# Patient Record
Sex: FEMALE | Race: WHITE | NOT HISPANIC OR LATINO | Employment: OTHER | URBAN - METROPOLITAN AREA
[De-identification: names, ages, dates, MRNs, and addresses within clinical notes are randomized per-mention and may not be internally consistent; named-entity substitution may affect disease eponyms.]

---

## 2017-04-18 ENCOUNTER — OFFICE VISIT (OUTPATIENT)
Dept: ORTHOPEDIC SURGERY | Facility: CLINIC | Age: 67
End: 2017-04-18

## 2017-04-18 VITALS — WEIGHT: 208 LBS | BODY MASS INDEX: 36.86 KG/M2 | HEIGHT: 63 IN | TEMPERATURE: 97.8 F

## 2017-04-18 DIAGNOSIS — M25.561 CHRONIC PAIN OF RIGHT KNEE: Primary | ICD-10-CM

## 2017-04-18 DIAGNOSIS — G89.29 CHRONIC PAIN OF RIGHT KNEE: Primary | ICD-10-CM

## 2017-04-18 DIAGNOSIS — M25.552 LEFT HIP PAIN: ICD-10-CM

## 2017-04-18 PROCEDURE — 73562 X-RAY EXAM OF KNEE 3: CPT | Performed by: NURSE PRACTITIONER

## 2017-04-18 PROCEDURE — 99203 OFFICE O/P NEW LOW 30 MIN: CPT | Performed by: NURSE PRACTITIONER

## 2017-04-18 PROCEDURE — 20610 DRAIN/INJ JOINT/BURSA W/O US: CPT | Performed by: NURSE PRACTITIONER

## 2017-04-18 PROCEDURE — 73502 X-RAY EXAM HIP UNI 2-3 VIEWS: CPT | Performed by: NURSE PRACTITIONER

## 2017-04-18 RX ORDER — LOSARTAN POTASSIUM 50 MG/1
50 TABLET ORAL DAILY
COMMUNITY
End: 2019-05-07 | Stop reason: SDUPTHER

## 2017-04-18 RX ORDER — LIDOCAINE HYDROCHLORIDE 10 MG/ML
4 INJECTION, SOLUTION INFILTRATION; PERINEURAL
Status: COMPLETED | OUTPATIENT
Start: 2017-04-18 | End: 2017-04-18

## 2017-04-18 RX ORDER — FLUTICASONE PROPIONATE 50 MCG
2 SPRAY, SUSPENSION (ML) NASAL AS NEEDED
COMMUNITY
End: 2017-09-07

## 2017-04-18 RX ORDER — BUPIVACAINE HYDROCHLORIDE 5 MG/ML
2 INJECTION, SOLUTION PERINEURAL
Status: COMPLETED | OUTPATIENT
Start: 2017-04-18 | End: 2017-04-18

## 2017-04-18 RX ORDER — METHYLPREDNISOLONE ACETATE 80 MG/ML
80 INJECTION, SUSPENSION INTRA-ARTICULAR; INTRALESIONAL; INTRAMUSCULAR; SOFT TISSUE
Status: COMPLETED | OUTPATIENT
Start: 2017-04-18 | End: 2017-04-18

## 2017-04-18 RX ORDER — HYDROCHLOROTHIAZIDE 25 MG/1
25 TABLET ORAL DAILY
Status: ON HOLD | COMMUNITY
End: 2018-10-06

## 2017-04-18 RX ORDER — LIDOCAINE HYDROCHLORIDE 10 MG/ML
2 INJECTION, SOLUTION INFILTRATION; PERINEURAL
Status: COMPLETED | OUTPATIENT
Start: 2017-04-18 | End: 2017-04-18

## 2017-04-18 RX ORDER — OMEPRAZOLE 20 MG/1
20 CAPSULE, DELAYED RELEASE ORAL EVERY MORNING
COMMUNITY
End: 2019-12-19 | Stop reason: DRUGHIGH

## 2017-04-18 RX ORDER — ACETAMINOPHEN 500 MG
500 TABLET ORAL AS NEEDED
COMMUNITY
End: 2018-10-06 | Stop reason: HOSPADM

## 2017-04-18 RX ADMIN — METHYLPREDNISOLONE ACETATE 80 MG: 80 INJECTION, SUSPENSION INTRA-ARTICULAR; INTRALESIONAL; INTRAMUSCULAR; SOFT TISSUE at 14:38

## 2017-04-18 RX ADMIN — LIDOCAINE HYDROCHLORIDE 2 ML: 10 INJECTION, SOLUTION INFILTRATION; PERINEURAL at 14:38

## 2017-04-18 RX ADMIN — BUPIVACAINE HYDROCHLORIDE 2 ML: 5 INJECTION, SOLUTION PERINEURAL at 14:38

## 2017-04-18 RX ADMIN — LIDOCAINE HYDROCHLORIDE 4 ML: 10 INJECTION, SOLUTION INFILTRATION; PERINEURAL at 14:38

## 2017-04-18 NOTE — PROGRESS NOTES
Patient: Chanell Tafoya  YOB: 1950 66 y.o. female  Medical Record Number: [id number]    Chief Complaints:   Chief Complaint   Patient presents with   • Left Hip - Pain   • Right Knee - Pain       History of Present Illness:Chanell Tafoya is a 66 y.o. female who presents With complaints of right knee and left hip pain.  Patient reports she has had ongoing issues with her right knee for years and had previous right knee arthroscopy, however he has definitely gotten worse over the last 6 months or so.  In addition she started with some left lateral hip pain a few months ago as well.  Denies any specific injury.  Saw her primary care physician and they referred her to outpatient physical therapy which has seemed to help.    Allergies:   Allergies   Allergen Reactions   • Amitriptyline Shortness Of Breath     Anaphylactic episode   • Asa [Aspirin] Other (See Comments)     Severe abdominal pain   • Clindamycin/Lincomycin Other (See Comments)     Blisters and erosions in esophagus & stomach   • Codeine Other (See Comments)     In large amounts causes headaches   • Dilaudid [Hydromorphone]      Causes chest pains/breathing problems when given by IV   • Gabapentin Diarrhea   • Ibuprofen Swelling     Swelling of hands and legs   • Naproxen      Blisters in mouth/stomach   • Other      Surgical tape causes blisters     • Talwin [Pentazocine] Itching       Medications:   Current Outpatient Prescriptions   Medication Sig Dispense Refill   • acetaminophen (TYLENOL) 500 MG tablet Take 500 mg by mouth As Needed for Mild Pain (1-3).     • fluticasone (FLONASE) 50 MCG/ACT nasal spray 2 sprays into each nostril As Needed for Rhinitis.     • hydrochlorothiazide (HYDRODIURIL) 25 MG tablet Take 25 mg by mouth Daily.     • losartan (COZAAR) 50 MG tablet Take 50 mg by mouth Daily.     • omeprazole (priLOSEC) 20 MG capsule Take 20 mg by mouth Every Morning.       No current facility-administered medications for this visit.   "        The following portions of the patient's history were reviewed and updated as appropriate: allergies, current medications, past family history, past medical history, past social history, past surgical history and problem list.    Review of Systems:   A 14 point review of systems was performed. All systems negative except pertinent positives/negative listed in HPI above    Physical Exam:   Vitals:    04/18/17 1404   Temp: 97.8 °F (36.6 °C)   Weight: 208 lb (94.3 kg)   Height: 63\" (160 cm)       General: A and O x 3, ASA, NAD    SCLERA:    Normal    DENTITION:   Normal  Skin clear no unusual lesions noted  Right knee trace amount of effusion 110° flexion +10° in extension with a positive Viki negative Lockman calf is soft nontender left hip patient is very tender over greater trochanteric bursa otherwise good range of motion with a negative central negative logroll neurologic intact    Radiology:  Xrays 3views (ap,lateral, sunrise) right knee were ordered and reviewed today secondary to pain and show bone-on-bone end-stage osteoarthritis with cyst and spur formation in addition to views left hip were ordered and reviewed today secondary to pain and were essentially normal.  No comparative views available    Assessment/Plan:  End-stage osteoarthritis right knee  Left hip greater trochanteric bursitis    We will proceed with right knee and left hip bursa injections.  Also I will write an additional order for outpatient physical therapy.  She is unable to take anti-inflammatories so she will continue with Tylenol as needed and will follow-up with Dr. Sloan in 6 weeks    Large Joint Arthrocentesis  Date/Time: 4/18/2017 2:38 PM  Consent given by: patient  Site marked: site marked  Timeout: Immediately prior to procedure a time out was called to verify the correct patient, procedure, equipment, support staff and site/side marked as required   Supporting Documentation  Indications: pain and joint swelling "   Procedure Details  Location: knee - R knee  Preparation: Patient was prepped and draped in the usual sterile fashion  Needle size: 18 G  Approach: anterolateral  Medications administered: 4 mL lidocaine 1 %; 80 mg methylPREDNISolone acetate 80 MG/ML; 2 mL bupivacaine      Large Joint Arthrocentesis  Date/Time: 4/18/2017 2:38 PM  Consent given by: patient  Site marked: site marked  Timeout: Immediately prior to procedure a time out was called to verify the correct patient, procedure, equipment, support staff and site/side marked as required   Supporting Documentation  Indications: pain   Procedure Details  Location: hip - L greater trochanteric bursa  Needle size: 18 G  Approach: lateral  Medications administered: 2 mL lidocaine 1 %; 80 mg methylPREDNISolone acetate 80 MG/ML; 2 mL bupivacaine

## 2017-06-01 ENCOUNTER — OFFICE VISIT (OUTPATIENT)
Dept: ORTHOPEDIC SURGERY | Facility: CLINIC | Age: 67
End: 2017-06-01

## 2017-06-01 VITALS — BODY MASS INDEX: 36.46 KG/M2 | WEIGHT: 205.8 LBS | TEMPERATURE: 98.3 F | HEIGHT: 63 IN

## 2017-06-01 DIAGNOSIS — M17.11 PRIMARY OSTEOARTHRITIS OF RIGHT KNEE: Primary | ICD-10-CM

## 2017-06-01 PROCEDURE — 99214 OFFICE O/P EST MOD 30 MIN: CPT | Performed by: ORTHOPAEDIC SURGERY

## 2017-06-01 RX ORDER — MELATONIN
1000 DAILY
COMMUNITY

## 2017-06-01 NOTE — PROGRESS NOTES
Patient: Chanell Tafoya  YOB: 1950 66 y.o. female  Medical Record Number: [id number]    Chief Complaints:   Chief Complaint   Patient presents with   • Right Knee - Follow-up, Pain   • Left Hip - Follow-up, Pain       History of Present Illness:Chanell Tafoya is a 66 y.o. female who presents for follow-up of  Of her right knee.  She has severe valgus end-stage osteoarthritis.  Injections have been relatively short-lived.  She's having considerable mechanical symptoms with a knee shifting and popping and giving out on her.  She has trouble arising from a seated position.  She has trouble walking even short distances especially such as when getting up to go to the restroom.  She can only walk very short distances the pain as a stabbing aching pain about the lateral joint.  She still does physical therapy and feels that her legs are stronger but the pain and instability are still persistent    Allergies:   Allergies   Allergen Reactions   • Amitriptyline Shortness Of Breath     Anaphylactic episode   • Asa [Aspirin] Other (See Comments)     Severe abdominal pain   • Clindamycin/Lincomycin Other (See Comments)     Blisters and erosions in esophagus & stomach   • Codeine Other (See Comments)     In large amounts causes headaches   • Dilaudid [Hydromorphone]      Causes chest pains/breathing problems when given by IV   • Gabapentin Diarrhea   • Ibuprofen Swelling     Swelling of hands and legs   • Naproxen      Blisters in mouth/stomach   • Other      Surgical tape causes blisters     • Talwin [Pentazocine] Itching       Medications:   Current Outpatient Prescriptions   Medication Sig Dispense Refill   • acetaminophen (TYLENOL) 500 MG tablet Take 500 mg by mouth As Needed for Mild Pain (1-3).     • cholecalciferol (VITAMIN D3) 1000 UNITS tablet Take 1,000 Units by mouth Daily.     • fluticasone (FLONASE) 50 MCG/ACT nasal spray 2 sprays into each nostril As Needed for Rhinitis.     • hydrochlorothiazide  "(HYDRODIURIL) 25 MG tablet Take 25 mg by mouth Daily.     • losartan (COZAAR) 50 MG tablet Take 50 mg by mouth Daily.     • omeprazole (priLOSEC) 20 MG capsule Take 20 mg by mouth Every Morning.       No current facility-administered medications for this visit.          The following portions of the patient's history were reviewed and updated as appropriate: allergies, current medications, past family history, past medical history, past social history, past surgical history and problem list.    Review of Systems:   A 14 point review of systems was performed. All systems negative except pertinent positives/negative listed in HPI above    Physical Exam:   Vitals:    06/01/17 0916   Temp: 98.3 °F (36.8 °C)   Weight: 205 lb 12.8 oz (93.4 kg)   Height: 63\" (160 cm)       General: A and O x 3, ASA, NAD    SCLERA:    Normal    DENTITION:   Normal  Knee:  right    ALIGNMENT:     Valgus      GAIT:    Antalgic    SKIN:    No abnormality    RANGE OF MOTION:   7  -  113   DEG    STRENGTH:   4  / 5    LIGAMENTS:    No varus / valgus instability.   Negative  Lachman.    MENISCUS:     Negative   Viki       DISTAL PULSES:    Paplable    DISTAL SENSATION :   Intact    LYMPHATICS:     No   lymphadenopathy    OTHER:          - Positive   effusion      - Crepitance with ROM     Radiology:  Xrays 3views right knee (ap,lateral, sunrise) taken previously demonstratingadvanced valgus osteoarthritis with bone on bone articulation, subchondral cysts, and periarticular osteophytes      Assessment/Plan:  Right knee advanced valgus osteoarthritis.  She has undergone the full complement of conservative measures and has limitations of basic activities of daily living.  She is going to continue her therapy exercises and work on weight maintenance.  She is going to see a cardiologist for evaluation.  When she is ready to proceed she will call back and we will schedule her right total knee replacement.  I plan on her staying overnight she will " go home the next day and we would utilize outpatient therapy.  She will call when she is ready to proceed

## 2017-07-21 ENCOUNTER — TELEPHONE (OUTPATIENT)
Dept: ORTHOPEDIC SURGERY | Facility: CLINIC | Age: 67
End: 2017-07-21

## 2017-08-08 ENCOUNTER — TELEPHONE (OUTPATIENT)
Dept: ORTHOPEDIC SURGERY | Facility: CLINIC | Age: 67
End: 2017-08-08

## 2017-09-07 ENCOUNTER — OFFICE VISIT (OUTPATIENT)
Dept: ORTHOPEDIC SURGERY | Facility: CLINIC | Age: 67
End: 2017-09-07

## 2017-09-07 VITALS — BODY MASS INDEX: 36.14 KG/M2 | HEIGHT: 63 IN | WEIGHT: 204 LBS | TEMPERATURE: 97.4 F

## 2017-09-07 DIAGNOSIS — M17.11 PRIMARY OSTEOARTHRITIS OF RIGHT KNEE: Primary | ICD-10-CM

## 2017-09-07 PROCEDURE — 99213 OFFICE O/P EST LOW 20 MIN: CPT | Performed by: ORTHOPAEDIC SURGERY

## 2017-09-07 NOTE — PROGRESS NOTES
Patient: Chanell Tafoya  YOB: 1950 67 y.o. female  Medical Record Number: [id number]    Chief Complaints:   Chief Complaint   Patient presents with   • Right Knee - Follow-up, Pain       History of Present Illness:Chanell Tafoya is a 67 y.o. female who presents for follow-up of  Right knee pain - severe and intermittent stabbing pain laterl joint worse with weightbearing    Allergies:   Allergies   Allergen Reactions   • Amitriptyline Shortness Of Breath     Anaphylactic episode   • Asa [Aspirin] Other (See Comments)     Severe abdominal pain   • Clindamycin/Lincomycin Other (See Comments)     Blisters and erosions in esophagus & stomach   • Codeine Other (See Comments)     In large amounts causes headaches   • Dilaudid [Hydromorphone]      Causes chest pains/breathing problems when given by IV   • Gabapentin Diarrhea   • Ibuprofen Swelling     Swelling of hands and legs   • Naproxen      Blisters in mouth/stomach   • Other      Surgical tape causes blisters     • Talwin [Pentazocine] Itching       Medications:   Current Outpatient Prescriptions   Medication Sig Dispense Refill   • acetaminophen (TYLENOL) 500 MG tablet Take 500 mg by mouth As Needed for Mild Pain (1-3).     • cholecalciferol (VITAMIN D3) 1000 UNITS tablet Take 1,000 Units by mouth Daily.     • hydrochlorothiazide (HYDRODIURIL) 25 MG tablet Take 25 mg by mouth Daily.     • losartan (COZAAR) 50 MG tablet Take 50 mg by mouth Daily.     • omeprazole (priLOSEC) 20 MG capsule Take 20 mg by mouth Every Morning.       No current facility-administered medications for this visit.          The following portions of the patient's history were reviewed and updated as appropriate: allergies, current medications, past family history, past medical history, past social history, past surgical history and problem list.    Review of Systems:   A 14 point review of systems was performed. All systems negative except pertinent positives/negative listed in  "HPI above    Physical Exam:   Vitals:    09/07/17 0809   Temp: 97.4 °F (36.3 °C)   Weight: 204 lb (92.5 kg)   Height: 63\" (160 cm)       General: A and O x 3, ASA, NAD    SCLERA:    Normal    DENTITION:   Normal  Knee:  right    ALIGNMENT:     Valgus      GAIT:    Antalgic    SKIN:    No abnormality    RANGE OF MOTION:   5  -  120   DEG    STRENGTH:   4  / 5    LIGAMENTS:    No varus / valgus instability.   Negative  Lachman.    MENISCUS:     Negative   Viki       DISTAL PULSES:    Paplable    DISTAL SENSATION :   Intact    LYMPHATICS:     No   lymphadenopathy    OTHER:          - Positive   effusion      - Crepitance with ROM     Radiology:  Xrays 3views right knee (ap,lateral, sunrise) taken previously demonstratingadvanced valgus osteoarthritis with bone on bone articulation, subchondral cysts, and periarticular osteophytes      Assessment/Plan:  Right knee advanced valgus end-stage osteoarthritis.  She presented today we had a discussion about the surgery and I answered all the questions that she had related to the surgery.  She is going to see her pulmonologist to obtain clearance.  After she has obtained clearance we'll call and get her scheduled.  I explained that we would like to proceed after she has her upper GI endoscopy and I would want at least 2 weeks from the time that she has the endoscopy until we schedule surgery.  Given her history of allergy to aspirin we would need to use Coumadin for DVT prophylaxis postop.  Should plan on being discharged home and then transitioned outpatient when she is able.  "

## 2018-01-25 ENCOUNTER — OFFICE VISIT (OUTPATIENT)
Dept: ORTHOPEDIC SURGERY | Facility: CLINIC | Age: 68
End: 2018-01-25

## 2018-01-25 VITALS — WEIGHT: 200 LBS | BODY MASS INDEX: 35.44 KG/M2 | TEMPERATURE: 98.2 F | HEIGHT: 63 IN

## 2018-01-25 DIAGNOSIS — M17.11 PRIMARY OSTEOARTHRITIS OF RIGHT KNEE: Primary | ICD-10-CM

## 2018-01-25 PROCEDURE — 99213 OFFICE O/P EST LOW 20 MIN: CPT | Performed by: ORTHOPAEDIC SURGERY

## 2018-01-25 NOTE — PROGRESS NOTES
Patient: Chanell Tafoya  YOB: 1950 67 y.o. female  Medical Record Number: [id number]    Chief Complaints:   Chief Complaint   Patient presents with   • Right Knee - Follow-up, Pain       History of Present Illness:Chanell Tafoya is a 67 y.o. female who presents for follow-up of   Right knee pain which is severe and constant.  She has advanced end-stage osteoarthritis.  She is recently recovered from the flu.  She had some cardiac issues due to the flu.  There has been workup for this and apparently she has some bradycardia but no EKG abnormalities.  She is complaining of some genitourinary issues and is set to see Dr. Gila Sloan.  She would like to address those before considering knee surgery which I think is reasonable.    Allergies:  Allergies   Allergen Reactions   • Amitriptyline Shortness Of Breath     Anaphylactic episode   • Asa [Aspirin] Other (See Comments)     Severe abdominal pain   • Clindamycin/Lincomycin Other (See Comments)     Blisters and erosions in esophagus & stomach   • Codeine Other (See Comments)     In large amounts causes headaches   • Dilaudid [Hydromorphone]      Causes chest pains/breathing problems when given by IV   • Gabapentin Diarrhea   • Ibuprofen Swelling     Swelling of hands and legs   • Naproxen      Blisters in mouth/stomach   • Other      Surgical tape causes blisters     • Talwin [Pentazocine] Itching       Medications:   Current Outpatient Prescriptions   Medication Sig Dispense Refill   • acetaminophen (TYLENOL) 500 MG tablet Take 500 mg by mouth As Needed for Mild Pain (1-3).     • cholecalciferol (VITAMIN D3) 1000 UNITS tablet Take 1,000 Units by mouth Daily.     • hydrochlorothiazide (HYDRODIURIL) 25 MG tablet Take 25 mg by mouth Daily.     • losartan (COZAAR) 50 MG tablet Take 50 mg by mouth Daily.     • omeprazole (priLOSEC) 20 MG capsule Take 20 mg by mouth Every Morning.       No current facility-administered medications for this visit.   "        The following portions of the patient's history were reviewed and updated as appropriate: allergies, current medications, past family history, past medical history, past social history, past surgical history and problem list.    Review of Systems:   A 14 point review of systems was performed. All systems negative except pertinent positives/negative listed in HPI above    Physical Exam:   Vitals:    01/25/18 0810   Temp: 98.2 °F (36.8 °C)   Weight: 90.7 kg (200 lb)   Height: 160 cm (63\")       General: A and O x 3, ASA, NAD    SCLERA:    Normal    DENTITION:   Normal  Knee:  right    ALIGNMENT:     Valgus      GAIT:    Antalgic    SKIN:    No abnormality    RANGE OF MOTION:   5  -  110   DEG    STRENGTH:   4  / 5    LIGAMENTS:    No varus / valgus instability.   Negative  Lachman.    MENISCUS:     Negative   Viki       DISTAL PULSES:    Paplable    DISTAL SENSATION :   Intactknee OA    LYMPHATICS:     No   lymphadenopathy    OTHER:          - Positive   effusion      - Crepitance with ROM     Radiology:  Xrays 3views right knee  (ap,lateral, sunrise) taken previously demonstratingadvanced valgus osteoarthritis with bone on bone articulation, subchondral cysts, and periarticular osteophytes      Assessment/Plan:  Advanced right osteoarthritis.  She's having some other issues and has some bladder urinary and low back issues as well.  I'm going to send her for 1 visit to work on her abdominal core to hopefully help out with back.  We will need to proceed with knee replacement but likely in the future and in my opinion it may be best for her to address her bladder issues before going forward with knee replacement and subsequent rehabilitation.  "

## 2018-07-06 ENCOUNTER — OFFICE VISIT (OUTPATIENT)
Dept: ORTHOPEDIC SURGERY | Facility: CLINIC | Age: 68
End: 2018-07-06

## 2018-07-06 VITALS — WEIGHT: 204 LBS | BODY MASS INDEX: 36.14 KG/M2 | HEIGHT: 63 IN | TEMPERATURE: 97.1 F

## 2018-07-06 DIAGNOSIS — M54.42 ACUTE LEFT-SIDED LOW BACK PAIN WITH LEFT-SIDED SCIATICA: Primary | ICD-10-CM

## 2018-07-06 PROCEDURE — 99214 OFFICE O/P EST MOD 30 MIN: CPT | Performed by: NURSE PRACTITIONER

## 2018-07-06 RX ORDER — METOPROLOL SUCCINATE 25 MG/1
TABLET, EXTENDED RELEASE ORAL
COMMUNITY
End: 2018-09-20

## 2018-07-06 NOTE — PROGRESS NOTES
Patient: Chanell Tafoya  YOB: 1950 67 y.o. female  Medical Record Number: [id number]    Chief Complaints:   Chief Complaint   Patient presents with   • Left Knee - Pain, Establish Care       History of Present Illness:Chanell Tafoya is a 67 y.o. female who presents With a new complaint of pain in her lower back going down her entire left leg.  Patient reports she started with some left knee pain about 3 weeks ago and saw the nurse practitioner at Dr. Benjamin office.  At that time she was given a left knee cortisone injection which did not help.  She was also having pain in her lower back going down her entire left leg.  She also has numbness and tingling in her left foot.  She has a history of arthritis in the lower back but his never had an MRI.  She denies any injury.  She describes the pain in her lower back as a severe grinding type pain worse with standing sitting walking only slightly better with Tylenol.    Allergies:   Allergies   Allergen Reactions   • Amitriptyline Shortness Of Breath     Anaphylactic episode   • Asa [Aspirin] Other (See Comments)     Severe abdominal pain   • Azithromycin Other (See Comments)   • Clindamycin/Lincomycin Other (See Comments)     Blisters and erosions in esophagus & stomach   • Codeine Other (See Comments)     In large amounts causes headaches   • Dilaudid [Hydromorphone]      Causes chest pains/breathing problems when given by IV   • Fluconazole Other (See Comments)   • Gabapentin Diarrhea   • Ibuprofen Swelling     Swelling of hands and legs   • Lubiprostone Other (See Comments)   • Naproxen      Blisters in mouth/stomach   • Other      Surgical tape causes blisters     • Talwin [Pentazocine] Itching       Medications:   Current Outpatient Prescriptions   Medication Sig Dispense Refill   • Acetaminophen (TYLENOL EXTRA STRENGTH PO) Tylenol Extra Strength   PRN     • acetaminophen (TYLENOL) 500 MG tablet Take 500 mg by mouth As Needed for Mild Pain (1-3).    "  • cholecalciferol (VITAMIN D3) 1000 UNITS tablet Take 1,000 Units by mouth Daily.     • hydrochlorothiazide (HYDRODIURIL) 25 MG tablet Take 25 mg by mouth Daily.     • losartan (COZAAR) 50 MG tablet Take 50 mg by mouth Daily.     • metoprolol succinate XL (TOPROL-XL) 25 MG 24 hr tablet metoprolol succinate ER 25 mg tablet,extended release 24 hr   Take 1 tablet every day by oral route for 30 days.     • omeprazole (priLOSEC) 20 MG capsule Take 20 mg by mouth Every Morning.       No current facility-administered medications for this visit.          The following portions of the patient's history were reviewed and updated as appropriate: allergies, current medications, past family history, past medical history, past social history, past surgical history and problem list.    Review of Systems:   A 14 point review of systems was performed. All systems negative except pertinent positives/negative listed in HPI above    Physical Exam:   Vitals:    07/06/18 1534   Temp: 97.1 °F (36.2 °C)   Weight: 92.5 kg (204 lb)   Height: 160 cm (63\")       General: A and O x 3, ASA, NAD    SCLERA:    Normal    DENTITION:   Normal  Skin clear no unusual lesions noted  Low back patient has decreased range of motion secondary to pain with a positive left straight leg raise neurologic is intact left knee no appreciable fusion 115° flexion neutron extension with a positive Viki negative Lockman calf is soft and nontender    Radiology:  Xrays previous x-rays of the left knee were reviewed and show moderate arthritic changes.  She does have bone on bone in end-stage osteoarthritis right knee.      Assessment/Plan:  Low back pain with radiculopathy    We will proceed with an MRI of the lumbar spine.  Patient will call couple days after regarding results and treatment options.  "

## 2018-07-23 ENCOUNTER — HOSPITAL ENCOUNTER (OUTPATIENT)
Dept: MRI IMAGING | Facility: HOSPITAL | Age: 68
Discharge: HOME | End: 2018-07-23
Admitting: NURSE PRACTITIONER

## 2018-07-23 VITALS
OXYGEN SATURATION: 93 % | HEART RATE: 70 BPM | DIASTOLIC BLOOD PRESSURE: 73 MMHG | SYSTOLIC BLOOD PRESSURE: 117 MMHG | RESPIRATION RATE: 16 BRPM

## 2018-07-23 DIAGNOSIS — M54.42 ACUTE LEFT-SIDED LOW BACK PAIN WITH LEFT-SIDED SCIATICA: ICD-10-CM

## 2018-07-23 PROCEDURE — 72148 MRI LUMBAR SPINE W/O DYE: CPT

## 2018-07-23 NOTE — NURSING NOTE
Patient here for a MRI of lumbar spine and has a [employer] pacemaker. Awaiting for the [employer] representative to place pacemaker in safe mode.

## 2018-07-23 NOTE — NURSING NOTE
Returned to triage post MR. No complaints voiced, no distress. Kiel from [employer] here to put her pacer back into pre mri mode.Completed. Home with . No distress.

## 2018-07-23 NOTE — NURSING NOTE
Kiel from [employer] here and placed patient's pacemake in safe mode for MRI. MRI aware patient ready. Stated room will be open in 10 minutes.

## 2018-07-25 ENCOUNTER — TELEPHONE (OUTPATIENT)
Dept: ORTHOPEDIC SURGERY | Facility: CLINIC | Age: 68
End: 2018-07-25

## 2018-07-25 NOTE — TELEPHONE ENCOUNTER
----- Message from LIA Tineo sent at 7/24/2018  1:22 PM EDT -----  MRI does show some arthritic changes.  Would recommend physical therapy and scheduled anti-inflammatories if still having pain

## 2018-07-25 NOTE — TELEPHONE ENCOUNTER
Patient is not able to take any anti-inflammatories. She is currently working at getting in to get the knee replaced.  She is going to get an appt with [facility] for further evaluation. I transferred the call to Irene to get her scheduled.

## 2018-08-16 ENCOUNTER — OFFICE VISIT (OUTPATIENT)
Dept: ORTHOPEDIC SURGERY | Facility: CLINIC | Age: 68
End: 2018-08-16

## 2018-08-16 VITALS — BODY MASS INDEX: 36.43 KG/M2 | WEIGHT: 205.6 LBS | TEMPERATURE: 97.7 F | HEIGHT: 63 IN

## 2018-08-16 DIAGNOSIS — M25.562 ACUTE PAIN OF LEFT KNEE: ICD-10-CM

## 2018-08-16 DIAGNOSIS — M17.11 PRIMARY OSTEOARTHRITIS OF RIGHT KNEE: Primary | ICD-10-CM

## 2018-08-16 PROCEDURE — 99214 OFFICE O/P EST MOD 30 MIN: CPT | Performed by: ORTHOPAEDIC SURGERY

## 2018-08-16 PROCEDURE — 73562 X-RAY EXAM OF KNEE 3: CPT | Performed by: ORTHOPAEDIC SURGERY

## 2018-08-16 PROCEDURE — 73560 X-RAY EXAM OF KNEE 1 OR 2: CPT | Performed by: ORTHOPAEDIC SURGERY

## 2018-08-16 RX ORDER — CEFAZOLIN SODIUM 2 G/100ML
2 INJECTION, SOLUTION INTRAVENOUS ONCE
Status: CANCELLED | OUTPATIENT
Start: 2018-10-01 | End: 2018-10-01

## 2018-08-16 RX ORDER — MELOXICAM 15 MG/1
15 TABLET ORAL ONCE
Status: CANCELLED | OUTPATIENT
Start: 2018-10-01 | End: 2018-10-01

## 2018-08-16 NOTE — PROGRESS NOTES
"Patient: Chanell Tafoya  YOB: 1950 68 y.o. female  Medical Record Number: [id number]    Chief Complaints:   Chief Complaint   Patient presents with   • Right Knee - Follow-up, Pain   • Discuss Surgery       History of Present Illness:Chanell Tafoya is a 68 y.o. female who presents for follow-up of  Right lateral knee pain - stabbing severe worse with WB. Limits ADL\"S. Innjections minimal relief.  Has progressed over the last 6 months    Allergies:   Allergies   Allergen Reactions   • Amitriptyline Shortness Of Breath     Anaphylactic episode   • Asa [Aspirin] Other (See Comments)     Severe abdominal pain   • Azithromycin Other (See Comments)   • Clindamycin/Lincomycin Other (See Comments)     Blisters and erosions in esophagus & stomach   • Codeine Other (See Comments)     In large amounts causes headaches   • Dilaudid [Hydromorphone]      Causes chest pains/breathing problems when given by IV   • Fluconazole Other (See Comments)   • Gabapentin Diarrhea   • Ibuprofen Swelling     Swelling of hands and legs   • Lubiprostone Other (See Comments)   • Naproxen      Blisters in mouth/stomach   • Other      Surgical tape causes blisters     • Talwin [Pentazocine] Itching       Medications:   Current Outpatient Prescriptions   Medication Sig Dispense Refill   • acetaminophen (TYLENOL) 500 MG tablet Take 500 mg by mouth As Needed for Mild Pain (1-3).     • cholecalciferol (VITAMIN D3) 1000 UNITS tablet Take 1,000 Units by mouth Daily.     • hydrochlorothiazide (HYDRODIURIL) 25 MG tablet Take 25 mg by mouth Daily.     • losartan (COZAAR) 50 MG tablet Take 50 mg by mouth Daily.     • metoprolol succinate XL (TOPROL-XL) 25 MG 24 hr tablet metoprolol succinate ER 25 mg tablet,extended release 24 hr   Take 1 tablet every day by oral route for 30 days.-     • omeprazole (priLOSEC) 20 MG capsule Take 20 mg by mouth Every Morning.     • Acetaminophen (TYLENOL EXTRA STRENGTH PO) Tylenol Extra Strength   PRN   " "    No current facility-administered medications for this visit.          The following portions of the patient's history were reviewed and updated as appropriate: allergies, current medications, past family history, past medical history, past social history, past surgical history and problem list.    Review of Systems:   A 14 point review of systems was performed. All systems negative except pertinent positives/negative listed in HPI above    Physical Exam:   Vitals:    08/16/18 1101   Temp: 97.7 °F (36.5 °C)   TempSrc: Temporal Artery    Weight: 93.3 kg (205 lb 9.6 oz)   Height: 160 cm (63\")       General: A and O x 3, ASA, NAD    SCLERA:    Normal    DENTITION:   Normal  Knee:  right    ALIGNMENT:     Valgus      GAIT:    Antalgic    SKIN:    No abnormality    RANGE OF MOTION:   5  -  115   DEG    STRENGTH:   4  / 5    LIGAMENTS:    No varus / valgus instability.   Negative  Lachman.    MENISCUS:     Negative   Viki       DISTAL PULSES:    Paplable    DISTAL SENSATION :   Intact    LYMPHATICS:     No   lymphadenopathy    OTHER:          - Positive   effusion      - Crepitance with ROM     Radiology:  Xrays 3views right knee  (ap,lateral, sunrise) were ordered and reviewed for evaluation of knee pain demonstratingadvanced valgus osteoarthritis with bone on bone articulation, subchondral cysts, and periarticular osteophytes  todays xrays were compared to previous xrays and demonstrate no change    Assessment/Plan:  Right knee advanced end stage OA. Failed conservative measures.   Continuation of conservative management vs. TKA discussed.  The patient wishes to proceed with total knee replacement.  At this point the patient has failed the full compliment of conservative treatment and stating complete understanding of the risks/benefits/ anternatives wishes to proceed with surgical treatment.    Risk and benefits of surgery were reviewed.  Including, but not limited to, blood clots or pulmonary embolism, " anesthesia risk, infection, fracture, skin/leg numbness, persistent pain/crepitance/popping/catching, failure of the implant, need for future surgeries, hematoma, possible nerve or blood vessel injury, need for transfusion, and potential risk of stroke,heart attack or death, among others.  The patient understands and wishes to proceed.     It was explained that if tissue has been repaired or reconstructed, there is also an increased chance of failure which may require further management.  Following the completion of the discussion, the patient expressed understanding of this planned course of care, all their questions were answered and consent will be obtained preoperatively.    Operative Plan: right knee [facility] Total Knee Replacement an overnight staywith home health rehab.  Has a history of abdominal discomfort with aspirin which is severe.  I recommended that she try for a few days to take baby aspirin twice a day with food and if she tolerates that that would be our medication of choice post operatively.

## 2018-08-21 PROBLEM — M17.11 PRIMARY OSTEOARTHRITIS OF RIGHT KNEE: Status: ACTIVE | Noted: 2018-08-21

## 2018-08-28 ENCOUNTER — TREATMENT (OUTPATIENT)
Dept: PHYSICAL THERAPY | Facility: CLINIC | Age: 68
End: 2018-08-28

## 2018-08-28 DIAGNOSIS — G89.29 CHRONIC PAIN OF RIGHT KNEE: Primary | ICD-10-CM

## 2018-08-28 DIAGNOSIS — M25.561 CHRONIC PAIN OF RIGHT KNEE: Primary | ICD-10-CM

## 2018-08-28 PROCEDURE — 97530 THERAPEUTIC ACTIVITIES: CPT | Performed by: PHYSICAL THERAPIST

## 2018-08-28 PROCEDURE — G8978 MOBILITY CURRENT STATUS: HCPCS | Performed by: PHYSICAL THERAPIST

## 2018-08-28 PROCEDURE — 97161 PT EVAL LOW COMPLEX 20 MIN: CPT | Performed by: PHYSICAL THERAPIST

## 2018-08-28 PROCEDURE — G8979 MOBILITY GOAL STATUS: HCPCS | Performed by: PHYSICAL THERAPIST

## 2018-08-28 PROCEDURE — 97110 THERAPEUTIC EXERCISES: CPT | Performed by: PHYSICAL THERAPIST

## 2018-08-28 NOTE — PROGRESS NOTES
"Physical Therapy Initial Evaluation and Plan of Care        Subjective Evaluation    History of Present Illness  Onset date: .  Mechanism of injury: Hx of R knee scope for meniscus tear ~ 20 yrs ago; OA and pain worsening over time; told \"bone on bone\"; 18 received pacemaker; plan to have TKA 10/1/18; know left leg hurting      Patient Occupation: retired from professional housecleaning   Precautions and Work Restrictions: nonePain  Current pain ratin  At best pain ratin  At worst pain ratin  Location: across front of knee and ocassionally behind knee  Quality: dull ache, burning and throbbing  Relieving factors: ice, rest and medications  Aggravating factors: stairs, squatting, repetitive movement, ambulation and sleeping (heat)  Progression: worsening    Social Support  Lives in: one-story house  Lives with: spouse    Diagnostic Tests  X-ray: abnormal (advanced end-stage OA)    Treatments  Previous treatment: medication, injection treatment and physical therapy  Current treatment: medication  Patient Goals  Patient goals for therapy: decreased pain  Patient goal: prepare for TKA           Objective       Observations     Additional Observation Details  Genu valgus    Tenderness     Right Knee   Tenderness in the pes anserinus.     Active Range of Motion   Left Knee   Flexion: 102 degrees   Extensor la degrees     Right Knee   Flexion: 106 degrees   Extensor la degrees     Patellar Mobility   Left Knee Patellar tendons within functional limits include the medial. Hypomobile in the left lateral, left superior and left inferior patellar tendon(s).     Right Knee Patellar tendons within functional limits include the medial. Hypomobile in the lateral, superior and inferior patellar tendon(s).     Patellar Static Positioning   Left Knee: medial tilt  Right Knee: medial tilt    Strength/Myotome Testing     Left Hip   Planes of Motion   Flexion: 4-  Abduction: 4  Adduction: 4  External " rotation: 3-  Internal rotation: 4-    Right Hip   Planes of Motion   Flexion: 4-  Abduction: 4  Adduction: 4-  External rotation: 3+  Internal rotation: 4-    Left Knee   Flexion: 4-  Extension: 4    Right Knee   Flexion: 4  Extension: 4    Tests     Additional Tests Details  + crepitus R; tight HS R>L    Ambulation     Observational Gait   Gait: antalgic   Decreased walking speed and stride length.   Left foot contact pattern: foot flat  Right foot contact pattern: forefoot         Assessment & Plan     Assessment  Impairments: abnormal gait, abnormal or restricted ROM, activity intolerance, impaired physical strength and pain with function  Assessment details: 67 yo F with chronic pain and Dx of end-stage OA presents with moderate weakness julián LEs, limited and painful ROM, antalgic gait and limited gertrude for WB ADLs.  Needs PT to educate on proper HEP to improve mobility and strength and prepare for successful TKA  Prognosis: fair  Prognosis details: Co-morbidities and advance OA  Functional Limitations: sleeping, walking, uncomfortable because of pain, standing, stooping and unable to perform repetitive tasks  Goals  Plan Goals: STGs x 1 wk  1. Increasing ROM and strength for improved ADLs  2. Pain with ex and basic ADLs < 7/10  3. Review body mechanics and joint protection principles with ADLs  4. Ambulates level surface and 2 inch steps with min to no antalgia    LTGs x 4 wks  1. ROM and strength improved for preparation for TKA  2. Pain with ex and ADLs < 5/10  3. Ambulates 500 ft and 4 inch steps with min antalgia for improved community ambulation  4. Independent with HEP and joint protection principles    Plan  Therapy options: will be seen for skilled physical therapy services  Planned modality interventions: cryotherapy  Planned therapy interventions: home exercise program, gait training, therapeutic activities, stretching, strengthening and body mechanics training  Frequency: 1x week  Duration in weeks:  4  Treatment plan discussed with: patient        Manual Therapy:    0     mins  98828;  Therapeutic Exercise:    18     mins  77705;     Neuromuscular Reinier:    0    mins  74567;    Therapeutic Activity:     8     mins  77517;       Timed Treatment:   26   mins   Total Treatment:     58   mins    PT SIGNATURE: Lolita Marshall, SHANNON   DATE TREATMENT INITIATED: 8/28/2018    Medicare Initial Certification  Certification Period: 11/26/2018  I certify that the therapy services are furnished while this patient is under my care.  The services outlined above are required by this patient, and will be reviewed every 90 days.     PHYSICIAN: Mathew Sloan MD      DATE:     Please sign and return via fax to [phone].. Thank you, [facility].

## 2018-08-28 NOTE — PATIENT INSTRUCTIONS
Educated about Dx and exam findings, rationale and POC. Gave handout on HEP with instructions.  Basic joint protection

## 2018-09-18 ENCOUNTER — OFFICE VISIT (OUTPATIENT)
Dept: ORTHOPEDIC SURGERY | Facility: CLINIC | Age: 68
End: 2018-09-18

## 2018-09-18 VITALS — HEIGHT: 63 IN | TEMPERATURE: 97.7 F | BODY MASS INDEX: 35.61 KG/M2 | WEIGHT: 201 LBS

## 2018-09-18 DIAGNOSIS — M25.561 CHRONIC PAIN OF BOTH KNEES: Primary | ICD-10-CM

## 2018-09-18 DIAGNOSIS — G89.29 CHRONIC PAIN OF BOTH KNEES: Primary | ICD-10-CM

## 2018-09-18 DIAGNOSIS — M25.562 CHRONIC PAIN OF BOTH KNEES: Primary | ICD-10-CM

## 2018-09-18 PROCEDURE — 73562 X-RAY EXAM OF KNEE 3: CPT | Performed by: ORTHOPAEDIC SURGERY

## 2018-09-18 PROCEDURE — 99213 OFFICE O/P EST LOW 20 MIN: CPT | Performed by: ORTHOPAEDIC SURGERY

## 2018-09-18 NOTE — PROGRESS NOTES
Patient: Chanell Tafoya  YOB: 1950 68 y.o. female  Medical Record Number: [id number]    Chief Complaints:   Chief Complaint   Patient presents with   • Left Knee - Pain, Follow-up       History of Present Illness:Chanell Tafoya is a 68 y.o. female who presents for follow-up of  Left knee pain.  She recently had a fall onto both knees and is having quite a bit of pain in the left knee and feelings of instability.  She is using a cane.  She is scheduled to have right knee replaced the beginning of October and wanted to check her left knee out today.  She describes an anterior aching pain worse with weightbearing or activity.    Allergies:   Allergies   Allergen Reactions   • Amitriptyline Shortness Of Breath     Anaphylactic episode   • Asa [Aspirin] Other (See Comments)     Severe abdominal pain   • Azithromycin Other (See Comments)   • Clindamycin/Lincomycin Other (See Comments)     Blisters and erosions in esophagus & stomach   • Codeine Other (See Comments)     In large amounts causes headaches   • Dilaudid [Hydromorphone]      Causes chest pains/breathing problems when given by IV   • Fluconazole Other (See Comments)   • Gabapentin Diarrhea   • Ibuprofen Swelling     Swelling of hands and legs   • Lubiprostone Other (See Comments)   • Naproxen      Blisters in mouth/stomach   • Other      Surgical tape causes blisters     • Talwin [Pentazocine] Itching       Medications:   Current Outpatient Prescriptions   Medication Sig Dispense Refill   • Acetaminophen (TYLENOL EXTRA STRENGTH PO) Tylenol Extra Strength   PRN     • acetaminophen (TYLENOL) 500 MG tablet Take 500 mg by mouth As Needed for Mild Pain (1-3).     • cholecalciferol (VITAMIN D3) 1000 UNITS tablet Take 1,000 Units by mouth Daily.     • hydrochlorothiazide (HYDRODIURIL) 25 MG tablet Take 25 mg by mouth Daily.     • losartan (COZAAR) 50 MG tablet Take 50 mg by mouth Daily.     • metoprolol succinate XL (TOPROL-XL) 25 MG 24 hr tablet  "metoprolol succinate ER 25 mg tablet,extended release 24 hr   Take 1 tablet every day by oral route for 30 days.-     • omeprazole (priLOSEC) 20 MG capsule Take 20 mg by mouth Every Morning.       No current facility-administered medications for this visit.          The following portions of the patient's history were reviewed and updated as appropriate: allergies, current medications, past family history, past medical history, past social history, past surgical history and problem list.    Review of Systems:   A 14 point review of systems was performed. All systems negative except pertinent positives/negative listed in HPI above    Physical Exam:   Vitals:    09/18/18 0845   Temp: 97.7 °F (36.5 °C)   TempSrc: Temporal Artery    Weight: 91.2 kg (201 lb)   Height: 160 cm (63\")   PainSc: 4  Comment: Fell 09/14/18   PainLoc: Knee  Comment: Left       General: A and O x 3, ASA, NAD    SCLERA:    Normal    DENTITION:   Normal  Knee:  left    ALIGNMENT:     Varus  ,   Patella  tracks  midline    GAIT:    Antalgic with cane    SKIN:    No abnormality healing ecchymosis    RANGE OF MOTION:   5  -  120   DEG    STRENGTH:   4  / 5    LIGAMENTS:    No varus / valgus instability.   Negative  Lachman.    MENISCUS:     Negative   Viki       DISTAL PULSES:    Paplable    DISTAL SENSATION :   Intact    LYMPHATICS:     No   lymphadenopathy    OTHER:          - Positive   effusion      - Crepitance with ROM  Knee:  right    ALIGNMENT:     Valgus      GAIT:    Antalgic    SKIN:    No abnormality    RANGE OF MOTION:   5  -  100   DEG    STRENGTH:   4  / 5    LIGAMENTS:    No varus / valgus instability.   Negative  Lachman.    MENISCUS:     Negative   Viki       DISTAL PULSES:    Paplable    DISTAL SENSATION :   Intact    LYMPHATICS:     No   lymphadenopathy    OTHER:          - Positive   effusion      - Crepitance with ROM        Radiology:  Xrays 3views both knees (ap,lateral, sunrise) were ordered and reviewed for evaluation " of knee pain demonstratingmild left knee medial joint space narrowing, no fracture.  There is advanced valgus OA the right knee  todays xrays were compared to previous xrays and demonstrate no change    Assessment/Plan:  Left knee contusion.  She does have some arthritic change and I think her right knee which is very bad and scheduled to be replaced soon is putting more pressure on the left knee as well.  For now she'll keep working on range of motion exercises of both knees ice to the knees and we'll proceed as scheduled for surgery of the right knee on 10/1/18

## 2018-09-20 ENCOUNTER — TELEPHONE (OUTPATIENT)
Dept: ORTHOPEDIC SURGERY | Facility: CLINIC | Age: 68
End: 2018-09-20

## 2018-09-20 ENCOUNTER — APPOINTMENT (OUTPATIENT)
Dept: PREADMISSION TESTING | Facility: HOSPITAL | Age: 68
End: 2018-09-20

## 2018-09-20 VITALS
WEIGHT: 206 LBS | OXYGEN SATURATION: 97 % | DIASTOLIC BLOOD PRESSURE: 69 MMHG | RESPIRATION RATE: 20 BRPM | HEIGHT: 63 IN | BODY MASS INDEX: 36.5 KG/M2 | HEART RATE: 81 BPM | TEMPERATURE: 97.9 F | SYSTOLIC BLOOD PRESSURE: 120 MMHG

## 2018-09-20 DIAGNOSIS — M17.11 PRIMARY OSTEOARTHRITIS OF RIGHT KNEE: ICD-10-CM

## 2018-09-20 LAB
ANION GAP SERPL CALCULATED.3IONS-SCNC: 13.2 MMOL/L
BACTERIA UR QL AUTO: NORMAL /HPF
BILIRUB UR QL STRIP: NEGATIVE
BUN BLD-MCNC: 20 MG/DL (ref 8–23)
BUN/CREAT SERPL: 28.6 (ref 7–25)
CALCIUM SPEC-SCNC: 9.8 MG/DL (ref 8.6–10.5)
CHLORIDE SERPL-SCNC: 100 MMOL/L (ref 98–107)
CLARITY UR: CLEAR
CO2 SERPL-SCNC: 26.8 MMOL/L (ref 22–29)
COLOR UR: YELLOW
CREAT BLD-MCNC: 0.7 MG/DL (ref 0.57–1)
DEPRECATED RDW RBC AUTO: 43.5 FL (ref 37–54)
ERYTHROCYTE [DISTWIDTH] IN BLOOD BY AUTOMATED COUNT: 12.6 % (ref 11.7–13)
GFR SERPL CREATININE-BSD FRML MDRD: 83 ML/MIN/1.73
GLUCOSE BLD-MCNC: 89 MG/DL (ref 65–99)
GLUCOSE UR STRIP-MCNC: NEGATIVE MG/DL
HCT VFR BLD AUTO: 41.6 % (ref 35.6–45.5)
HGB BLD-MCNC: 13.2 G/DL (ref 11.9–15.5)
HGB UR QL STRIP.AUTO: NEGATIVE
HYALINE CASTS UR QL AUTO: NORMAL /LPF
KETONES UR QL STRIP: NEGATIVE
LEUKOCYTE ESTERASE UR QL STRIP.AUTO: ABNORMAL
MCH RBC QN AUTO: 30 PG (ref 26.9–32)
MCHC RBC AUTO-ENTMCNC: 31.7 G/DL (ref 32.4–36.3)
MCV RBC AUTO: 94.5 FL (ref 80.5–98.2)
NITRITE UR QL STRIP: NEGATIVE
PH UR STRIP.AUTO: <=5 [PH] (ref 5–8)
PLATELET # BLD AUTO: 262 10*3/MM3 (ref 140–500)
PMV BLD AUTO: 10 FL (ref 6–12)
POTASSIUM BLD-SCNC: 3.5 MMOL/L (ref 3.5–5.2)
PROT UR QL STRIP: NEGATIVE
RBC # BLD AUTO: 4.4 10*6/MM3 (ref 3.9–5.2)
RBC # UR: NORMAL /HPF
REF LAB TEST METHOD: NORMAL
SODIUM BLD-SCNC: 140 MMOL/L (ref 136–145)
SP GR UR STRIP: 1.02 (ref 1–1.03)
SQUAMOUS #/AREA URNS HPF: NORMAL /HPF
UROBILINOGEN UR QL STRIP: ABNORMAL
WBC NRBC COR # BLD: 5.42 10*3/MM3 (ref 4.5–10.7)
WBC UR QL AUTO: NORMAL /HPF

## 2018-09-20 PROCEDURE — 80048 BASIC METABOLIC PNL TOTAL CA: CPT | Performed by: ORTHOPAEDIC SURGERY

## 2018-09-20 PROCEDURE — 85027 COMPLETE CBC AUTOMATED: CPT | Performed by: ORTHOPAEDIC SURGERY

## 2018-09-20 PROCEDURE — 93005 ELECTROCARDIOGRAM TRACING: CPT

## 2018-09-20 PROCEDURE — 93010 ELECTROCARDIOGRAM REPORT: CPT | Performed by: INTERNAL MEDICINE

## 2018-09-20 PROCEDURE — 36415 COLL VENOUS BLD VENIPUNCTURE: CPT

## 2018-09-20 PROCEDURE — 81001 URINALYSIS AUTO W/SCOPE: CPT | Performed by: ORTHOPAEDIC SURGERY

## 2018-09-20 RX ORDER — METOPROLOL SUCCINATE 25 MG/1
12.5 TABLET, EXTENDED RELEASE ORAL DAILY
COMMUNITY
End: 2019-05-28 | Stop reason: SDUPTHER

## 2018-09-20 ASSESSMENT — KOOS JR
KOOS JR SCORE: 17
KOOS JR SCORE: 44.905

## 2018-09-20 NOTE — DISCHARGE INSTRUCTIONS
Take the following medications the morning of surgery with a small sip of water:  OMEPRAZOLE,LOSARTAN AND METOPROLOL      General Instructions:  • Do not eat solid food after midnight the night before surgery.  • You may drink clear liquids day of surgery but must stop at least one hour before your hospital arrival time.  • It is beneficial for you to have a clear drink that contains carbohydrates the day of surgery.  We suggest a 12 to 20 ounce bottle of Gatorade or Powerade for non-diabetic patients or a 12 to 20 ounce bottle of G2 or Powerade Zero for diabetic patients. (Pediatric patients, are not advised to drink a 12 to 20 ounce carbohydrate drink)    Clear liquids are liquids you can see through.  Nothing red in color.     Plain water                               Sports drinks  Sodas                                   Gelatin (Jell-O)  Fruit juices without pulp such as white grape juice and apple juice  Popsicles that contain no fruit or yogurt  Tea or coffee (no cream or milk added)  Gatorade / Powerade  G2 / Powerade Zero    • Infants may have breast milk up to four hours before surgery.  • Infants drinking formula may drink formula up to six hours before surgery.   • Patients who avoid smoking, chewing tobacco and alcohol for 4 weeks prior to surgery have a reduced risk of post-operative complications.  Quit smoking as many days before surgery as you can.  • Do not smoke, use chewing tobacco or drink alcohol the day of surgery.   • If applicable bring your C-PAP/ BI-PAP machine.  • Bring any papers given to you in the doctor’s office.  • Wear clean comfortable clothes and socks.  • Do not wear contact lenses or make-up.  Bring a case for your glasses.   • Bring crutches or walker if applicable.  • Remove all piercings.  Leave jewelry and any other valuables at home.  • Hair extensions with metal clips must be removed prior to surgery.  • The Pre-Admission Testing nurse will instruct you to bring  medications if unable to obtain an accurate list in Pre-Admission Testing.        If you were given a blood bank ID arm band remember to bring it with you the day of surgery.    Preventing a Surgical Site Infection:  • For 2 to 3 days before surgery, avoid shaving with a razor because the razor can irritate skin and make it easier to develop an infection.    • Any areas of open skin can increase the risk of a post-operative wound infection by allowing bacteria to enter and travel throughout the body.  Notify your surgeon if you have any skin wounds / rashes even if it is not near the expected surgical site.  The area will need assessed to determine if surgery should be delayed until it is healed.  • The night prior to surgery sleep in a clean bed with clean clothing.  Do not allow pets to sleep with you.  • Shower on the morning of surgery using a fresh bar of anti-bacterial soap (such as Dial) and clean washcloth.  Dry with a clean towel and dress in clean clothing.  • Ask your surgeon if you will be receiving antibiotics prior to surgery.  • Make sure you, your family, and all healthcare providers clean their hands with soap and water or an alcohol based hand  before caring for you or your wound.    Day of surgery: 10/1/2018 ARRIVAL TIME 5:30 AM  Upon arrival, a Pre-op nurse and Anesthesiologist will review your health history, obtain vital signs, and answer questions you may have.  The only belongings needed at this time will be your home medications and if applicable your C-PAP/BI-PAP machine.  If you are staying overnight your family can leave the rest of your belongings in the car and bring them to your room later.  A Pre-op nurse will start an IV and you may receive medication in preparation for surgery, including something to help you relax.  Your family will be able to see you in the Pre-op area.  While you are in surgery your family should notify the waiting room  if they leave the  waiting room area and provide a contact phone number.    Please be aware that surgery does come with discomfort.  We want to make every effort to control your discomfort so please discuss any uncontrolled symptoms with your nurse.   Your doctor will most likely have prescribed pain medications.      If you are going home after surgery you will receive individualized written care instructions before being discharged.  A responsible adult must drive you to and from the hospital on the day of your surgery and stay with you for 24 hours.    If you are staying overnight following surgery, you will be transported to your hospital room following the recovery period.  [facility] has all private rooms.    You have received a list of surgical assistants for your reference.  If you have any questions please call Pre-Admission Testing at [phone].  Deductibles and co-payments are collected on the day of service. Please be prepared to pay the required co-pay, deductible or deposit on the day of service as defined by your plan.    2% CHLORAHEXIDINE GLUCONATE* CLOTH  Preparing or “prepping” skin before surgery can reduce the risk of infection at the surgical site. To make the process easier, [facility] has chosen disposable cloths moistened with a rinse-free, 2% Chlorhexidine Gluconate (CHG) antiseptic solution. The steps below outline the prepping process and should be carefully followed.        Use the prep cloth on the area that is circled in the diagram             Directions Night before Surgery  1) Shower using a fresh bar of anti-bacterial soap (such as Dial) and clean washcloth.  Use a clean towel to completely dry your skin.  2) Do not use any lotions, oils or creams on your skin.  3) Open the package and remove 1 cloth, wipe your skin for 30 seconds in a circular motion.  Allow to dry for 3 minutes.  4) Repeat #3 with second cloth.  5) Do not touch your eyes, ears, or mouth with the prep  cloth.  6) Allow the wet prep solution to air dry.  7) Discard the prep cloth and wash your hands with soap and water.   8) Dress in clean bed clothes and sleep on fresh clean bed sheets.   9) You may experience some temporary itching after the prep.    Directions Day of Surgery  1) Repeat steps 1,2,3,4,5,6,7, and 9.   2) Dress in clean clothes before coming to the hospital.    BACTROBAN NASAL OINTMENT  There are many germs normally in your nose. Bactroban is an ointment that will help reduce these germs. Please follow these instructions for Bactroban use:      ____The day before surgery in the morning  Date________    ____The day before surgery in the evening              Date________    ____The day of surgery in the morning    Date________    **Squirt ½ package of Bactroban Ointment onto a cotton applicator and apply to inside of 1st nostril.  Squirt the remaining Bactroban and apply to the inside of the other nostril.

## 2018-09-21 NOTE — TELEPHONE ENCOUNTER
Call return to the patient.  I was unable to reach her but it did leave a message on her answering machine.  Have advised her that Dr. Sloan's rinse for DVT prophylaxis after total joint surgery is aspirin however if the patient does have an allergy to aspirin which she does his next recommendation would be Coumadin.  Left it open for her to call if she has any other questions or concerns

## 2018-09-27 ENCOUNTER — OFFICE VISIT (OUTPATIENT)
Dept: ORTHOPEDIC SURGERY | Facility: CLINIC | Age: 68
End: 2018-09-27

## 2018-09-27 VITALS
SYSTOLIC BLOOD PRESSURE: 118 MMHG | HEIGHT: 62 IN | WEIGHT: 206 LBS | DIASTOLIC BLOOD PRESSURE: 82 MMHG | BODY MASS INDEX: 37.91 KG/M2 | TEMPERATURE: 98.4 F

## 2018-09-27 DIAGNOSIS — M17.11 PRIMARY OSTEOARTHRITIS OF ONE KNEE, RIGHT: Primary | ICD-10-CM

## 2018-09-27 PROCEDURE — 77077 JOINT SURVEY SINGLE VIEW: CPT | Performed by: ORTHOPAEDIC SURGERY

## 2018-09-27 PROCEDURE — S0260 H&P FOR SURGERY: HCPCS | Performed by: NURSE PRACTITIONER

## 2018-09-28 ENCOUNTER — TELEPHONE (OUTPATIENT)
Dept: ORTHOPEDIC SURGERY | Facility: CLINIC | Age: 68
End: 2018-09-28

## 2018-09-28 NOTE — H&P
Patient: Chanell Tafoya    Date of Admission: 10/1/18    YOB: 1950    Medical Record Number: [id number]    Admitting Physician: Dr. Mathew Sloan    Reason for Admission: End Stage Right Knee OA    History of Present Illness: 68 y.o. female presents with severe end stage knee osteoarthritis which has not been responsive to the full compliment of conservative measures. Despite conservative attempts, there is still severe, constant activity limiting pain. Given the severity of the pain, the patient has elected to proceed with knee replacement.    Allergies:   Allergies   Allergen Reactions   • Amitriptyline Shortness Of Breath     Anaphylactic episode   • Asa [Aspirin] Other (See Comments)     Severe abdominal pain   • Azithromycin Other (See Comments)   • Clindamycin/Lincomycin Other (See Comments)     Blisters and erosions in esophagus & stomach   • Codeine Other (See Comments)     In large amounts causes headaches   • Dilaudid [Hydromorphone]      Causes chest pains/breathing problems when given by IV   • Fluconazole Other (See Comments)   • Gabapentin Diarrhea   • Ibuprofen Swelling     Swelling of hands and legs   • Lubiprostone Other (See Comments)   • Naproxen      Blisters in mouth/stomach   • Other      Surgical tape causes blisters     • Talwin [Pentazocine] Itching         Current Medications:  Scheduled Meds:  PRN Meds:.    PMH:     Past Medical History:   Diagnosis Date   • Arthritis    • Bradycardia    • Cardiac disease    • GERD (gastroesophageal reflux disease)    • History of cellulitis    • History of kidney stones    • History of staph infection     IN FOOT AFTER SURGERY   • Hypertension    • Pacemaker     medtronic   • Right knee pain    • Sarcoidosis    • Sleep apnea     cpap       PF/Surg/Soc Hx:     Past Surgical History:   Procedure Laterality Date   • APPENDECTOMY  1981   • CARDIAC CATHETERIZATION     • CHOLECYSTECTOMY  1998   • COLONOSCOPY     • ENDOSCOPY     • ESOPHAGEAL  "DILATATION  2015   • FOOT SURGERY Right 2012    3 screws   • HAND SURGERY Left 1993    thumb reconstruction    • HAND SURGERY Right 2007    thumb reconstruction   • HYSTERECTOMY  1981   • KIDNEY STONE SURGERY  2006   • KNEE SURGERY Right 2006    torn meniscus    • LUNG SURGERY Right 2001    lymph node biopsied (sarcoidosis)   • TONSILLECTOMY AND ADENOIDECTOMY  1955        Social History     Occupational History   • Not on file.     Social History Main Topics   • Smoking status: Never Smoker   • Smokeless tobacco: Never Used   • Alcohol use No   • Drug use: No   • Sexual activity: Defer      Social History     Social History Narrative   • No narrative on file        Family History   Problem Relation Age of Onset   • Heart disease Mother    • Hypertension Mother    • Heart failure Mother    • Diabetes Mother    • Aortic aneurysm Father    • Cancer Father         skin, prostate, bladder   • Diabetes Sister    • Malig Hyperthermia Neg Hx          Review of Systems:   A 14 point review of systems was performed, pertinent positives discussed above, all other systems are negative    Physical Exam: 68 y.o. female  Vital Signs :   Vitals:    09/27/18 1329   BP: 118/82   Temp: 98.4 °F (36.9 °C)   Weight: 93.4 kg (206 lb)   Height: 157.5 cm (62\")     General: Alert and Oriented x 3, No acute distress.  Psych: mood and affect appropriate; recent and remote memory intact  Eyes: conjunctiva clear; pupils equally round and reactive, sclera nonicteric  CV: no peripheral edema  Resp: normal respiratory effort  Skin: no rashes or wounds; normal turgor  Musculosketetal; pain and crepitance with knee range of motion  Vascular: palpable distal pulses    Xrays:  -3 views (AP, lateral, and sunrise) were reviewed demonstrating end-stage OA with bone on bone articulation.  -A full length AP xray was ordered today for purposes of operative alignment demonstrating end stage arthritic findings. There are no previous full length films for " review    Assessment:  End-stage Right knee osteoarthritis. Conservative measures have failed.      Plan:  The plan is to proceed with Right Total Knee Replacement. The patient voiced understanding of the risks, benefits, and alternative forms of treatment that were discussed with Dr Sloan at the time of scheduling. Patient is planning on going home with home health after 1-2 day stay.  She is unable to tolerate aspirin so we she will need to be on Coumadin postoperatively for 6 weeks    Verona Mckeon, APRN  9/28/2018

## 2018-09-28 NOTE — TELEPHONE ENCOUNTER
We usually give the patient a loading dose around 5-6 mg the first night, then usually 3-4 mg daily after that point and adjust according to INR

## 2018-09-28 NOTE — TELEPHONE ENCOUNTER
Patient says her cardiologist wants to know what dosage Coumadin she will be on post op.  Surgery is Monday 10/1

## 2018-10-01 ENCOUNTER — APPOINTMENT (OUTPATIENT)
Dept: GENERAL RADIOLOGY | Facility: HOSPITAL | Age: 68
End: 2018-10-01

## 2018-10-01 ENCOUNTER — ANESTHESIA (OUTPATIENT)
Dept: PERIOP | Facility: HOSPITAL | Age: 68
End: 2018-10-01

## 2018-10-01 ENCOUNTER — HOSPITAL ENCOUNTER (INPATIENT)
Facility: HOSPITAL | Age: 68
LOS: 3 days | Discharge: HOME HEALTH | End: 2018-10-06
Attending: ORTHOPAEDIC SURGERY | Admitting: ORTHOPAEDIC SURGERY

## 2018-10-01 ENCOUNTER — ANESTHESIA EVENT (OUTPATIENT)
Dept: PERIOP | Facility: HOSPITAL | Age: 68
End: 2018-10-01

## 2018-10-01 DIAGNOSIS — M17.11 PRIMARY OSTEOARTHRITIS OF RIGHT KNEE: ICD-10-CM

## 2018-10-01 DIAGNOSIS — R26.2 DIFFICULTY WALKING: Primary | ICD-10-CM

## 2018-10-01 PROCEDURE — 25010000002 VANCOMYCIN 10 G RECONSTITUTED SOLUTION: Performed by: ORTHOPAEDIC SURGERY

## 2018-10-01 PROCEDURE — 25010000002 VANCOMYCIN: Performed by: NURSE PRACTITIONER

## 2018-10-01 PROCEDURE — G0378 HOSPITAL OBSERVATION PER HR: HCPCS

## 2018-10-01 PROCEDURE — 25010000003 CEFAZOLIN IN DEXTROSE 2-4 GM/100ML-% SOLUTION: Performed by: ORTHOPAEDIC SURGERY

## 2018-10-01 PROCEDURE — 73560 X-RAY EXAM OF KNEE 1 OR 2: CPT

## 2018-10-01 PROCEDURE — 25010000002 PROPOFOL 10 MG/ML EMULSION: Performed by: NURSE ANESTHETIST, CERTIFIED REGISTERED

## 2018-10-01 PROCEDURE — 25010000003 CEFAZOLIN IN DEXTROSE 2-4 GM/100ML-% SOLUTION: Performed by: NURSE PRACTITIONER

## 2018-10-01 PROCEDURE — 25010000002 SUCCINYLCHOLINE PER 20 MG: Performed by: NURSE ANESTHETIST, CERTIFIED REGISTERED

## 2018-10-01 PROCEDURE — 25010000002 FENTANYL CITRATE (PF) 100 MCG/2ML SOLUTION: Performed by: NURSE ANESTHETIST, CERTIFIED REGISTERED

## 2018-10-01 PROCEDURE — 25010000002 DEXAMETHASONE PER 1 MG: Performed by: NURSE ANESTHETIST, CERTIFIED REGISTERED

## 2018-10-01 PROCEDURE — 27447 TOTAL KNEE ARTHROPLASTY: CPT | Performed by: NURSE PRACTITIONER

## 2018-10-01 PROCEDURE — C1776 JOINT DEVICE (IMPLANTABLE): HCPCS | Performed by: ORTHOPAEDIC SURGERY

## 2018-10-01 PROCEDURE — 25010000002 ONDANSETRON PER 1 MG: Performed by: NURSE ANESTHETIST, CERTIFIED REGISTERED

## 2018-10-01 PROCEDURE — G8979 MOBILITY GOAL STATUS: HCPCS

## 2018-10-01 PROCEDURE — C1713 ANCHOR/SCREW BN/BN,TIS/BN: HCPCS | Performed by: ORTHOPAEDIC SURGERY

## 2018-10-01 PROCEDURE — 27447 TOTAL KNEE ARTHROPLASTY: CPT | Performed by: ORTHOPAEDIC SURGERY

## 2018-10-01 PROCEDURE — 97161 PT EVAL LOW COMPLEX 20 MIN: CPT

## 2018-10-01 PROCEDURE — 97110 THERAPEUTIC EXERCISES: CPT

## 2018-10-01 PROCEDURE — G8978 MOBILITY CURRENT STATUS: HCPCS

## 2018-10-01 DEVICE — GENESIS II BICONVEX PATELLA 26MM
Type: IMPLANTABLE DEVICE | Site: KNEE | Status: FUNCTIONAL
Brand: GENESIS II

## 2018-10-01 DEVICE — GENESIS II NON-POROUS TIBIAL                                    BASEPLATE SIZE 3 RIGHT
Type: IMPLANTABLE DEVICE | Site: KNEE | Status: FUNCTIONAL
Brand: GENESIS II

## 2018-10-01 DEVICE — IMPLANTABLE DEVICE: Type: IMPLANTABLE DEVICE | Site: KNEE | Status: FUNCTIONAL

## 2018-10-01 DEVICE — LEGION PS HIGH FLEX XLPE SZ 3-4 10MM
Type: IMPLANTABLE DEVICE | Site: KNEE | Status: FUNCTIONAL
Brand: LEGION

## 2018-10-01 DEVICE — LEGION NARROW POSTERIOR STABILIZED                                    OXINIUM SIZE 5N RIGHT
Type: IMPLANTABLE DEVICE | Site: KNEE | Status: FUNCTIONAL
Brand: LEGION

## 2018-10-01 DEVICE — CMT BONE PALACOS R HI/VISC 1X40: Type: IMPLANTABLE DEVICE | Site: KNEE | Status: FUNCTIONAL

## 2018-10-01 RX ORDER — PANTOPRAZOLE SODIUM 40 MG/1
40 TABLET, DELAYED RELEASE ORAL EVERY MORNING
Status: DISCONTINUED | OUTPATIENT
Start: 2018-10-01 | End: 2018-10-06 | Stop reason: HOSPADM

## 2018-10-01 RX ORDER — ROCURONIUM BROMIDE 10 MG/ML
INJECTION, SOLUTION INTRAVENOUS AS NEEDED
Status: DISCONTINUED | OUTPATIENT
Start: 2018-10-01 | End: 2018-10-01 | Stop reason: SURG

## 2018-10-01 RX ORDER — CEFAZOLIN SODIUM 2 G/100ML
2 INJECTION, SOLUTION INTRAVENOUS ONCE
Status: COMPLETED | OUTPATIENT
Start: 2018-10-01 | End: 2018-10-01

## 2018-10-01 RX ORDER — SODIUM CHLORIDE 0.9 % (FLUSH) 0.9 %
1-10 SYRINGE (ML) INJECTION AS NEEDED
Status: DISCONTINUED | OUTPATIENT
Start: 2018-10-01 | End: 2018-10-01 | Stop reason: HOSPADM

## 2018-10-01 RX ORDER — MIDAZOLAM HYDROCHLORIDE 1 MG/ML
1 INJECTION INTRAMUSCULAR; INTRAVENOUS
Status: DISCONTINUED | OUTPATIENT
Start: 2018-10-01 | End: 2018-10-01 | Stop reason: HOSPADM

## 2018-10-01 RX ORDER — DOCUSATE SODIUM 100 MG/1
100 CAPSULE, LIQUID FILLED ORAL 2 TIMES DAILY
Status: DISCONTINUED | OUTPATIENT
Start: 2018-10-01 | End: 2018-10-06 | Stop reason: HOSPADM

## 2018-10-01 RX ORDER — BISACODYL 10 MG
10 SUPPOSITORY, RECTAL RECTAL DAILY PRN
Status: DISCONTINUED | OUTPATIENT
Start: 2018-10-01 | End: 2018-10-06 | Stop reason: HOSPADM

## 2018-10-01 RX ORDER — LOSARTAN POTASSIUM 50 MG/1
50 TABLET ORAL DAILY
Status: DISCONTINUED | OUTPATIENT
Start: 2018-10-01 | End: 2018-10-06 | Stop reason: HOSPADM

## 2018-10-01 RX ORDER — WARFARIN SODIUM 4 MG/1
4 TABLET ORAL
Status: DISCONTINUED | OUTPATIENT
Start: 2018-10-02 | End: 2018-10-03

## 2018-10-01 RX ORDER — HYDROCODONE BITARTRATE AND ACETAMINOPHEN 7.5; 325 MG/1; MG/1
2 TABLET ORAL EVERY 4 HOURS PRN
Status: DISCONTINUED | OUTPATIENT
Start: 2018-10-01 | End: 2018-10-04

## 2018-10-01 RX ORDER — ACETAMINOPHEN 10 MG/ML
1000 INJECTION, SOLUTION INTRAVENOUS ONCE
Status: COMPLETED | OUTPATIENT
Start: 2018-10-01 | End: 2018-10-01

## 2018-10-01 RX ORDER — PROMETHAZINE HYDROCHLORIDE 25 MG/1
25 TABLET ORAL ONCE AS NEEDED
Status: DISCONTINUED | OUTPATIENT
Start: 2018-10-01 | End: 2018-10-01

## 2018-10-01 RX ORDER — FLUMAZENIL 0.1 MG/ML
0.2 INJECTION INTRAVENOUS AS NEEDED
Status: DISCONTINUED | OUTPATIENT
Start: 2018-10-01 | End: 2018-10-01

## 2018-10-01 RX ORDER — ONDANSETRON 2 MG/ML
4 INJECTION INTRAMUSCULAR; INTRAVENOUS ONCE AS NEEDED
Status: DISCONTINUED | OUTPATIENT
Start: 2018-10-01 | End: 2018-10-01

## 2018-10-01 RX ORDER — ONDANSETRON 2 MG/ML
4 INJECTION INTRAMUSCULAR; INTRAVENOUS EVERY 6 HOURS PRN
Status: DISCONTINUED | OUTPATIENT
Start: 2018-10-01 | End: 2018-10-06 | Stop reason: HOSPADM

## 2018-10-01 RX ORDER — PROMETHAZINE HYDROCHLORIDE 25 MG/ML
12.5 INJECTION, SOLUTION INTRAMUSCULAR; INTRAVENOUS ONCE AS NEEDED
Status: DISCONTINUED | OUTPATIENT
Start: 2018-10-01 | End: 2018-10-01

## 2018-10-01 RX ORDER — PROMETHAZINE HYDROCHLORIDE 25 MG/1
25 SUPPOSITORY RECTAL ONCE AS NEEDED
Status: DISCONTINUED | OUTPATIENT
Start: 2018-10-01 | End: 2018-10-01

## 2018-10-01 RX ORDER — HYDROCHLOROTHIAZIDE 25 MG/1
25 TABLET ORAL DAILY
Status: DISCONTINUED | OUTPATIENT
Start: 2018-10-01 | End: 2018-10-06

## 2018-10-01 RX ORDER — SODIUM CHLORIDE, SODIUM LACTATE, POTASSIUM CHLORIDE, CALCIUM CHLORIDE 600; 310; 30; 20 MG/100ML; MG/100ML; MG/100ML; MG/100ML
9 INJECTION, SOLUTION INTRAVENOUS CONTINUOUS PRN
Status: DISCONTINUED | OUTPATIENT
Start: 2018-10-01 | End: 2018-10-01 | Stop reason: HOSPADM

## 2018-10-01 RX ORDER — PROPOFOL 10 MG/ML
VIAL (ML) INTRAVENOUS AS NEEDED
Status: DISCONTINUED | OUTPATIENT
Start: 2018-10-01 | End: 2018-10-01 | Stop reason: SURG

## 2018-10-01 RX ORDER — CEFAZOLIN SODIUM 2 G/100ML
2 INJECTION, SOLUTION INTRAVENOUS EVERY 8 HOURS
Status: COMPLETED | OUTPATIENT
Start: 2018-10-01 | End: 2018-10-02

## 2018-10-01 RX ORDER — METOPROLOL SUCCINATE 25 MG/1
12.5 TABLET, EXTENDED RELEASE ORAL DAILY
Status: DISCONTINUED | OUTPATIENT
Start: 2018-10-01 | End: 2018-10-06 | Stop reason: HOSPADM

## 2018-10-01 RX ORDER — MAGNESIUM HYDROXIDE 1200 MG/15ML
LIQUID ORAL AS NEEDED
Status: DISCONTINUED | OUTPATIENT
Start: 2018-10-01 | End: 2018-10-01 | Stop reason: HOSPADM

## 2018-10-01 RX ORDER — CEFAZOLIN SODIUM IN 0.9 % NACL 3 G/100 ML
3 INTRAVENOUS SOLUTION, PIGGYBACK (ML) INTRAVENOUS EVERY 8 HOURS
Status: DISCONTINUED | OUTPATIENT
Start: 2018-10-01 | End: 2018-10-01 | Stop reason: SDUPTHER

## 2018-10-01 RX ORDER — PREGABALIN 75 MG/1
150 CAPSULE ORAL ONCE
Status: COMPLETED | OUTPATIENT
Start: 2018-10-01 | End: 2018-10-01

## 2018-10-01 RX ORDER — FAMOTIDINE 10 MG/ML
20 INJECTION, SOLUTION INTRAVENOUS
Status: DISCONTINUED | OUTPATIENT
Start: 2018-10-01 | End: 2018-10-01 | Stop reason: HOSPADM

## 2018-10-01 RX ORDER — NALOXONE HCL 0.4 MG/ML
0.2 VIAL (ML) INJECTION AS NEEDED
Status: DISCONTINUED | OUTPATIENT
Start: 2018-10-01 | End: 2018-10-01

## 2018-10-01 RX ORDER — FENTANYL CITRATE 50 UG/ML
50 INJECTION, SOLUTION INTRAMUSCULAR; INTRAVENOUS
Status: DISCONTINUED | OUTPATIENT
Start: 2018-10-01 | End: 2018-10-01

## 2018-10-01 RX ORDER — HYDROCODONE BITARTRATE AND ACETAMINOPHEN 7.5; 325 MG/1; MG/1
1 TABLET ORAL EVERY 4 HOURS PRN
Status: DISCONTINUED | OUTPATIENT
Start: 2018-10-01 | End: 2018-10-04

## 2018-10-01 RX ORDER — FENTANYL CITRATE 50 UG/ML
INJECTION, SOLUTION INTRAMUSCULAR; INTRAVENOUS AS NEEDED
Status: DISCONTINUED | OUTPATIENT
Start: 2018-10-01 | End: 2018-10-01 | Stop reason: SURG

## 2018-10-01 RX ORDER — BUPIVACAINE HYDROCHLORIDE AND EPINEPHRINE 5; 5 MG/ML; UG/ML
INJECTION, SOLUTION EPIDURAL; INTRACAUDAL; PERINEURAL AS NEEDED
Status: DISCONTINUED | OUTPATIENT
Start: 2018-10-01 | End: 2018-10-01 | Stop reason: SURG

## 2018-10-01 RX ORDER — LABETALOL HYDROCHLORIDE 5 MG/ML
5 INJECTION, SOLUTION INTRAVENOUS
Status: DISCONTINUED | OUTPATIENT
Start: 2018-10-01 | End: 2018-10-01

## 2018-10-01 RX ORDER — LIDOCAINE HYDROCHLORIDE 20 MG/ML
INJECTION, SOLUTION INFILTRATION; PERINEURAL AS NEEDED
Status: DISCONTINUED | OUTPATIENT
Start: 2018-10-01 | End: 2018-10-01 | Stop reason: SURG

## 2018-10-01 RX ORDER — ONDANSETRON 2 MG/ML
INJECTION INTRAMUSCULAR; INTRAVENOUS AS NEEDED
Status: DISCONTINUED | OUTPATIENT
Start: 2018-10-01 | End: 2018-10-01 | Stop reason: SURG

## 2018-10-01 RX ORDER — ONDANSETRON 4 MG/1
4 TABLET, ORALLY DISINTEGRATING ORAL EVERY 6 HOURS PRN
Status: DISCONTINUED | OUTPATIENT
Start: 2018-10-01 | End: 2018-10-06 | Stop reason: HOSPADM

## 2018-10-01 RX ORDER — SUCCINYLCHOLINE CHLORIDE 20 MG/ML
INJECTION INTRAMUSCULAR; INTRAVENOUS AS NEEDED
Status: DISCONTINUED | OUTPATIENT
Start: 2018-10-01 | End: 2018-10-01 | Stop reason: SURG

## 2018-10-01 RX ORDER — TRANEXAMIC ACID 100 MG/ML
INJECTION, SOLUTION INTRAVENOUS AS NEEDED
Status: DISCONTINUED | OUTPATIENT
Start: 2018-10-01 | End: 2018-10-01 | Stop reason: SURG

## 2018-10-01 RX ORDER — UREA 10 %
3 LOTION (ML) TOPICAL NIGHTLY PRN
Status: DISCONTINUED | OUTPATIENT
Start: 2018-10-01 | End: 2018-10-06 | Stop reason: HOSPADM

## 2018-10-01 RX ORDER — DEXAMETHASONE SODIUM PHOSPHATE 10 MG/ML
INJECTION INTRAMUSCULAR; INTRAVENOUS AS NEEDED
Status: DISCONTINUED | OUTPATIENT
Start: 2018-10-01 | End: 2018-10-01 | Stop reason: SURG

## 2018-10-01 RX ORDER — WARFARIN SODIUM 6 MG/1
6 TABLET ORAL
Status: COMPLETED | OUTPATIENT
Start: 2018-10-01 | End: 2018-10-01

## 2018-10-01 RX ORDER — ONDANSETRON 4 MG/1
4 TABLET, FILM COATED ORAL EVERY 6 HOURS PRN
Status: DISCONTINUED | OUTPATIENT
Start: 2018-10-01 | End: 2018-10-06 | Stop reason: HOSPADM

## 2018-10-01 RX ORDER — DIPHENHYDRAMINE HCL 25 MG
25 CAPSULE ORAL EVERY 6 HOURS PRN
Status: DISCONTINUED | OUTPATIENT
Start: 2018-10-01 | End: 2018-10-01

## 2018-10-01 RX ORDER — MIDAZOLAM HYDROCHLORIDE 1 MG/ML
2 INJECTION INTRAMUSCULAR; INTRAVENOUS
Status: DISCONTINUED | OUTPATIENT
Start: 2018-10-01 | End: 2018-10-01 | Stop reason: HOSPADM

## 2018-10-01 RX ORDER — EPHEDRINE SULFATE 50 MG/ML
5 INJECTION, SOLUTION INTRAVENOUS ONCE AS NEEDED
Status: DISCONTINUED | OUTPATIENT
Start: 2018-10-01 | End: 2018-10-01

## 2018-10-01 RX ADMIN — ONDANSETRON 4 MG: 2 INJECTION INTRAMUSCULAR; INTRAVENOUS at 08:30

## 2018-10-01 RX ADMIN — HYDROCHLOROTHIAZIDE 25 MG: 25 TABLET ORAL at 11:12

## 2018-10-01 RX ADMIN — VANCOMYCIN HYDROCHLORIDE 1500 MG: 10 INJECTION, POWDER, LYOPHILIZED, FOR SOLUTION INTRAVENOUS at 06:59

## 2018-10-01 RX ADMIN — ROCURONIUM BROMIDE 30 MG: 10 INJECTION INTRAVENOUS at 07:18

## 2018-10-01 RX ADMIN — SUCCINYLCHOLINE CHLORIDE 140 MG: 20 INJECTION, SOLUTION INTRAMUSCULAR; INTRAVENOUS; PARENTERAL at 07:13

## 2018-10-01 RX ADMIN — MUPIROCIN 10 APPLICATION: 20 OINTMENT TOPICAL at 20:16

## 2018-10-01 RX ADMIN — FENTANYL CITRATE 50 MCG: 50 INJECTION, SOLUTION INTRAMUSCULAR; INTRAVENOUS at 09:00

## 2018-10-01 RX ADMIN — ACETAMINOPHEN 1000 MG: 10 INJECTION, SOLUTION INTRAVENOUS at 07:26

## 2018-10-01 RX ADMIN — CEFAZOLIN SODIUM 2 G: 2 INJECTION, SOLUTION INTRAVENOUS at 15:13

## 2018-10-01 RX ADMIN — HYDROCODONE BITARTRATE AND ACETAMINOPHEN 2 TABLET: 7.5; 325 TABLET ORAL at 23:59

## 2018-10-01 RX ADMIN — FENTANYL CITRATE 50 MCG: 50 INJECTION, SOLUTION INTRAMUSCULAR; INTRAVENOUS at 09:10

## 2018-10-01 RX ADMIN — CEFAZOLIN SODIUM 2 G: 2 INJECTION, SOLUTION INTRAVENOUS at 23:59

## 2018-10-01 RX ADMIN — TRANEXAMIC ACID 1000 MG: 100 INJECTION, SOLUTION INTRAVENOUS at 08:25

## 2018-10-01 RX ADMIN — FENTANYL CITRATE 50 MCG: 50 INJECTION INTRAMUSCULAR; INTRAVENOUS at 07:17

## 2018-10-01 RX ADMIN — ROCURONIUM BROMIDE 10 MG: 10 INJECTION INTRAVENOUS at 07:13

## 2018-10-01 RX ADMIN — VANCOMYCIN HYDROCHLORIDE 1250 MG: 10 INJECTION, POWDER, LYOPHILIZED, FOR SOLUTION INTRAVENOUS at 19:40

## 2018-10-01 RX ADMIN — WARFARIN SODIUM 6 MG: 6 TABLET ORAL at 16:59

## 2018-10-01 RX ADMIN — LIDOCAINE HYDROCHLORIDE 60 MG: 20 INJECTION, SOLUTION INFILTRATION; PERINEURAL at 07:13

## 2018-10-01 RX ADMIN — MUPIROCIN 10 APPLICATION: 20 OINTMENT TOPICAL at 11:12

## 2018-10-01 RX ADMIN — FENTANYL CITRATE 50 MCG: 50 INJECTION INTRAMUSCULAR; INTRAVENOUS at 08:32

## 2018-10-01 RX ADMIN — TRANEXAMIC ACID 1000 MG: 100 INJECTION, SOLUTION INTRAVENOUS at 07:23

## 2018-10-01 RX ADMIN — POLYETHYLENE GLYCOL 3350 17 G: 17 POWDER, FOR SOLUTION ORAL at 11:12

## 2018-10-01 RX ADMIN — PREGABALIN 150 MG: 75 CAPSULE ORAL at 06:57

## 2018-10-01 RX ADMIN — FENTANYL CITRATE 50 MCG: 50 INJECTION INTRAMUSCULAR; INTRAVENOUS at 08:34

## 2018-10-01 RX ADMIN — DOCUSATE SODIUM 100 MG: 100 CAPSULE, LIQUID FILLED ORAL at 20:16

## 2018-10-01 RX ADMIN — HYDROCODONE BITARTRATE AND ACETAMINOPHEN 2 TABLET: 7.5; 325 TABLET ORAL at 19:39

## 2018-10-01 RX ADMIN — SODIUM CHLORIDE, POTASSIUM CHLORIDE, SODIUM LACTATE AND CALCIUM CHLORIDE 9 ML/HR: 600; 310; 30; 20 INJECTION, SOLUTION INTRAVENOUS at 06:58

## 2018-10-01 RX ADMIN — FENTANYL CITRATE 50 MCG: 50 INJECTION INTRAMUSCULAR; INTRAVENOUS at 07:47

## 2018-10-01 RX ADMIN — BUPIVACAINE HYDROCHLORIDE AND EPINEPHRINE BITARTRATE 20 ML: 5; .0091 INJECTION, SOLUTION EPIDURAL; INTRACAUDAL; PERINEURAL at 06:46

## 2018-10-01 RX ADMIN — SODIUM CHLORIDE, POTASSIUM CHLORIDE, SODIUM LACTATE AND CALCIUM CHLORIDE: 600; 310; 30; 20 INJECTION, SOLUTION INTRAVENOUS at 07:10

## 2018-10-01 RX ADMIN — SUGAMMADEX 200 MG: 100 INJECTION, SOLUTION INTRAVENOUS at 08:38

## 2018-10-01 RX ADMIN — DEXAMETHASONE SODIUM PHOSPHATE 8 MG: 10 INJECTION INTRAMUSCULAR; INTRAVENOUS at 07:21

## 2018-10-01 RX ADMIN — DOCUSATE SODIUM 100 MG: 100 CAPSULE, LIQUID FILLED ORAL at 11:12

## 2018-10-01 RX ADMIN — POLYETHYLENE GLYCOL 3350 17 G: 17 POWDER, FOR SOLUTION ORAL at 20:16

## 2018-10-01 RX ADMIN — HYDROCODONE BITARTRATE AND ACETAMINOPHEN 2 TABLET: 7.5; 325 TABLET ORAL at 15:12

## 2018-10-01 RX ADMIN — FENTANYL CITRATE 50 MCG: 50 INJECTION, SOLUTION INTRAMUSCULAR; INTRAVENOUS at 09:45

## 2018-10-01 RX ADMIN — CEFAZOLIN SODIUM 2 G: 2 INJECTION, SOLUTION INTRAVENOUS at 07:10

## 2018-10-01 RX ADMIN — PROPOFOL 200 MG: 10 INJECTION, EMULSION INTRAVENOUS at 07:13

## 2018-10-01 RX ADMIN — HYDROCODONE BITARTRATE AND ACETAMINOPHEN 2 TABLET: 7.5; 325 TABLET ORAL at 11:12

## 2018-10-01 NOTE — PERIOPERATIVE NURSING NOTE
"Patient states she can not use CPap machine in recovery.  States \"causes left nare swelling\".  Dr. Bhandari informed.  Informed Dr. Sloan of allergies and Meloxicam not given and orders not released. States OK.  Lyrica ordered and given per Dr. Jacome verbal orders.  Vancomycin held until patient spoke with Dr. Sloan at her request.  RN explained to patient Vancomycin not related to Clindamycin but patient wanted to wait for MD to advise.  Per Dr. Sloan,Vancomycin OK to give and Dr. Sloan spoke with the patient who agreed to give.     "

## 2018-10-01 NOTE — ANESTHESIA POSTPROCEDURE EVALUATION
Patient: Chanell Tafoya    Procedure Summary     Date:  10/01/18 Room / Location:  [facility] OR [address] [place] [address] MAIN OR    Anesthesia Start:  0710 Anesthesia Stop:  0855    Procedure:  TOTAL KNEE ARTHROPLASTY (Right Knee) Diagnosis:       Primary osteoarthritis of right knee      (Primary osteoarthritis of right knee [M17.11])    Surgeon:  Mathew Sloan MD Provider:  Manuel Bhandari MD    Anesthesia Type:  general ASA Status:  3          Anesthesia Type: general  Last vitals  BP   147/85 (10/01/18 0945)   Temp   36.4 °C (97.6 °F) (10/01/18 0855)   Pulse   62 (10/01/18 0945)   Resp   16 (10/01/18 0945)     SpO2   95 % (10/01/18 0945)     Post Anesthesia Care and Evaluation    Patient location during evaluation: PACU  Level of consciousness: awake and alert  Anesthetic complications: No anesthetic complications

## 2018-10-01 NOTE — ANESTHESIA PREPROCEDURE EVALUATION
Anesthesia Evaluation     Patient summary reviewed   NPO Solid Status: > 8 hours             Airway   Mallampati: II  No difficulty expected  Dental      Pulmonary    (+) sleep apnea,   Cardiovascular     ECG reviewed  Rhythm: regular    (+) pacemaker, hypertension,       Neuro/Psych  GI/Hepatic/Renal/Endo      Musculoskeletal     Abdominal    Substance History      OB/GYN          Other                        Anesthesia Plan    ASA 3     general     Anesthetic plan, all risks, benefits, and alternatives have been provided, discussed and informed consent has been obtained with: patient.

## 2018-10-01 NOTE — THERAPY EVALUATION
Acute Care - Physical Therapy Initial Evaluation  [facility]     Patient Name: Chanell Tafoya  : 1950  MRN: [id number]  Today's Date: 10/1/2018   Onset of Illness/Injury or Date of Surgery: 10/01/18  Date of Referral to PT: 10/01/18  Referring Physician: Mathew Fontanez      Admit Date: 10/1/2018    Visit Dx:     ICD-10-CM ICD-9-CM   1. Difficulty walking R26.2 719.7   2. Primary osteoarthritis of right knee M17.11 715.16     Patient Active Problem List   Diagnosis   • Primary osteoarthritis of right knee     Past Medical History:   Diagnosis Date   • Arthritis    • Bradycardia    • Cardiac disease    • GERD (gastroesophageal reflux disease)    • History of cellulitis    • History of kidney stones    • History of staph infection     IN FOOT AFTER SURGERY   • Hypertension    • Pacemaker     medtronic   • Right knee pain    • Sarcoidosis    • Sleep apnea     cpap     Past Surgical History:   Procedure Laterality Date   • APPENDECTOMY     • CARDIAC CATHETERIZATION     • CHOLECYSTECTOMY     • COLONOSCOPY     • ENDOSCOPY     • ESOPHAGEAL DILATATION     • FOOT SURGERY Right     3 screws   • HAND SURGERY Left     thumb reconstruction    • HAND SURGERY Right     thumb reconstruction   • HYSTERECTOMY     • KIDNEY STONE SURGERY     • KNEE SURGERY Right     torn meniscus    • LUNG SURGERY Right     lymph node biopsied (sarcoidosis)   • TONSILLECTOMY AND ADENOIDECTOMY          PT ASSESSMENT (last 12 hours)      Physical Therapy Evaluation     Row Name 10/01/18 1143          PT Evaluation Time/Intention    Subjective Information complains of;fatigue;pain  -MS     Document Type evaluation  -MS     Mode of Treatment physical therapy;individual therapy  -MS     Patient Effort good  -MS     Comment Pt. reports pain/soreness in her Right knee this AM. Otherwise, pt. agreeable to work with P.T.  -MS     Row Name 10/01/18 1143          General Information    Onset of  Illness/Injury or Date of Surgery 10/01/18  -MS     Referring Physician Mathew Fontanez  -MS     Patient Observations agree to therapy;cooperative  -MS     Prior Level of Function independent:  -MS     Equipment Currently Used at Home none  -MS     Pertinent History of Current Functional Problem Pt. is s/p Right TKR  -MS     Existing Precautions/Restrictions fall  -MS     Equipment Ordered for Patient bedside commode   Pt. already owns a Rwx for home use.  -MS     Risks Reviewed patient and family:  -MS     Benefits Reviewed patient and family:  -MS     Barriers to Rehab none identified  -MS     Row Name 10/01/18 1143          Cognitive Assessment/Intervention- PT/OT    Orientation Status (Cognition) oriented x 3  -MS     Follows Commands (Cognition) WNL  -MS     Personal Safety Interventions fall prevention program maintained;gait belt;nonskid shoes/slippers when out of bed;supervised activity  -MS     Row Name 10/01/18 1143          Mobility Assessment/Treatment    Extremity Weight-bearing Status right lower extremity  -MS     Right Lower Extremity (Weight-bearing Status) weight-bearing as tolerated (WBAT)  -MS     Row Name 10/01/18 1143          Bed Mobility Assessment/Treatment    Bed Mobility Assessment/Treatment supine-sit;sit-supine  -MS     Supine-Sit [place] (Bed Mobility) independent  -MS     Row Name 10/01/18 1143          Transfer Assessment/Treatment    Transfer Assessment/Treatment sit-stand transfer;stand-sit transfer  -MS     Sit-Stand [place] (Transfers) contact guard;2 person assist  -MS     Stand-Sit [place] (Transfers) contact guard;2 person assist  -MS     Row Name 10/01/18 1143          Sit-Stand Transfer    Assistive Device (Sit-Stand Transfers) walker, front-wheeled  -MS     Row Name 10/01/18 1143          Stand-Sit Transfer    Assistive Device (Stand-Sit Transfers) walker, front-wheeled  -MS     Row Name 10/01/18 1143          Gait/Stairs Assessment/Training    [place]  Level (Gait) contact guard;2 person assist  -MS     Assistive Device (Gait) walker, front-wheeled  -MS     Distance in Feet (Gait) 40 feet  -MS     Pattern (Gait) step-through  -MS     Deviations/Abnormal Patterns (Gait) antalgic;amber decreased  -MS     Comment (Gait/Stairs) Verbal/tactile cues for posture correction and for proper gait sequencing with use of the Rwx.  -MS     Row Name 10/01/18 1143          General ROM    GENERAL ROM COMMENTS BUE/LLE (WFL's)  -MS     Row Name 10/01/18 1143          MMT (Manual Muscle Testing)    General MMT Comments BUE/LLE (>/= 3/5)  -MS     Row Name 10/01/18 1143          Therapeutic Exercise    Comment (Therapeutic Exercise) Right TKR Protocol x 10 reps completed with assist.  -MS     Row Name 10/01/18 1143          Pain Assessment    Additional Documentation Pain Scale: Numbers Pre/Post-Treatment (Group)  -MS     Row Name 10/01/18 1143          Pain Scale: Numbers Pre/Post-Treatment    Pain Scale: Numbers, Pretreatment 3/10  -MS     Pain Scale: Numbers, Post-Treatment 3/10  -MS     Pain Location - Side Right  -MS     Pain Location knee  -MS     Pain Intervention(s) Medication (See MAR);Cold applied;Repositioned;Elevated;Rest  -MS     Row Name             Wound 10/01/18 0835 Right knee incision    Wound - Properties Group Date first assessed: 10/01/18  -SB Time first assessed: 0835  -SB Side: Right  -SB Location: knee  -SB Type: incision  -SB    Row Name 10/01/18 1143          Physical Therapy Clinical Impression    Date of Referral to PT 10/01/18  -MS     Criteria for Skilled Interventions Met (PT Clinical Impression) treatment indicated  -MS     Rehab Potential (PT Clinical Summary) good, to achieve stated therapy goals  -MS     Row Name 10/01/18 1143          Physical Therapy Goals    Transfer Goal Selection (PT) transfer, PT goal 1  -MS     Gait Training Goal Selection (PT) gait training, PT goal 1  -MS     ROM Goal Selection (PT) ROM, PT goal 1  -MS     Stairs Goal  Selection (PT) stairs, PT goal 1  -MS     Additional Documentation ROM Goal Selection (PT) (Row);Stairs Goal Selection (PT) (Row)  -MS     Row Name 10/01/18 1143          Transfer Goal 1 (PT)    Activity/Assistive Device (Transfer Goal 1, PT) transfers, all;walker, rolling  -MS     [place] Level/Cues Needed (Transfer Goal 1, PT) independent  -MS     Time Frame (Transfer Goal 1, PT) long term goal (LTG);2 - 3 days  -MS     Row Name 10/01/18 1143          Gait Training Goal 1 (PT)    Activity/Assistive Device (Gait Training Goal 1, PT) gait (walking locomotion);walker, rolling  -MS     [place] Level (Gait Training Goal 1, PT) independent  -MS     Distance (Gait Goal 1, PT) 120 feet  -MS     Time Frame (Gait Training Goal 1, PT) long term goal (LTG);2 - 3 days  -MS     Row Name 10/01/18 1143          ROM Goal 1 (PT)    ROM Goal 1 (PT) Right knee AROM (5, 85)  -MS     Time Frame (ROM Goal 1, PT) long term goal (LTG);2 - 3 days  -MS     Row Name 10/01/18 1143          Stairs Goal 1 (PT)    Activity/Assistive Device (Stairs Goal 1, PT) stairs, all skills  -MS     [place] Level/Cues Needed (Stairs Goal 1, PT) contact guard assist  -MS     Number of Stairs (Stairs Goal 1, PT) 2  -MS     Time Frame (Stairs Goal 1, PT) long term goal (LTG);3 days  -MS     Row Name 10/01/18 1143          Positioning and Restraints    Pre-Treatment Position in bed  -MS     Post Treatment Position chair  -MS     In Chair notified nsg;sitting;reclined;call light within reach;encouraged to call for assist;with family/caregiver   All lines intact. Ice pack to Right knee.  -MS       User Key  (r) = Recorded By, (t) = Taken By, (c) = Cosigned By    Initials Name Provider Type    Alan Kevin, PT Physical Therapist    Cherie Velez, RN Registered Nurse          Physical Therapy Education     Title: PT OT SLP Therapies (Done)     Topic: Physical Therapy (Done)     Point: Mobility training (Done)    Learning Progress  Summary     Learner Status Readiness Method Response Comment Documented by    Patient Done Acceptance NESSA FLEMING NR  MS 10/01/18 1148          Point: Home exercise program (Done)    Learning Progress Summary     Learner Status Readiness Method Response Comment Documented by    Patient Done Acceptance NESSA FLEMING NR  MS 10/01/18 1148          Point: Body mechanics (Done)    Learning Progress Summary     Learner Status Readiness Method Response Comment Documented by    Patient Done Acceptance NESSA FLEMING NR  MS 10/01/18 1148          Point: Precautions (Done)    Learning Progress Summary     Learner Status Readiness Method Response Comment Documented by    Patient Done Acceptance NESSA FLEMING NR  MS 10/01/18 1148                      User Key     Initials Effective Dates Name Provider Type Discipline    MS 04/03/18 -  Alan Godinez, PT Physical Therapist PT                PT Recommendation and Plan  Anticipated Discharge Disposition (PT): home with assist, home with home health  Planned Therapy Interventions (PT Eval): balance training, bed mobility training, gait training, home exercise program, patient/family education, postural re-education, ROM (range of motion), stair training, strengthening, transfer training  Therapy Frequency (PT Clinical Impression): 2 times/day  Outcome Summary/Treatment Plan (PT)  Anticipated Discharge Disposition (PT): home with assist, home with home health  Plan of Care Reviewed With: patient  Outcome Summary: Pt. will benefit from skilled inpt. P.T. to address her functional deficits and to assist pt. in regaining her maximum level of independence with functional mobility.          Outcome Measures     Row Name 10/01/18 1100             How much help from another person do you currently need...    Turning from your back to your side while in flat bed without using bedrails? 4  -MS      Moving from lying on back to sitting on the side of a flat bed without bedrails? 4  -MS      Moving to and from a bed  to a chair (including a wheelchair)? 3  -MS      Standing up from a chair using your arms (e.g., wheelchair, bedside chair)? 3  -MS      Climbing 3-5 steps with a railing? 3  -MS      To walk in hospital room? 3  -MS      AM-PAC 6 Clicks Score 20  -MS         Functional Assessment    Outcome Measure Options AM-PAC 6 Clicks Basic Mobility (PT)  -MS        User Key  (r) = Recorded By, (t) = Taken By, (c) = Cosigned By    Initials Name Provider Type    MS MeyerGodinezAlan majano, PT Physical Therapist           Time Calculation:         PT Charges     Row Name 10/01/18 1149             Time Calculation    Start Time 1110  -MS      Stop Time 1130  -MS      Time Calculation (min) 20 min  -MS      PT Received On 10/01/18  -MS      PT - Next Appointment 10/02/18  -MS      PT Goal Re-Cert Due Date 10/03/18  -MS         Time Calculation- PT    Total Timed Code Minutes- PT 15 minute(s)  -MS        User Key  (r) = Recorded By, (t) = Taken By, (c) = Cosigned By    Initials Name Provider Type    Alan Kevin, PT Physical Therapist        Therapy Suggested Charges     Code   Minutes Charges    None           Therapy Charges for Today     Code Description Service Date Service Provider Modifiers Qty    03094732187 HC PT MOBILITY CURRENT 10/1/2018 Alan Godinez, PT GP,  1    87713934757 HC PT MOBILITY PROJECTED 10/1/2018 Alan Godinez, PT GP,  1    63472099713 HC PT EVAL LOW COMPLEXITY 1 10/1/2018 Alan Godinez, PT GP 1    93077449488 HC PT THER PROC EA 15 MIN 10/1/2018 Alan Godinez, PT GP 1    59088331987 HC PT THER SUPP EA 15 MIN 10/1/2018 Alan Godinez, PT GP 1          PT G-Codes  PT Professional Judgement Used?: Yes  Outcome Measure Options: AM-PAC 6 Clicks Basic Mobility (PT)  AM-PAC 6 Clicks Score: 20  Functional Limitation: Mobility: Walking and moving around  Mobility: Walking and Moving Around Current Status (): At least 20 percent but less than 40 percent impaired, limited or  restricted  Mobility: Walking and Moving Around Goal Status (): 0 percent impaired, limited or restricted      Alan Godinez, PT  10/1/2018

## 2018-10-01 NOTE — ANESTHESIA PROCEDURE NOTES
Peripheral Block      Patient location during procedure: holding area  Stop time: 10/1/2018 6:47 AM  Reason for block: at surgeon's request and post-op pain management  Performed by  Anesthesiologist: HEAVENLY BURGOS  Preanesthetic Checklist  Completed: patient identified, site marked, surgical consent, pre-op evaluation, timeout performed, IV checked, risks and benefits discussed and monitors and equipment checked  Prep:  Pt Position: supine  Procedure  Sedation:yes    Guidance:ultrasound guided  ULTRASOUND INTERPRETATION.  Using ultrasound guidance a 21 G gauge needle was placed in close proximity to the femoral nerve, at which point, under ultrasound guidance anesthetic was injected in the area of the nerve and spread of the anesthesia was seen on ultrasound in close proximity thereto.  There were no abnormalities seen on ultrasound; a digital image was taken; and the patient tolerated the procedure with no complications. Images:still images obtained    Laterality:right  Block Type:adductor canal block  Injection Technique:single-shot  Medications  Local Injected:bupivacaine 0.5% with epinephrine Local Amount Injected:20mL

## 2018-10-01 NOTE — ANESTHESIA PROCEDURE NOTES
Airway  Urgency: elective    Airway not difficult    General Information and Staff    Patient location during procedure: OR  Anesthesiologist: HEAVENLY BURGOS  CRNA: LANDON MCFADDEN    Indications and Patient Condition  Indications for airway management: airway protection    Preoxygenated: yes  MILS maintained throughout  Mask difficulty assessment: 1 - vent by mask    Final Airway Details  Final airway type: endotracheal airway      Successful airway: ETT  Cuffed: yes   Successful intubation technique: direct laryngoscopy  Facilitating devices/methods: anterior pressure/BURP and intubating stylet  Endotracheal tube insertion site: oral  Blade: Pepe  Blade size: 2  ETT size: 7.0 mm  Cormack-Lehane Classification: grade I - full view of glottis  Placement verified by: chest auscultation   Cuff volume (mL): 6  Measured from: lips  ETT to lips (cm): 21  Number of attempts at approach: 1    Additional Comments  Pt preoxygenated, SIVI, bag mask vent, ATETI, dentition as before

## 2018-10-01 NOTE — PLAN OF CARE
Problem: Patient Care Overview  Goal: Plan of Care Review   10/01/18 1148   Coping/Psychosocial   Plan of Care Reviewed With patient   OTHER   Outcome Summary Pt. will benefit from skilled inpt. P.T. to address her functional deficits and to assist pt. in regaining her maximum level of independence with functional mobility.

## 2018-10-01 NOTE — H&P (VIEW-ONLY)
Patient: Chanell Tafoya    Date of Admission: 10/1/18    YOB: 1950    Medical Record Number: [id number]    Admitting Physician: Dr. Mathew Sloan    Reason for Admission: End Stage Right Knee OA    History of Present Illness: 68 y.o. female presents with severe end stage knee osteoarthritis which has not been responsive to the full compliment of conservative measures. Despite conservative attempts, there is still severe, constant activity limiting pain. Given the severity of the pain, the patient has elected to proceed with knee replacement.    Allergies:   Allergies   Allergen Reactions   • Amitriptyline Shortness Of Breath     Anaphylactic episode   • Asa [Aspirin] Other (See Comments)     Severe abdominal pain   • Azithromycin Other (See Comments)   • Clindamycin/Lincomycin Other (See Comments)     Blisters and erosions in esophagus & stomach   • Codeine Other (See Comments)     In large amounts causes headaches   • Dilaudid [Hydromorphone]      Causes chest pains/breathing problems when given by IV   • Fluconazole Other (See Comments)   • Gabapentin Diarrhea   • Ibuprofen Swelling     Swelling of hands and legs   • Lubiprostone Other (See Comments)   • Naproxen      Blisters in mouth/stomach   • Other      Surgical tape causes blisters     • Talwin [Pentazocine] Itching         Current Medications:  Scheduled Meds:  PRN Meds:.    PMH:     Past Medical History:   Diagnosis Date   • Arthritis    • Bradycardia    • Cardiac disease    • GERD (gastroesophageal reflux disease)    • History of cellulitis    • History of kidney stones    • History of staph infection     IN FOOT AFTER SURGERY   • Hypertension    • Pacemaker     medtronic   • Right knee pain    • Sarcoidosis    • Sleep apnea     cpap       PF/Surg/Soc Hx:     Past Surgical History:   Procedure Laterality Date   • APPENDECTOMY  1981   • CARDIAC CATHETERIZATION     • CHOLECYSTECTOMY  1998   • COLONOSCOPY     • ENDOSCOPY     • ESOPHAGEAL  "DILATATION  2015   • FOOT SURGERY Right 2012    3 screws   • HAND SURGERY Left 1993    thumb reconstruction    • HAND SURGERY Right 2007    thumb reconstruction   • HYSTERECTOMY  1981   • KIDNEY STONE SURGERY  2006   • KNEE SURGERY Right 2006    torn meniscus    • LUNG SURGERY Right 2001    lymph node biopsied (sarcoidosis)   • TONSILLECTOMY AND ADENOIDECTOMY  1955        Social History     Occupational History   • Not on file.     Social History Main Topics   • Smoking status: Never Smoker   • Smokeless tobacco: Never Used   • Alcohol use No   • Drug use: No   • Sexual activity: Defer      Social History     Social History Narrative   • No narrative on file        Family History   Problem Relation Age of Onset   • Heart disease Mother    • Hypertension Mother    • Heart failure Mother    • Diabetes Mother    • Aortic aneurysm Father    • Cancer Father         skin, prostate, bladder   • Diabetes Sister    • Malig Hyperthermia Neg Hx          Review of Systems:   A 14 point review of systems was performed, pertinent positives discussed above, all other systems are negative    Physical Exam: 68 y.o. female  Vital Signs :   Vitals:    09/27/18 1329   BP: 118/82   Temp: 98.4 °F (36.9 °C)   Weight: 93.4 kg (206 lb)   Height: 157.5 cm (62\")     General: Alert and Oriented x 3, No acute distress.  Psych: mood and affect appropriate; recent and remote memory intact  Eyes: conjunctiva clear; pupils equally round and reactive, sclera nonicteric  CV: no peripheral edema  Resp: normal respiratory effort  Skin: no rashes or wounds; normal turgor  Musculosketetal; pain and crepitance with knee range of motion  Vascular: palpable distal pulses    Xrays:  -3 views (AP, lateral, and sunrise) were reviewed demonstrating end-stage OA with bone on bone articulation.  -A full length AP xray was ordered today for purposes of operative alignment demonstrating end stage arthritic findings. There are no previous full length films for " review    Assessment:  End-stage Right knee osteoarthritis. Conservative measures have failed.      Plan:  The plan is to proceed with Right Total Knee Replacement. The patient voiced understanding of the risks, benefits, and alternative forms of treatment that were discussed with Dr Sloan at the time of scheduling. Patient is planning on going home with home health after 1-2 day stay.  She is unable to tolerate aspirin so we she will need to be on Coumadin postoperatively for 6 weeks    Verona Mckeon, APRN  9/28/2018

## 2018-10-01 NOTE — PLAN OF CARE
Problem: Patient Care Overview  Goal: Plan of Care Review  Outcome: Ongoing (interventions implemented as appropriate)   10/01/18 5705   Coping/Psychosocial   Plan of Care Reviewed With patient;spouse   OTHER   Outcome Summary Admitted to room 891 today from PACU. A&Ox4. C/O mild pain. Tolerating oral pain medication. Dressing to right knee c/d/i. Sally dressing. Hemovac in place. Good sensation and motion to bilateral feet. Voiding on her own. Orientated to room and call light. Vitals are stable.    Plan of Care Review   Progress improving     Goal: Individualization and Mutuality  Outcome: Ongoing (interventions implemented as appropriate)    Goal: Discharge Needs Assessment  Outcome: Ongoing (interventions implemented as appropriate)    Goal: Interprofessional Rounds/Family Conf  Outcome: Ongoing (interventions implemented as appropriate)      Problem: Fall Risk (Adult)  Goal: Identify Related Risk Factors and Signs and Symptoms  Outcome: Ongoing (interventions implemented as appropriate)    Goal: Absence of Fall  Outcome: Ongoing (interventions implemented as appropriate)      Problem: Knee Arthroplasty (Total, Partial) (Adult)  Goal: Signs and Symptoms of Listed Potential Problems Will be Absent, Minimized or Managed (Knee Arthroplasty)  Outcome: Ongoing (interventions implemented as appropriate)    Goal: Anesthesia/Sedation Recovery  Outcome: Ongoing (interventions implemented as appropriate)

## 2018-10-02 LAB
HCT VFR BLD AUTO: 39.3 % (ref 35.6–45.5)
HGB BLD-MCNC: 12.3 G/DL (ref 11.9–15.5)
INR PPP: 1.04 (ref 0.9–1.1)
INR PPP: 1.08 (ref 0.9–1.1)
PROTHROMBIN TIME: 13.4 SECONDS (ref 11.7–14.2)
PROTHROMBIN TIME: 13.8 SECONDS (ref 11.7–14.2)
TROPONIN T SERPL-MCNC: <0.01 NG/ML (ref 0–0.03)

## 2018-10-02 PROCEDURE — 85610 PROTHROMBIN TIME: CPT | Performed by: ORTHOPAEDIC SURGERY

## 2018-10-02 PROCEDURE — 93005 ELECTROCARDIOGRAM TRACING: CPT | Performed by: ORTHOPAEDIC SURGERY

## 2018-10-02 PROCEDURE — 0SRC069 REPLACEMENT OF RIGHT KNEE JOINT WITH OXIDIZED ZIRCONIUM ON POLYETHYLENE SYNTHETIC SUBSTITUTE, CEMENTED, OPEN APPROACH: ICD-10-PCS | Performed by: ORTHOPAEDIC SURGERY

## 2018-10-02 PROCEDURE — 84484 ASSAY OF TROPONIN QUANT: CPT | Performed by: ORTHOPAEDIC SURGERY

## 2018-10-02 PROCEDURE — 85014 HEMATOCRIT: CPT | Performed by: NURSE PRACTITIONER

## 2018-10-02 PROCEDURE — 93010 ELECTROCARDIOGRAM REPORT: CPT | Performed by: INTERNAL MEDICINE

## 2018-10-02 PROCEDURE — 94799 UNLISTED PULMONARY SVC/PX: CPT

## 2018-10-02 PROCEDURE — 85018 HEMOGLOBIN: CPT | Performed by: NURSE PRACTITIONER

## 2018-10-02 PROCEDURE — G0378 HOSPITAL OBSERVATION PER HR: HCPCS

## 2018-10-02 PROCEDURE — 85610 PROTHROMBIN TIME: CPT | Performed by: NURSE PRACTITIONER

## 2018-10-02 RX ORDER — OXYCODONE AND ACETAMINOPHEN 7.5; 325 MG/1; MG/1
1 TABLET ORAL EVERY 4 HOURS PRN
Status: DISCONTINUED | OUTPATIENT
Start: 2018-10-02 | End: 2018-10-04

## 2018-10-02 RX ORDER — PSEUDOEPHEDRINE HCL 30 MG
100 TABLET ORAL 2 TIMES DAILY
Qty: 26 CAPSULE | Refills: 0 | Status: SHIPPED | OUTPATIENT
Start: 2018-10-02 | End: 2018-10-15

## 2018-10-02 RX ORDER — HYDROCODONE BITARTRATE AND ACETAMINOPHEN 7.5; 325 MG/1; MG/1
TABLET ORAL
Qty: 84 TABLET | Refills: 0 | Status: SHIPPED | OUTPATIENT
Start: 2018-10-02 | End: 2018-10-05 | Stop reason: HOSPADM

## 2018-10-02 RX ORDER — ONDANSETRON 4 MG/1
4 TABLET, FILM COATED ORAL EVERY 6 HOURS PRN
Qty: 10 TABLET | Refills: 0 | Status: SHIPPED | OUTPATIENT
Start: 2018-10-02 | End: 2018-11-29

## 2018-10-02 RX ORDER — WARFARIN SODIUM 4 MG/1
TABLET ORAL
Qty: 30 TABLET | Refills: 0 | Status: SHIPPED | OUTPATIENT
Start: 2018-10-02 | End: 2018-11-01

## 2018-10-02 RX ORDER — KETOROLAC TROMETHAMINE 30 MG/ML
30 INJECTION, SOLUTION INTRAMUSCULAR; INTRAVENOUS EVERY 6 HOURS PRN
Status: DISCONTINUED | OUTPATIENT
Start: 2018-10-02 | End: 2018-10-06 | Stop reason: HOSPADM

## 2018-10-02 RX ORDER — OXYCODONE AND ACETAMINOPHEN 7.5; 325 MG/1; MG/1
2 TABLET ORAL EVERY 4 HOURS PRN
Status: DISCONTINUED | OUTPATIENT
Start: 2018-10-02 | End: 2018-10-04

## 2018-10-02 RX ADMIN — POLYETHYLENE GLYCOL 3350 17 G: 17 POWDER, FOR SOLUTION ORAL at 21:34

## 2018-10-02 RX ADMIN — METOPROLOL SUCCINATE 12.5 MG: 25 TABLET, FILM COATED, EXTENDED RELEASE ORAL at 08:20

## 2018-10-02 RX ADMIN — DOCUSATE SODIUM 100 MG: 100 CAPSULE, LIQUID FILLED ORAL at 21:34

## 2018-10-02 RX ADMIN — LOSARTAN POTASSIUM 50 MG: 50 TABLET, FILM COATED ORAL at 08:20

## 2018-10-02 RX ADMIN — WARFARIN SODIUM 4 MG: 4 TABLET ORAL at 18:12

## 2018-10-02 RX ADMIN — OXYCODONE HYDROCHLORIDE AND ACETAMINOPHEN 2 TABLET: 7.5; 325 TABLET ORAL at 12:57

## 2018-10-02 RX ADMIN — HYDROCHLOROTHIAZIDE 25 MG: 25 TABLET ORAL at 08:20

## 2018-10-02 RX ADMIN — HYDROCODONE BITARTRATE AND ACETAMINOPHEN 2 TABLET: 7.5; 325 TABLET ORAL at 09:00

## 2018-10-02 RX ADMIN — POLYETHYLENE GLYCOL 3350 17 G: 17 POWDER, FOR SOLUTION ORAL at 08:20

## 2018-10-02 RX ADMIN — PANTOPRAZOLE SODIUM 40 MG: 40 TABLET, DELAYED RELEASE ORAL at 07:11

## 2018-10-02 RX ADMIN — HYDROCODONE BITARTRATE AND ACETAMINOPHEN 2 TABLET: 7.5; 325 TABLET ORAL at 21:24

## 2018-10-02 RX ADMIN — HYDROCODONE BITARTRATE AND ACETAMINOPHEN 2 TABLET: 7.5; 325 TABLET ORAL at 04:11

## 2018-10-02 RX ADMIN — OXYCODONE HYDROCHLORIDE AND ACETAMINOPHEN 2 TABLET: 7.5; 325 TABLET ORAL at 16:57

## 2018-10-02 RX ADMIN — DOCUSATE SODIUM 100 MG: 100 CAPSULE, LIQUID FILLED ORAL at 08:20

## 2018-10-02 NOTE — SIGNIFICANT NOTE
10/02/18 1112   Rehab Treatment   Discipline physical therapy assistant   Reason Treatment Not Performed unable to treat, medical status change  (Pt having chest pains in the AM RN Marita was with pt.  PT to check back for 3 pm gym)   Recommendation   PT - Next Appointment 10/02/18

## 2018-10-02 NOTE — SIGNIFICANT NOTE
10/02/18 1350   Rehab Treatment   Discipline physical therapy assistant   Reason Treatment Not Performed patient/family declined treatment, not feeling well  (Pt stating that she is in too much pain to do therapy today and wants PT to check back tomorrow.  LEÓN [place] is aware)   Recommendation   PT - Next Appointment 10/03/18

## 2018-10-02 NOTE — OP NOTE
Name: Chanell Tafoya  YOB: 1950    DATE OF SURGERY: 10/1/18    PREOPERATIVE DIAGNOSIS: Right knee end-stage osteoarthritis    POSTOPERATIVE DIAGNOSIS: Right knee end-stage osteoarthritis    PROCEDURE PERFORMED: Right total knee replacement    SURGEON: Mathew Sloan M.D.    ASSISTANT: BRIDGETTE AGUAYO    IMPLANTS: [employer]:     Implant Name Type Inv. Item Serial No.  Lot No. LRB No. Used   CMT BONE PALACOS R HI/VISC 1X40 - [id number] Implant CMT BONE PALACOS R HI/VISC 1X40  [facility] [id number] Right 1   BASE TIB GEN2 NONPOR TI SZ3 RT - [id number] Implant BASE TIB GEN2 NONPOR TI SZ3 RT  [employer] [id number] Right 1   KN COMP LEGION PS OXINIUM - [id number] Implant KN COMP LEGION PS OXINIUM  [employer] [id number] Right 1   PAT GEN2 BICONVEX [id number] - [id number] Implant PAT GEN2 BICONVEX [id number]  [employer] [id number] Right 1   INSRT ART LEGION PS HF XLPE SZ3TO4 10MM - [id number] Implant INSRT ART LEGION PS HF XLPE SZ3TO4 10MM   [employer] [id number] Right 1       Estimated Blood Loss: 200cc  Specimens : none  Complications: none    DESCRIPTION OF PROCEDURE: The patient was taken to the operating room and placed in the supine position. A sequential compression device was carefully placed on the non-operative leg. Preoperative antibiotics were administered. Surgical time out was performed. After adequate induction of anesthesia, the leg was prepped and draped in the usual sterile fashion, exsanguinated with an Esmarch bandage and the tourniquet inflated to 250 mmHg. A midline incision was performed followed by a medial parapatellar arthrotomy. The patella was subluxed laterally.  A portion of the fat pad, ACL, and anterior horns of the meniscus were excised. The drill hole was placed in the distal femur and the canal was the irrigated and suctioned. The IM norberto was placed and a 5 degree distal valgus cut was performed on the femur. The femur was then  sized with a sizing guide. The femoral cutting block was placed and all femoral cuts were performed. The proximal tibia was exposed. We used the extramedullary tibial cutting guide set for removal of 9mm of bone off the high side. The tibial cut was performed. The posterior horns of the menisci were excised. The posterior osteophytes were removed. Flexion extension blocks were then used to balance the knee. The tibial cut surface was then sized with the sizing templates and the tibial and femoral trial were then placed. The knee was placed in full extension and then the tibial tray rotation was then matched to the femoral rotation and marked.    Attention was then placed to the patella. The patella was noted to track centrally through range of motion. The patella was then sized with the trials. The thickness of the patella was then measured. The patella was resurfaced and the surrounding osteophytes were removed. The preoperative thickness was reproduced. The patella tracked centrally through range of motion.   At this point all trial components were removed, the knee was copiously irrigated with pulsed lavage, and the knee was injected with anesthetic cocktail solution. The cut surfaces were then dried with clean lap sponges, and the components were cemented tibia, followed by femur, then patella. The knee was held in full extension and all excess cement was removed. The knee was held still until the cement had completely hardened. We then placed the trial polyethylene spacer which resulted in full extension and excellent flexion-extension balance. We placed the final polyethylene spacer.   The knee was then copiously irrigated. The tourniquet was then released. There was excellent hemostasis. We placed a one-eighth inch Hemovac drain. We closed the knee in multiple layers in standard fashion. Sterile dressing were applied. At the end of the case, the sponge and needle counts were reported as being correct. There  were no known complications. The patient was then transported to the recovery room.      Mathew Sloan M.D.

## 2018-10-02 NOTE — NURSING NOTE
Pt. with c/o of radiating chest pain to jaw. Stat EKG ordered. Dr. Mathew Sloan aware. [facility] Consulted.

## 2018-10-02 NOTE — DISCHARGE SUMMARY
Patient Name: Chanell Tafoya  Patient YOB: 1950    Date of Admission:  10/1/2018  Date of Discharge:  10/2/2018  Discharge Diagnosis: TOTAL KNEE ARTHROPLASTY  Presenting Problem/History of Present Illness: Primary osteoarthritis of right knee [M17.11]  Primary osteoarthritis of right knee [M17.11]  Admitting Physician: Dr Mathew Sloan  Consults:   Consults     No orders found for last 30 day(s).          DETAILS OF HOSPITAL STAY:  Patient is a 68 y.o. female was admitted to the floor following the above procedure and underwent an uncomplicated hospital stay.  Patient did well with physical therapy and was ambulating well without problems.  On the day of discharge the wound was clean, dry and intact and calf was soft and non tender and Homans sign was negative.  Patient was tolerating  without problems.  Patient will be discharged home.    Condition on Discharge:  Stable    Vital Signs  Temp:  [96.9 °F (36.1 °C)-97.9 °F (36.6 °C)] 96.9 °F (36.1 °C)  Heart Rate:  [58-74] 62  Resp:  [16] 16  BP: ()/(51-93) 122/72    LABS:      Admission on 10/01/2018   Component Date Value Ref Range Status   • Hemoglobin 10/02/2018 12.3  11.9 - 15.5 g/dL Final   • Hematocrit 10/02/2018 39.3  35.6 - 45.5 % Final   • Protime 10/02/2018 13.4  11.7 - 14.2 Seconds Final   • INR 10/02/2018 1.04  0.90 - 1.10 Final       No results found.    Discharge Medications     Discharge Medications      New Medications      Instructions Start Date   docusate sodium 100 MG capsule   100 mg, Oral, 2 Times Daily      HYDROcodone-acetaminophen 7.5-325 MG per tablet  Commonly known as:  NORCO   1-2 po q4-6 hr prn pain      ondansetron 4 MG tablet  Commonly known as:  ZOFRAN   4 mg, Oral, Every 6 Hours PRN      polyethylene glycol pack packet  Commonly known as:  MIRALAX   17 g, Oral, 2 Times Daily      warfarin 4 MG tablet  Commonly known as:  COUMADIN   Take as directed         Continue These Medications      Instructions Start Date    cholecalciferol 1000 units tablet  Commonly known as:  VITAMIN D3   1,000 Units, Oral, Daily      hydrochlorothiazide 25 MG tablet  Commonly known as:  HYDRODIURIL   25 mg, Oral, Daily      losartan 50 MG tablet  Commonly known as:  COZAAR   50 mg, Oral, Daily      metoprolol succinate XL 25 MG 24 hr tablet  Commonly known as:  TOPROL-XL   12.5 mg, Oral, Daily      omeprazole 20 MG capsule  Commonly known as:  priLOSEC   20 mg, Oral, Every Morning         Stop These Medications    acetaminophen 500 MG tablet  Commonly known as:  TYLENOL     CHLORHEXIDINE GLUCONATE CLOTH EX     mupirocin 2 % nasal ointment  Commonly known as:  BACTROBAN            Activity at Discharge:     Discharge Instructions: Patient is to continue with physical therapy exercises daily and continue working with the physical therapist as ordered. Patient may weight bear as tolerated. Apply ice regularly. Patient may ice for long periods of time as long as ice is not directly on the skin. Patient instructed on frequent calf pumping exercises.  Patient also instructed on incentive spirometer during hospitalization and encouraged to continue to use at home regularly.    *The dressing should be left in place until the suction unit stops functioning (typically 7 days.  If waterproof dressing is intact the patient may shower immediately following discharge. If the dressing becomes disloged or saturated it should be changed. Please refer to the BRANDON information sheet if you have any questions about the dressing.  If you have a home health nurse or therapist they can be contacted to assist with dressing change or repair. You may also call the BRANDON dressing hotline for questions related to the dressing ([phone]). If there still other problems or questions related to the dressing despite these measures then you can contact Amada at our office [phone].  After 1 week when the BRANDON dressing is removed, the wound should be gently cleaned with  antibacterial soap then allowed to dry and covered with a dry sterile dressing. The wound should be covered at all times except while showering.  Patient may change dressings daily and prn using sterile 4x4 and paper tape, and should call if any unusual drainage, redness or swelling.*  Follow up appointment in 2 weeks - patient to call the office at [phone] to schedule.  Patient will be discharged on coumadin as directed to keep INR 2-3.  Please draw PT / INR in 2 days and call to office during business hours at [phone].    Discharge Diagnosis:    Patient Active Problem List   Diagnosis   • Primary osteoarthritis of right knee       Follow-up Appointments  Future Appointments  Date Time Provider Department Center   10/16/2018 10:40 AM Nicolle Sloan MD [facility] None     Additional Instructions for the Follow-ups that You Need to Schedule     Referral to home health    As directed      PT to see for Home PT protocol. Daily for first week then 3x/ wk for second week. Staples to be removed at f/u appointment in 2 weeks.    Order Comments:  PT to see for Home PT protocol. Daily for first week then 3x/ wk for second week. Staples to be removed at f/u appointment in 2 weeks.     Face to Face Visit Date:  10/2/2018    Follow-up Provider for Plan of Care?:  I will be treating the patient on an ongoing basis.  Please send me the Plan of Care for signature.    Follow-up Provider:  NICOLLE SLOAN [[id number]]    Reason/Clinical Findings:  post op knee replacement    Describe mobility limitations that make leaving home difficult:  pain, swelling, weakness, limited mobility, difficulty ambulating without assistive device    Nursing/Therapeutic Services Requested:  Skilled Nursing Physicial Therapy    Skilled nursing orders:  Other (draw PT/INR in 2 days and call result to [phone])                  Nicolle Sloan MD  10/02/18  7:38 AM

## 2018-10-02 NOTE — PLAN OF CARE
Problem: Patient Care Overview  Goal: Plan of Care Review  Outcome: Ongoing (interventions implemented as appropriate)   10/02/18 1717   Coping/Psychosocial   Plan of Care Reviewed With patient   OTHER   Outcome Summary Pt w/ increased level of pain and decreased mobility today. Episode of chest pain this AM, stat EKG done. MD aware. [facility] consulted. No other c/o chest pain. Pain management w/ po percocet. Vital signs are stable and voiding function is intact. Family at bedside, will continue to monitor. D/C home tomorrow if medically stable.   Plan of Care Review   Progress improving       Problem: Fall Risk (Adult)  Goal: Absence of Fall  Outcome: Ongoing (interventions implemented as appropriate)   10/02/18 1717   Fall Risk (Adult)   Absence of Fall making progress toward outcome       Problem: Knee Arthroplasty (Total, Partial) (Adult)  Goal: Signs and Symptoms of Listed Potential Problems Will be Absent, Minimized or Managed (Knee Arthroplasty)  Outcome: Ongoing (interventions implemented as appropriate)   10/02/18 1717   Goal/Outcome Evaluation   Problems Assessed (Knee Arthroplasty) all   Problems Present (Knee Arthroplasty) pain;functional deficit     Goal: Anesthesia/Sedation Recovery  Outcome: Ongoing (interventions implemented as appropriate)   10/02/18 1717   Goal/Outcome Evaluation   Anesthesia/Sedation Recovery criteria met for discharge       Problem: Hypertensive Disease/Crisis (Arterial) (Adult)  Goal: Signs and Symptoms of Listed Potential Problems Will be Absent, Minimized or Managed (Hypertensive Disease/Crisis)  Outcome: Ongoing (interventions implemented as appropriate)

## 2018-10-02 NOTE — PLAN OF CARE
Problem: Patient Care Overview  Goal: Plan of Care Review  Outcome: Ongoing (interventions implemented as appropriate)   10/02/18 0201   Coping/Psychosocial   Plan of Care Reviewed With patient   OTHER   Outcome Summary POD #1. VSS. AMBULATING WELL WITH 1 ASSIST. PO PAIN MEDICATION HELPING WITH PAIN. VOIDING PER BRP. EDUCATION PROVIDED ON THE IMPORTANCE OF BLOOD PRESSURE MONITORING AND TAKING BP MEDICATION AS ORDERED    Plan of Care Review   Progress improving     Goal: Individualization and Mutuality  Outcome: Ongoing (interventions implemented as appropriate)    Goal: Discharge Needs Assessment  Outcome: Ongoing (interventions implemented as appropriate)      Problem: Fall Risk (Adult)  Goal: Identify Related Risk Factors and Signs and Symptoms  Outcome: Ongoing (interventions implemented as appropriate)    Goal: Absence of Fall  Outcome: Ongoing (interventions implemented as appropriate)      Problem: Hypertensive Disease/Crisis (Arterial) (Adult)  Goal: Signs and Symptoms of Listed Potential Problems Will be Absent, Minimized or Managed (Hypertensive Disease/Crisis)  Outcome: Ongoing (interventions implemented as appropriate)

## 2018-10-02 NOTE — PROGRESS NOTES
Continued Stay Note  [facility]     Patient Name: Chanell Tafoya  MRN: [id number]  Today's Date: 10/2/2018    Admit Date: 10/1/2018          Discharge Plan     Row Name 10/02/18 1623       Plan    Plan Comments Met w/ pt and family at the bedside, verified facesheet and d/c plan. Pt plans to d/c home w/ spouse and [facility]. Jossy/[facility] notified and will accept.     Row Name 10/02/18 1622       Plan    Plan Home w/ spouse and [facility].               Discharge Codes    No documentation.       Expected Discharge Date and Time     Expected Discharge Date Expected Discharge Time    Oct 2, 2018             Rosalinda Marshall RN

## 2018-10-03 PROBLEM — R07.9 CHEST PAIN: Status: ACTIVE | Noted: 2018-10-03

## 2018-10-03 PROBLEM — K59.00 CONSTIPATION: Status: ACTIVE | Noted: 2018-10-03

## 2018-10-03 PROBLEM — R26.2 DIFFICULTY WALKING: Status: ACTIVE | Noted: 2018-10-03

## 2018-10-03 LAB
ALBUMIN SERPL-MCNC: 3.8 G/DL (ref 3.5–5.2)
ALBUMIN/GLOB SERPL: 1.1 G/DL
ALP SERPL-CCNC: 105 U/L (ref 39–117)
ALT SERPL W P-5'-P-CCNC: 118 U/L (ref 1–33)
ANION GAP SERPL CALCULATED.3IONS-SCNC: 12.3 MMOL/L
AST SERPL-CCNC: 103 U/L (ref 1–32)
BASOPHILS # BLD AUTO: 0.04 10*3/MM3 (ref 0–0.2)
BASOPHILS NFR BLD AUTO: 0.4 % (ref 0–1.5)
BILIRUB SERPL-MCNC: 1.3 MG/DL (ref 0.1–1.2)
BUN BLD-MCNC: 11 MG/DL (ref 8–23)
BUN/CREAT SERPL: 14.5 (ref 7–25)
CALCIUM SPEC-SCNC: 9.3 MG/DL (ref 8.6–10.5)
CHLORIDE SERPL-SCNC: 99 MMOL/L (ref 98–107)
CO2 SERPL-SCNC: 27.7 MMOL/L (ref 22–29)
CREAT BLD-MCNC: 0.76 MG/DL (ref 0.57–1)
DEPRECATED RDW RBC AUTO: 45 FL (ref 37–54)
EOSINOPHIL # BLD AUTO: 0.05 10*3/MM3 (ref 0–0.7)
EOSINOPHIL NFR BLD AUTO: 0.4 % (ref 0.3–6.2)
ERYTHROCYTE [DISTWIDTH] IN BLOOD BY AUTOMATED COUNT: 12.9 % (ref 11.7–13)
GFR SERPL CREATININE-BSD FRML MDRD: 75 ML/MIN/1.73
GLOBULIN UR ELPH-MCNC: 3.6 GM/DL
GLUCOSE BLD-MCNC: 123 MG/DL (ref 65–99)
HCT VFR BLD AUTO: 36.1 % (ref 35.6–45.5)
HCT VFR BLD AUTO: 38.1 % (ref 35.6–45.5)
HGB BLD-MCNC: 11.3 G/DL (ref 11.9–15.5)
HGB BLD-MCNC: 11.9 G/DL (ref 11.9–15.5)
IMM GRANULOCYTES # BLD: 0.04 10*3/MM3 (ref 0–0.03)
IMM GRANULOCYTES NFR BLD: 0.4 % (ref 0–0.5)
INR PPP: 1.19 (ref 0.9–1.1)
LYMPHOCYTES # BLD AUTO: 1.56 10*3/MM3 (ref 0.9–4.8)
LYMPHOCYTES NFR BLD AUTO: 13.7 % (ref 19.6–45.3)
MCH RBC QN AUTO: 29.8 PG (ref 26.9–32)
MCHC RBC AUTO-ENTMCNC: 31.2 G/DL (ref 32.4–36.3)
MCV RBC AUTO: 95.5 FL (ref 80.5–98.2)
MONOCYTES # BLD AUTO: 1.2 10*3/MM3 (ref 0.2–1.2)
MONOCYTES NFR BLD AUTO: 10.5 % (ref 5–12)
NEUTROPHILS # BLD AUTO: 8.49 10*3/MM3 (ref 1.9–8.1)
NEUTROPHILS NFR BLD AUTO: 74.6 % (ref 42.7–76)
PLATELET # BLD AUTO: 288 10*3/MM3 (ref 140–500)
PMV BLD AUTO: 10 FL (ref 6–12)
POTASSIUM BLD-SCNC: 3.2 MMOL/L (ref 3.5–5.2)
PROT SERPL-MCNC: 7.4 G/DL (ref 6–8.5)
PROTHROMBIN TIME: 14.9 SECONDS (ref 11.7–14.2)
RBC # BLD AUTO: 3.99 10*6/MM3 (ref 3.9–5.2)
SODIUM BLD-SCNC: 139 MMOL/L (ref 136–145)
TROPONIN T SERPL-MCNC: <0.01 NG/ML (ref 0–0.03)
WBC NRBC COR # BLD: 11.38 10*3/MM3 (ref 4.5–10.7)

## 2018-10-03 PROCEDURE — 99024 POSTOP FOLLOW-UP VISIT: CPT | Performed by: ORTHOPAEDIC SURGERY

## 2018-10-03 PROCEDURE — 85018 HEMOGLOBIN: CPT | Performed by: NURSE PRACTITIONER

## 2018-10-03 PROCEDURE — 97150 GROUP THERAPEUTIC PROCEDURES: CPT

## 2018-10-03 PROCEDURE — 84484 ASSAY OF TROPONIN QUANT: CPT | Performed by: HOSPITALIST

## 2018-10-03 PROCEDURE — 85014 HEMATOCRIT: CPT | Performed by: NURSE PRACTITIONER

## 2018-10-03 PROCEDURE — 85025 COMPLETE CBC W/AUTO DIFF WBC: CPT | Performed by: HOSPITALIST

## 2018-10-03 PROCEDURE — 85610 PROTHROMBIN TIME: CPT | Performed by: ORTHOPAEDIC SURGERY

## 2018-10-03 PROCEDURE — 97110 THERAPEUTIC EXERCISES: CPT

## 2018-10-03 PROCEDURE — 80053 COMPREHEN METABOLIC PANEL: CPT | Performed by: HOSPITALIST

## 2018-10-03 RX ORDER — POTASSIUM CHLORIDE 1.5 G/1.77G
40 POWDER, FOR SOLUTION ORAL ONCE
Status: COMPLETED | OUTPATIENT
Start: 2018-10-04 | End: 2018-10-04

## 2018-10-03 RX ORDER — WARFARIN SODIUM 5 MG/1
5 TABLET ORAL
Status: DISCONTINUED | OUTPATIENT
Start: 2018-10-03 | End: 2018-10-06 | Stop reason: HOSPADM

## 2018-10-03 RX ADMIN — HYDROCODONE BITARTRATE AND ACETAMINOPHEN 2 TABLET: 7.5; 325 TABLET ORAL at 18:23

## 2018-10-03 RX ADMIN — PANTOPRAZOLE SODIUM 40 MG: 40 TABLET, DELAYED RELEASE ORAL at 08:02

## 2018-10-03 RX ADMIN — HYDROCODONE BITARTRATE AND ACETAMINOPHEN 2 TABLET: 7.5; 325 TABLET ORAL at 01:24

## 2018-10-03 RX ADMIN — HYDROCODONE BITARTRATE AND ACETAMINOPHEN 2 TABLET: 7.5; 325 TABLET ORAL at 10:18

## 2018-10-03 RX ADMIN — HYDROCODONE BITARTRATE AND ACETAMINOPHEN 2 TABLET: 7.5; 325 TABLET ORAL at 23:50

## 2018-10-03 RX ADMIN — HYDROCHLOROTHIAZIDE 25 MG: 25 TABLET ORAL at 08:06

## 2018-10-03 RX ADMIN — DOCUSATE SODIUM 100 MG: 100 CAPSULE, LIQUID FILLED ORAL at 08:06

## 2018-10-03 RX ADMIN — WARFARIN SODIUM 5 MG: 5 TABLET ORAL at 17:38

## 2018-10-03 RX ADMIN — HYDROCODONE BITARTRATE AND ACETAMINOPHEN 2 TABLET: 7.5; 325 TABLET ORAL at 06:51

## 2018-10-03 RX ADMIN — LOSARTAN POTASSIUM 50 MG: 50 TABLET, FILM COATED ORAL at 08:06

## 2018-10-03 RX ADMIN — METOPROLOL SUCCINATE 12.5 MG: 25 TABLET, FILM COATED, EXTENDED RELEASE ORAL at 08:06

## 2018-10-03 RX ADMIN — POLYETHYLENE GLYCOL 3350 17 G: 17 POWDER, FOR SOLUTION ORAL at 08:06

## 2018-10-03 RX ADMIN — HYDROCODONE BITARTRATE AND ACETAMINOPHEN 2 TABLET: 7.5; 325 TABLET ORAL at 14:16

## 2018-10-03 NOTE — PROGRESS NOTES
Orthopedic Progress Note      Patient: Chanell Tafoya    YOB: 1950    Medical Record Number: [id number]    Attending Physician: Mathew Sloan MD    Date of Admission: 10/1/2018  5:45 AM    Admitting Dx:  Primary osteoarthritis of right knee [M17.11]  Primary osteoarthritis of right knee [M17.11]    Status Post: TOTAL KNEE ARTHROPLASTY    Post Operative Day Number: 2    Current Problem List:   Patient Active Problem List   Diagnosis   • Primary osteoarthritis of right knee         Past Medical History:   Diagnosis Date   • Arthritis    • Bradycardia    • Cardiac disease    • GERD (gastroesophageal reflux disease)    • History of cellulitis    • History of kidney stones    • History of staph infection     IN FOOT AFTER SURGERY   • Hypertension    • Pacemaker     medtronic   • Right knee pain    • Sarcoidosis    • Sleep apnea     cpap       SUBJECTIVE: 68 y.o.  female .  Awake and alert.  Pain much better today.    OBJECTIVE:   Vitals:    10/02/18 1900 10/02/18 2327 10/03/18 0300 10/03/18 0753   BP: 110/60 123/65 114/69 121/61   BP Location: Left arm Left arm Left arm Left arm   Patient Position: Lying Lying Lying Lying   Pulse: 71 69 72 72   Resp: 16 16 16 16   Temp: 99.1 °F (37.3 °C) 99.5 °F (37.5 °C) 99 °F (37.2 °C) 98.9 °F (37.2 °C)   TempSrc:    Oral   SpO2: 96% 92% 93% 92%   Weight:       Height:         I/O last 3 completed shifts:  In: 1714 [P.O.:960; I.V.:754]  Out: 1600 [Urine:1600]    Current Medications:  Scheduled Meds:  docusate sodium 100 mg Oral BID   hydrochlorothiazide 25 mg Oral Daily   losartan 50 mg Oral Daily   metoprolol succinate XL 12.5 mg Oral Daily   pantoprazole 40 mg Oral QAM   polyethylene glycol 17 g Oral BID   warfarin 4 mg Oral Daily     PRN Meds:.•  bisacodyl  •  HYDROcodone-acetaminophen  •  HYDROcodone-acetaminophen  •  ketorolac  •  melatonin  •  ondansetron **OR** ondansetron ODT **OR** ondansetron  •  oxyCODONE-acetaminophen  •   oxyCODONE-acetaminophen    Diagnostic Tests:   Lab Results (last 24 hours)     Procedure Component Value Units Date/Time    Protime-INR [[id number]]  (Abnormal) Collected:  10/03/18 0322    Specimen:  Blood Updated:  10/03/18 0414     Protime 14.9 (H) Seconds      INR 1.19 (H)    Hemoglobin & Hematocrit, Blood [[id number]]  (Abnormal) Collected:  10/03/18 0322    Specimen:  Blood Updated:  10/03/18 0350     Hemoglobin 11.3 (L) g/dL      Hematocrit 36.1 %     Troponin [[id number]]  (Normal) Collected:  10/02/18 1400    Specimen:  Blood Updated:  10/02/18 1449     Troponin T <0.010 ng/mL     Narrative:       Troponin T Reference Ranges:  Less than 0.03 ng/mL:    Negative for AMI  0.03 to 0.09 ng/mL:      Indeterminant for AMI  Greater than 0.09 ng/mL: Positive for AMI          PHYSICAL EXAM:  Right knee dressing is dry and intact.  She does have a moderate amount of swelling to her right knee although her calf is soft and nontender.  Patient is complaining of some right groin pain which I think is due to position in the OR.  Patient is progressing slowly with physical therapy.  Hemoglobin is good at 11.3.  INR is 1.1.  Will increase her Coumadin to 5 mg daily and recheck her INR again in the morning.     ASSESSMENT & PLAN:    Have encouraged the patient to continue with PT to improve her range of motion and ambulation.  She is also complaining constipation and will give her some citrate of magnesia    Date: 10/3/2018    LEÓN Rooney MD      '

## 2018-10-03 NOTE — PLAN OF CARE
Problem: Patient Care Overview  Goal: Plan of Care Review  Outcome: Ongoing (interventions implemented as appropriate)   10/03/18 1053   Coping/Psychosocial   Plan of Care Reviewed With patient   OTHER   Outcome Summary Pt increasing with strength and ROM to improve with transfers and amb distance with RWX   Plan of Care Review   Progress improving

## 2018-10-03 NOTE — CONSULTS
Consults    Patient Care Team:  Maxine Batista MD as PCP - General (Internal Medicine)  Malu Patten MD as Consulting Physician (Cardiology)    Chief complaint:asked to see for chest pain    Subjective     History of Present Illness    67 yo female with a h/o RBBB and sarcoidosis and end stage OA of the right knee who comes to the  hospital for right knee replacement.  Patient underwent replaement 10/2.  We are asked to see he patient  Because of chest pain. The patient has a history of  RBBB and palpitations and uses cpap.  The patients chest pain occurred yesterday and lasted for a short peroid of time (about 15-20 minutes) and radiated to the neck, associated with mild SOA. She is no longer complaining of pain.  She was evaluated by cardiology in 2016.  She ended up with a pacemaker that was placed this year.    Review of Systems   Constitutional: Negative for activity change and appetite change.   HENT: Negative for dental problem, postnasal drip and rhinorrhea.    Respiratory: Positive for shortness of breath. Negative for apnea.    Cardiovascular: Positive for chest pain and palpitations.   Gastrointestinal: Negative for abdominal distention and abdominal pain.   Endocrine: Negative for cold intolerance and polydipsia.   Genitourinary: Negative for difficulty urinating and dysuria.   Musculoskeletal: Positive for arthralgias and joint swelling.   Neurological: Negative for dizziness and numbness.   Hematological: Negative for adenopathy.   Psychiatric/Behavioral: Negative for agitation and confusion.        Past Medical History:   Diagnosis Date   • Arthritis    • Bradycardia    • Cardiac disease    • GERD (gastroesophageal reflux disease)    • History of cellulitis    • History of kidney stones    • History of staph infection     IN FOOT AFTER SURGERY   • Hypertension    • Pacemaker     medtronic   • Right knee pain    • Sarcoidosis    • Sleep apnea     cpap   ,   Past Surgical History:   Procedure  Laterality Date   • APPENDECTOMY  1981   • CARDIAC CATHETERIZATION     • CHOLECYSTECTOMY  1998   • COLONOSCOPY     • ENDOSCOPY     • ESOPHAGEAL DILATATION  2015   • FOOT SURGERY Right 2012    3 screws   • HAND SURGERY Left 1993    thumb reconstruction    • HAND SURGERY Right 2007    thumb reconstruction   • HYSTERECTOMY  1981   • KIDNEY STONE SURGERY  2006   • KNEE SURGERY Right 2006    torn meniscus    • LUNG SURGERY Right 2001    lymph node biopsied (sarcoidosis)   • TONSILLECTOMY AND ADENOIDECTOMY  1955   • TOTAL KNEE ARTHROPLASTY Right 10/1/2018    Procedure: TOTAL KNEE ARTHROPLASTY;  Surgeon: Mathew Sloan MD;  Location: [facility] OR;  Service: Orthopedics   ,   Family History   Problem Relation Age of Onset   • Heart disease Mother    • Hypertension Mother    • Heart failure Mother    • Diabetes Mother    • Aortic aneurysm Father    • Cancer Father         skin, prostate, bladder   • Diabetes Sister    • Malig Hyperthermia Neg Hx    ,   Social History   Substance Use Topics   • Smoking status: Never Smoker   • Smokeless tobacco: Never Used   • Alcohol use No   ,   Prescriptions Prior to Admission   Medication Sig Dispense Refill Last Dose   • acetaminophen (TYLENOL) 500 MG tablet Take 500 mg by mouth As Needed for Mild Pain (1-3).   9/30/2018 at 1600   • CHLORHEXIDINE GLUCONATE CLOTH EX Apply  topically. AS DIRECTED PREOP   10/1/2018 at 0430   • cholecalciferol (VITAMIN D3) 1000 UNITS tablet Take 1,000 Units by mouth Daily.   9/24/2018   • hydrochlorothiazide (HYDRODIURIL) 25 MG tablet Take 25 mg by mouth Daily.   9/30/2018 at 0830   • losartan (COZAAR) 50 MG tablet Take 50 mg by mouth Daily.   10/1/2018 at 0430   • metoprolol succinate XL (TOPROL-XL) 25 MG 24 hr tablet Take 12.5 mg by mouth Daily.   10/1/2018 at 0430   • mupirocin (BACTROBAN) 2 % nasal ointment into the nostril(s) as directed by provider. AS DIRECTED PREOP   10/1/2018 at 0445   • omeprazole (priLOSEC) 20 MG capsule Take 20 mg by mouth  Every Morning.   10/1/2018 at 0430   , Scheduled Meds:    docusate sodium 100 mg Oral BID   hydrochlorothiazide 25 mg Oral Daily   losartan 50 mg Oral Daily   metoprolol succinate XL 12.5 mg Oral Daily   pantoprazole 40 mg Oral QAM   polyethylene glycol 17 g Oral BID   warfarin 5 mg Oral Daily   , Continuous Infusions:   , PRN Meds:  •  bisacodyl  •  HYDROcodone-acetaminophen  •  HYDROcodone-acetaminophen  •  ketorolac  •  melatonin  •  ondansetron **OR** ondansetron ODT **OR** ondansetron  •  oxyCODONE-acetaminophen  •  oxyCODONE-acetaminophen and Allergies:  Amitriptyline; Asa [aspirin]; Azithromycin; Clindamycin/lincomycin; Codeine; Dilaudid [hydromorphone]; Fluconazole; Gabapentin; Ibuprofen; Lubiprostone; Naproxen; Other; and Talwin [pentazocine]    Objective      Vital Signs  Temp:  [98.9 °F (37.2 °C)-99.5 °F (37.5 °C)] 99.1 °F (37.3 °C)  Heart Rate:  [62-77] 77  Resp:  [16] 16  BP: ()/(56-69) 141/65    Physical Exam   Constitutional: She appears well-developed and well-nourished.   HENT:   Head: Normocephalic and atraumatic.   Mouth/Throat: No oropharyngeal exudate.   Eyes: Pupils are equal, round, and reactive to light. EOM are normal.   Neck: Normal range of motion. Neck supple. No tracheal deviation present. No thyromegaly present.   Cardiovascular: Normal rate and regular rhythm.  Exam reveals no gallop and no friction rub.    No murmur heard.  Pulmonary/Chest: Effort normal and breath sounds normal. No respiratory distress. She has no wheezes.   Abdominal: Soft. She exhibits no distension. There is no tenderness.   Musculoskeletal: She exhibits edema. She exhibits no deformity.   Right knee is wrapped   Neurological: She is alert.   Skin: Skin is warm and dry. No erythema. No pallor.   Psychiatric: She has a normal mood and affect. Her behavior is normal.       Results Review:    I reviewed the patient's new clinical results.  I personally viewed and interpreted the patient's EKG/Telemetry data         Assessment/Plan     Principal Problem:    Primary osteoarthritis of right knee  Active Problems:    Chest pain    Constipation      Assessment & Plan      Primary osteoarthritis of right knee  -s/p surgery and doing well from this, pain is controlled      Chest pain  -repeat troponin and will ask cardiology to see      Constipation  -increase the miralax,  The patient has a h/o of a prolapsed rectum so can not use suppositories    Elevated LFTs-monitor      I discussed the patients findings and my recommendations with patient and nursing staff    Isaak Khalil MD  10/03/18  12:54 PM    Time:

## 2018-10-03 NOTE — THERAPY TREATMENT NOTE
Acute Care - Physical Therapy Treatment Note  [facility]     Patient Name: Chanell Tafoya  : 1950  MRN: [id number]  Today's Date: 10/3/2018  Onset of Illness/Injury or Date of Surgery: 10/01/18  Date of Referral to PT: 10/01/18  Referring Physician: Mathew Fontanez    Admit Date: 10/1/2018    Visit Dx:    ICD-10-CM ICD-9-CM   1. Difficulty walking R26.2 719.7   2. Primary osteoarthritis of right knee M17.11 715.16     Patient Active Problem List   Diagnosis   • Primary osteoarthritis of right knee       Therapy Treatment          Rehabilitation Treatment Summary     Row Name 10/03/18 1000             Treatment Time/Intention    Discipline physical therapy assistant  -CW      Document Type therapy note (daily note)  -CW      Subjective Information complains of;pain  -CW      Mode of Treatment physical therapy  -CW      Therapy Frequency (PT Clinical Impression) 2 times/day  -CW      Patient Effort good  -CW      Existing Precautions/Restrictions fall  -CW      Recorded by [CW] Rigo Wheat P, PTA 10/03/18 1052      Row Name 10/03/18 1000             Vital Signs    O2 Delivery Pre Treatment room air  -CW      Recorded by [CW] Rigo Wheat, PTA 10/03/18 1052      Row Name 10/03/18 1000             Cognitive Assessment/Intervention- PT/OT    Orientation Status (Cognition) oriented x 3  -CW      Follows Commands (Cognition) WNL  -CW      Personal Safety Interventions fall prevention program maintained;gait belt;muscle strengthening facilitated;nonskid shoes/slippers when out of bed  -CW      Recorded by [CW] Rigo Wheat, PTA 10/03/18 1052      Row Name 10/03/18 1000             Bed Mobility Assessment/Treatment    Comment (Bed Mobility) in chair  -CW      Recorded by [CW] Rigo Wheat, PTA 10/03/18 1052      Row Name 10/03/18 1000             Transfer Assessment/Treatment    Transfer Assessment/Treatment sit-stand transfer;stand-sit transfer  -CW      Recorded by [CW] Mary Alice  Rigo DE LA CRUZ, PTA 10/03/18 1052      Row Name 10/03/18 1000             Sit-Stand Transfer    Sit-Stand Miner (Transfers) supervision  -CW      Assistive Device (Sit-Stand Transfers) walker, front-wheeled  -CW      Recorded by [CW] Rigo Wheat, PTA 10/03/18 1052      Row Name 10/03/18 1000             Stand-Sit Transfer    Stand-Sit Miner (Transfers) supervision  -CW      Assistive Device (Stand-Sit Transfers) walker, front-wheeled  -CW      Recorded by [CW] Rigo Wheat, PTA 10/03/18 1052      Row Name 10/03/18 1000             Gait/Stairs Assessment/Training    Miner Level (Gait) supervision  -CW      Assistive Device (Gait) walker, front-wheeled  -CW      Distance in Feet (Gait) 80  -CW      Pattern (Gait) step-through  -CW      Deviations/Abnormal Patterns (Gait) antalgic;amber decreased  -CW      Miner Level (Stairs) supervision  -CW2      Handrail Location (Stairs) both sides  -CW2      Number of Steps (Stairs) 4  -CW2      Ascending Technique (Stairs) step-to-step  -CW2      Descending Technique (Stairs) step-to-step  -CW2      Recorded by [CW] Rigo Wheat, PTA 10/03/18 1052  [CW2] Rigo Wheat, PTA 10/03/18 1121      Row Name 10/03/18 1000             General ROM    GENERAL ROM COMMENTS 8-80  -CW      Recorded by [CW] Rigo Wheat, PTA 10/03/18 1121      Row Name 10/03/18 1000             Therapeutic Exercise    Comment (Therapeutic Exercise) R TKA Protocol 15 reps  -CW      Recorded by [CW] Rigo Wheat, PTA 10/03/18 1052      Row Name 10/03/18 1000             Positioning and Restraints    Pre-Treatment Position sitting in chair/recliner  -CW      Post Treatment Position chair  -CW      In Chair notified nsg;reclined;call light within reach;encouraged to call for assist  -CW      Recorded by [CW] Rigo Wheat, PTA 10/03/18 1052      Row Name 10/03/18 1000             Pain Assessment    Additional Documentation Pain Scale: Numbers  Pre/Post-Treatment (Group)  -CW      Recorded by [CW] Rigo Wheat, PTA 10/03/18 1052      Row Name 10/03/18 1000             Pain Scale: Numbers Pre/Post-Treatment    Pain Scale: Numbers, Pretreatment 5/10  -CW      Pain Scale: Numbers, Post-Treatment 5/10  -CW      Pain Location - Side Right  -CW      Pain Location knee  -CW      Pain Intervention(s) Repositioned;Ambulation/increased activity  -CW      Recorded by [CW] Rigo Wheat, PTA 10/03/18 1052      Row Name                Wound 10/01/18 0835 Right knee incision    Wound - Properties Group Date first assessed: 10/01/18 [SB] Time first assessed: 0835 [SB] Side: Right [SB] Location: knee [SB] Type: incision [SB] Recorded by:  [SB] Cherie Matthews RN 10/01/18 0835    Row Name 10/03/18 1000             Outcome Summary/Treatment Plan (PT)    Anticipated Discharge Disposition (PT) home with assist;home with home health  -CW      Recorded by [CW] Rigo Wheat, PTA 10/03/18 1052        User Key  (r) = Recorded By, (t) = Taken By, (c) = Cosigned By    Initials Name Effective Dates Discipline    SB Cherie Matthews RN 06/16/16 -  Nurse    CW Rigo Wheat, PTA 03/07/18 -  PT          Wound 10/01/18 0835 Right knee incision (Active)   Dressing Appearance dry;intact;no drainage 10/3/2018  8:03 AM   Closure JANETT 10/3/2018  8:03 AM   Base dressing in place, unable to visualize 10/3/2018  8:03 AM   Drainage Amount none 10/3/2018  8:03 AM   Dressing Care, Wound other (see comments) 10/2/2018  8:23 PM             Physical Therapy Education     Title: PT OT SLP Therapies (Resolved)     Topic: Physical Therapy (Resolved)     Point: Mobility training (Resolved)    Learning Progress Summary     Learner Status Readiness Method Response Comment Documented by    Patient Done Acceptance E,D VU,NR  MS 10/01/18 5492          Point: Home exercise program (Resolved)    Learning Progress Summary     Learner Status Readiness Method Response Comment  Documented by    Patient Done Acceptance NESSA FLEMING NR  MS 10/01/18 1148          Point: Body mechanics (Resolved)    Learning Progress Summary     Learner Status Readiness Method Response Comment Documented by    Patient Done Acceptance NESSA FLEMING NR  MS 10/01/18 1148          Point: Precautions (Resolved)    Learning Progress Summary     Learner Status Readiness Method Response Comment Documented by    Patient Done Acceptance NESSA FLEMING NR  MS 10/01/18 1148                      User Key     Initials Effective Dates Name Provider Type Discipline    MS 04/03/18 -  Alan Godinez, PT Physical Therapist PT                    PT Recommendation and Plan  Anticipated Discharge Disposition (PT): home with assist, home with home health  Therapy Frequency (PT Clinical Impression): 2 times/day  Outcome Summary/Treatment Plan (PT)  Anticipated Discharge Disposition (PT): home with assist, home with home health  Plan of Care Reviewed With: patient  Progress: improving  Outcome Summary: Pt increasing with strength and ROM to improve with transfers and amb distance with RWX          Outcome Measures     Row Name 10/03/18 1000 10/01/18 1100          How much help from another person do you currently need...    Turning from your back to your side while in flat bed without using bedrails? 4  -CW 4  -MS     Moving from lying on back to sitting on the side of a flat bed without bedrails? 4  -CW 4  -MS     Moving to and from a bed to a chair (including a wheelchair)? 3  -CW 3  -MS     Standing up from a chair using your arms (e.g., wheelchair, bedside chair)? 3  -CW 3  -MS     Climbing 3-5 steps with a railing? 3  -CW 3  -MS     To walk in hospital room? 3  -CW 3  -MS     AM-PAC 6 Clicks Score 20  -CW 20  -MS        Functional Assessment    Outcome Measure Options AM-PAC 6 Clicks Basic Mobility (PT)  -CW AM-PAC 6 Clicks Basic Mobility (PT)  -MS       User Key  (r) = Recorded By, (t) = Taken By, (c) = Cosigned By    Initials Name Provider Type     Alan Kevin, PT Physical Therapist    Rigo Hurst PTA Physical Therapy Assistant           Time Calculation:         PT Charges     Row Name 10/03/18 1121             Time Calculation    Start Time 1040  -CW      Stop Time 1121  -CW      Time Calculation (min) 41 min  -CW      PT Received On 10/03/18  -CW        User Key  (r) = Recorded By, (t) = Taken By, (c) = Cosigned By    Initials Name Provider Type    Rigo Hurst PTA Physical Therapy Assistant        Therapy Suggested Charges     Code   Minutes Charges    None           Therapy Charges for Today     Code Description Service Date Service Provider Modifiers Qty    15783276902 HC PT THER PROC GROUP 10/3/2018 Rigo Wheat PTA GP 1    53937779438 HC PT THER PROC EA 15 MIN 10/3/2018 Rigo Wheat PTA GP 1          PT G-Codes  PT Professional Judgement Used?: Yes  Outcome Measure Options: AM-PAC 6 Clicks Basic Mobility (PT)  AM-PAC 6 Clicks Score: 20  Functional Limitation: Mobility: Walking and moving around  Mobility: Walking and Moving Around Current Status (): At least 20 percent but less than 40 percent impaired, limited or restricted  Mobility: Walking and Moving Around Goal Status (): 0 percent impaired, limited or restricted    Rigo Wheat PTA  10/3/2018

## 2018-10-03 NOTE — PLAN OF CARE
Problem: Patient Care Overview  Goal: Plan of Care Review  Outcome: Ongoing (interventions implemented as appropriate)   10/03/18 6699   Coping/Psychosocial   Plan of Care Reviewed With patient   OTHER   Outcome Summary pt ambulating well with assist x 1 to bathroom. Complaints of pain but improvement with prescribed pain meds. VSS, A&Ox4. Awaiting discharge. Will continue to monitor patient closely.   Plan of Care Review   Progress improving

## 2018-10-04 ENCOUNTER — APPOINTMENT (OUTPATIENT)
Dept: ULTRASOUND IMAGING | Facility: HOSPITAL | Age: 68
End: 2018-10-04

## 2018-10-04 LAB
ALBUMIN SERPL-MCNC: 3.7 G/DL (ref 3.5–5.2)
ALBUMIN/GLOB SERPL: 1 G/DL
ALP SERPL-CCNC: 227 U/L (ref 39–117)
ALT SERPL W P-5'-P-CCNC: 230 U/L (ref 1–33)
ANION GAP SERPL CALCULATED.3IONS-SCNC: 12.2 MMOL/L
AST SERPL-CCNC: 370 U/L (ref 1–32)
BILIRUB SERPL-MCNC: 1.6 MG/DL (ref 0.1–1.2)
BUN BLD-MCNC: 10 MG/DL (ref 8–23)
BUN/CREAT SERPL: 14.5 (ref 7–25)
CALCIUM SPEC-SCNC: 9.3 MG/DL (ref 8.6–10.5)
CHLORIDE SERPL-SCNC: 98 MMOL/L (ref 98–107)
CO2 SERPL-SCNC: 27.8 MMOL/L (ref 22–29)
CREAT BLD-MCNC: 0.69 MG/DL (ref 0.57–1)
GFR SERPL CREATININE-BSD FRML MDRD: 84 ML/MIN/1.73
GLOBULIN UR ELPH-MCNC: 3.7 GM/DL
GLUCOSE BLD-MCNC: 128 MG/DL (ref 65–99)
HCT VFR BLD AUTO: 37.3 % (ref 35.6–45.5)
HGB BLD-MCNC: 11.7 G/DL (ref 11.9–15.5)
INR PPP: 1.16 (ref 0.9–1.1)
POTASSIUM BLD-SCNC: 3.8 MMOL/L (ref 3.5–5.2)
PROT SERPL-MCNC: 7.4 G/DL (ref 6–8.5)
PROTHROMBIN TIME: 14.6 SECONDS (ref 11.7–14.2)
SODIUM BLD-SCNC: 138 MMOL/L (ref 136–145)

## 2018-10-04 PROCEDURE — 99024 POSTOP FOLLOW-UP VISIT: CPT | Performed by: NURSE PRACTITIONER

## 2018-10-04 PROCEDURE — 80053 COMPREHEN METABOLIC PANEL: CPT | Performed by: HOSPITALIST

## 2018-10-04 PROCEDURE — 76705 ECHO EXAM OF ABDOMEN: CPT

## 2018-10-04 PROCEDURE — 99222 1ST HOSP IP/OBS MODERATE 55: CPT | Performed by: INTERNAL MEDICINE

## 2018-10-04 PROCEDURE — 85014 HEMATOCRIT: CPT | Performed by: NURSE PRACTITIONER

## 2018-10-04 PROCEDURE — 85018 HEMOGLOBIN: CPT | Performed by: NURSE PRACTITIONER

## 2018-10-04 PROCEDURE — 97110 THERAPEUTIC EXERCISES: CPT | Performed by: PHYSICAL THERAPIST

## 2018-10-04 PROCEDURE — 85610 PROTHROMBIN TIME: CPT | Performed by: ORTHOPAEDIC SURGERY

## 2018-10-04 PROCEDURE — 97150 GROUP THERAPEUTIC PROCEDURES: CPT | Performed by: PHYSICAL THERAPIST

## 2018-10-04 RX ORDER — OXYCODONE HYDROCHLORIDE 5 MG/1
10 TABLET ORAL EVERY 4 HOURS PRN
Status: DISCONTINUED | OUTPATIENT
Start: 2018-10-04 | End: 2018-10-06 | Stop reason: HOSPADM

## 2018-10-04 RX ADMIN — HYDROCODONE BITARTRATE AND ACETAMINOPHEN 2 TABLET: 7.5; 325 TABLET ORAL at 13:08

## 2018-10-04 RX ADMIN — POTASSIUM CHLORIDE 40 MEQ: 1.5 POWDER, FOR SOLUTION ORAL at 00:58

## 2018-10-04 RX ADMIN — OXYCODONE HYDROCHLORIDE 10 MG: 5 TABLET ORAL at 23:22

## 2018-10-04 RX ADMIN — METOPROLOL SUCCINATE 12.5 MG: 25 TABLET, FILM COATED, EXTENDED RELEASE ORAL at 21:37

## 2018-10-04 RX ADMIN — HYDROCODONE BITARTRATE AND ACETAMINOPHEN 2 TABLET: 7.5; 325 TABLET ORAL at 09:07

## 2018-10-04 RX ADMIN — PANTOPRAZOLE SODIUM 40 MG: 40 TABLET, DELAYED RELEASE ORAL at 04:09

## 2018-10-04 RX ADMIN — LOSARTAN POTASSIUM 50 MG: 50 TABLET, FILM COATED ORAL at 09:07

## 2018-10-04 RX ADMIN — HYDROCODONE BITARTRATE AND ACETAMINOPHEN 2 TABLET: 7.5; 325 TABLET ORAL at 04:09

## 2018-10-04 RX ADMIN — OXYCODONE HYDROCHLORIDE 10 MG: 5 TABLET ORAL at 18:17

## 2018-10-04 RX ADMIN — WARFARIN SODIUM 5 MG: 5 TABLET ORAL at 18:17

## 2018-10-04 NOTE — DISCHARGE SUMMARY
"Patient Name: Chanell Tafoya  Patient YOB: 1950    Date of Admission:  10/1/2018  Date of Discharge:  10/4/2018  Discharge Diagnosis: TOTAL KNEE ARTHROPLASTY  Presenting Problem/History of Present Illness: Primary osteoarthritis of right knee [M17.11]  Primary osteoarthritis of right knee [M17.11]  Difficulty walking [R26.2]  Admitting Physician: Dr Mathew Sloan  Consults:   Consults     Date and Time Order Name Status Description    10/3/2018 1955 Inpatient Cardiology Consult Completed     10/3/2018 1159 Inpatient Internal Medicine Consult            DETAILS OF HOSPITAL STAY:  Patient is a 68 y.o. female was admitted to the floor following the above procedure.  On postop day #1 the patient had complaints of chest pain, was seen by [facility] as well as cardiology.  After monitoring and workup, she has been cleared to be discharged home.  Patient's nurse will verify that both [facility] and cardiology are okay with the patient going home.  She has had no further problems with chest pain or shortness of breath\" is feeling great and ready to go home\" Patient did well with physical therapy and was ambulating well without problems.  On the day of discharge the wound was clean, dry and intact and calf was soft and non tender and Homans sign was negative.  Patient was tolerating  without problems.  Patient will be discharged home.    Condition on Discharge:  Stable    Vital Signs  Temp:  [98.2 °F (36.8 °C)-100 °F (37.8 °C)] 98.7 °F (37.1 °C)  Heart Rate:  [63-77] 66  Resp:  [16-18] 16  BP: (106-141)/(63-70) 106/63    LABS:      Admission on 10/01/2018   Component Date Value Ref Range Status   • Hemoglobin 10/02/2018 12.3  11.9 - 15.5 g/dL Final   • Hematocrit 10/02/2018 39.3  35.6 - 45.5 % Final   • Protime 10/02/2018 13.4  11.7 - 14.2 Seconds Final   • INR 10/02/2018 1.04  0.90 - 1.10 Final   • Protime 10/02/2018 13.8  11.7 - 14.2 Seconds Final   • INR 10/02/2018 1.08  0.90 - 1.10 Final   • Troponin T 10/02/2018 <0.010 "  0.000 - 0.030 ng/mL Final   • Hemoglobin 10/03/2018 11.3* 11.9 - 15.5 g/dL Final   • Hematocrit 10/03/2018 36.1  35.6 - 45.5 % Final   • Protime 10/03/2018 14.9* 11.7 - 14.2 Seconds Final   • INR 10/03/2018 1.19* 0.90 - 1.10 Final   • Troponin T 10/03/2018 <0.010  0.000 - 0.030 ng/mL Final   • Glucose 10/03/2018 123* 65 - 99 mg/dL Final   • BUN 10/03/2018 11  8 - 23 mg/dL Final   • Creatinine 10/03/2018 0.76  0.57 - 1.00 mg/dL Final   • Sodium 10/03/2018 139  136 - 145 mmol/L Final   • Potassium 10/03/2018 3.2* 3.5 - 5.2 mmol/L Final   • Chloride 10/03/2018 99  98 - 107 mmol/L Final   • CO2 10/03/2018 27.7  22.0 - 29.0 mmol/L Final   • Calcium 10/03/2018 9.3  8.6 - 10.5 mg/dL Final   • Total Protein 10/03/2018 7.4  6.0 - 8.5 g/dL Final   • Albumin 10/03/2018 3.80  3.50 - 5.20 g/dL Final   • ALT (SGPT) 10/03/2018 118* 1 - 33 U/L Final   • AST (SGOT) 10/03/2018 103* 1 - 32 U/L Final   • Alkaline Phosphatase 10/03/2018 105  39 - 117 U/L Final   • Total Bilirubin 10/03/2018 1.3* 0.1 - 1.2 mg/dL Final   • eGFR Non African Amer 10/03/2018 75  >60 mL/min/1.73 Final   • Globulin 10/03/2018 3.6  gm/dL Final   • A/G Ratio 10/03/2018 1.1  g/dL Final   • BUN/Creatinine Ratio 10/03/2018 14.5  7.0 - 25.0 Final   • Anion Gap 10/03/2018 12.3  mmol/L Final   • WBC 10/03/2018 11.38* 4.50 - 10.70 10*3/mm3 Final   • RBC 10/03/2018 3.99  3.90 - 5.20 10*6/mm3 Final   • Hemoglobin 10/03/2018 11.9  11.9 - 15.5 g/dL Final   • Hematocrit 10/03/2018 38.1  35.6 - 45.5 % Final   • MCV 10/03/2018 95.5  80.5 - 98.2 fL Final   • MCH 10/03/2018 29.8  26.9 - 32.0 pg Final   • MCHC 10/03/2018 31.2* 32.4 - 36.3 g/dL Final   • RDW 10/03/2018 12.9  11.7 - 13.0 % Final   • RDW-SD 10/03/2018 45.0  37.0 - 54.0 fl Final   • MPV 10/03/2018 10.0  6.0 - 12.0 fL Final   • Platelets 10/03/2018 288  140 - 500 10*3/mm3 Final   • Neutrophil % 10/03/2018 74.6  42.7 - 76.0 % Final   • Lymphocyte % 10/03/2018 13.7* 19.6 - 45.3 % Final   • Monocyte % 10/03/2018 10.5   5.0 - 12.0 % Final   • Eosinophil % 10/03/2018 0.4  0.3 - 6.2 % Final   • Basophil % 10/03/2018 0.4  0.0 - 1.5 % Final   • Immature Grans % 10/03/2018 0.4  0.0 - 0.5 % Final   • Neutrophils, Absolute 10/03/2018 8.49* 1.90 - 8.10 10*3/mm3 Final   • Lymphocytes, Absolute 10/03/2018 1.56  0.90 - 4.80 10*3/mm3 Final   • Monocytes, Absolute 10/03/2018 1.20  0.20 - 1.20 10*3/mm3 Final   • Eosinophils, Absolute 10/03/2018 0.05  0.00 - 0.70 10*3/mm3 Final   • Basophils, Absolute 10/03/2018 0.04  0.00 - 0.20 10*3/mm3 Final   • Immature Grans, Absolute 10/03/2018 0.04* 0.00 - 0.03 10*3/mm3 Final   • Hemoglobin 10/04/2018 11.7* 11.9 - 15.5 g/dL Final   • Hematocrit 10/04/2018 37.3  35.6 - 45.5 % Final   • Protime 10/04/2018 14.6* 11.7 - 14.2 Seconds Final   • INR 10/04/2018 1.16* 0.90 - 1.10 Final   • Glucose 10/04/2018 128* 65 - 99 mg/dL Final   • BUN 10/04/2018 10  8 - 23 mg/dL Final   • Creatinine 10/04/2018 0.69  0.57 - 1.00 mg/dL Final   • Sodium 10/04/2018 138  136 - 145 mmol/L Final   • Potassium 10/04/2018 3.8  3.5 - 5.2 mmol/L Final   • Chloride 10/04/2018 98  98 - 107 mmol/L Final   • CO2 10/04/2018 27.8  22.0 - 29.0 mmol/L Final   • Calcium 10/04/2018 9.3  8.6 - 10.5 mg/dL Final   • Total Protein 10/04/2018 7.4  6.0 - 8.5 g/dL Final   • Albumin 10/04/2018 3.70  3.50 - 5.20 g/dL Final   • ALT (SGPT) 10/04/2018 230* 1 - 33 U/L Final   • AST (SGOT) 10/04/2018 370* 1 - 32 U/L Final   • Alkaline Phosphatase 10/04/2018 227* 39 - 117 U/L Final   • Total Bilirubin 10/04/2018 1.6* 0.1 - 1.2 mg/dL Final   • eGFR Non African Amer 10/04/2018 84  >60 mL/min/1.73 Final   • Globulin 10/04/2018 3.7  gm/dL Final   • A/G Ratio 10/04/2018 1.0  g/dL Final   • BUN/Creatinine Ratio 10/04/2018 14.5  7.0 - 25.0 Final   • Anion Gap 10/04/2018 12.2  mmol/L Final       No results found.    Discharge Medications     Discharge Medications      New Medications      Instructions Start Date   docusate sodium 100 MG capsule   100 mg, Oral, 2 Times  Daily      HYDROcodone-acetaminophen 7.5-325 MG per tablet  Commonly known as:  NORCO   1-2 po q4-6 hr prn pain      ondansetron 4 MG tablet  Commonly known as:  ZOFRAN   4 mg, Oral, Every 6 Hours PRN      polyethylene glycol pack packet  Commonly known as:  MIRALAX   17 g, Oral, 2 Times Daily      warfarin 4 MG tablet  Commonly known as:  COUMADIN   Take as directed         Continue These Medications      Instructions Start Date   cholecalciferol 1000 units tablet  Commonly known as:  VITAMIN D3   1,000 Units, Oral, Daily      hydrochlorothiazide 25 MG tablet  Commonly known as:  HYDRODIURIL   25 mg, Oral, Daily      losartan 50 MG tablet  Commonly known as:  COZAAR   50 mg, Oral, Daily      metoprolol succinate XL 25 MG 24 hr tablet  Commonly known as:  TOPROL-XL   12.5 mg, Oral, Daily      omeprazole 20 MG capsule  Commonly known as:  priLOSEC   20 mg, Oral, Every Morning         Stop These Medications    acetaminophen 500 MG tablet  Commonly known as:  TYLENOL     CHLORHEXIDINE GLUCONATE CLOTH EX     mupirocin 2 % nasal ointment  Commonly known as:  BACTROBAN            Activity at Discharge:     Discharge Instructions: Patient is to continue with physical therapy exercises daily and continue working with the physical therapist as ordered. Patient may weight bear as tolerated. Apply ice regularly. Patient may ice for long periods of time as long as ice is not directly on the skin. Patient instructed on frequent calf pumping exercises.  Patient also instructed on incentive spirometer during hospitalization and encouraged to continue to use at home regularly.    *The dressing should be left in place until the suction unit stops functioning (typically 7 days.  If waterproof dressing is intact the patient may shower immediately following discharge. If the dressing becomes disloged or saturated it should be changed. Please refer to the BRANDON information sheet if you have any questions about the dressing.  If you have  a home health nurse or therapist they can be contacted to assist with dressing change or repair. You may also call the BRANDON dressing hotline for questions related to the dressing ([phone]). If there still other problems or questions related to the dressing despite these measures then you can contact Amada at our office [phone].  After 1 week when the BRANDON dressing is removed, the wound should be gently cleaned with antibacterial soap then allowed to dry and covered with a dry sterile dressing. The wound should be covered at all times except while showering.  Patient may change dressings daily and prn using sterile 4x4 and paper tape, and should call if any unusual drainage, redness or swelling.*  Follow up appointment in 2 weeks - patient to call the office at [phone] to schedule.  Patient will be discharged on coumadin as directed to keep INR 2-3.  Please draw PT / INR in 2 days and call to office during business hours at [phone].    Discharge Diagnosis:    Patient Active Problem List   Diagnosis   • Primary osteoarthritis of right knee   • Chest pain   • Constipation   • Difficulty walking       Follow-up Appointments  Future Appointments  Date Time Provider Department Center   10/16/2018 10:40 AM Mathew Sloan MD [facility] None     Additional Instructions for the Follow-ups that You Need to Schedule     Referral to home health    As directed      PT to see for Home PT protocol. Daily for first week then 3x/ wk for second week. Staples to be removed at f/u appointment in 2 weeks.    Order Comments:  PT to see for Home PT protocol. Daily for first week then 3x/ wk for second week. Staples to be removed at f/u appointment in 2 weeks.     Face to Face Visit Date:  10/2/2018    Follow-up Provider for Plan of Care?:  I will be treating the patient on an ongoing basis.  Please send me the Plan of Care for signature.    Follow-up Provider:  MATHEW SLOAN [[id number]]    Reason/Clinical Findings:  post op knee  replacement    Describe mobility limitations that make leaving home difficult:  pain, swelling, weakness, limited mobility, difficulty ambulating without assistive device    Nursing/Therapeutic Services Requested:  Skilled Nursing Physicial Therapy    Skilled nursing orders:  Other (draw PT/INR in 2 days and call result to [phone])                  Verona Mckeon, LIA  10/04/18  9:28 AM

## 2018-10-04 NOTE — CONSULTS
[facility]  Initial Inpatient Consult Note    Referring Provider: NISHA    Reason for Consultation: elevated LFTs    Subjective     History of present illness:    68 y.o. female , not previously known to our service, whom we are asked to see for elevated LFTs.  She is postop day #2, status post right total knee replacement for osteoarthritis.    Her LFTs were noted to be elevated today with an ALT of 230, AST is 370, alkaline phosphatase of 227 and total bilirubin level of 1.6.  These have increased from yesterday.  No other labs in the system to compare to.  She does have some labs from [facility] in 2016 which showed normal LFTs at that time.  She received 3 doses of Ancef on 10/1.  She has been receiving pain medication with acetaminophen regularly.  She has been taking 2 tablets of 7.5/325 Norco 4 times a day.  She has received 4 doses a day from October 1 to October 3 and she received 3 doses today which equates to approximately 3 g of Tylenol daily.  Tylenol was also listed as a home medication, however patient reports she was on taking once daily.  She has had prior cholecystectomy.    No prior history of liver dysfunction or abnormal liver enzymes per the patient.  She denies any history of alcohol use.  No family history of liver disease.  She did have significant pain the day following surgery and reports she did not eat well that day.  She also required regular pain medication that day.  She is feeling much better.  Pain is tolerable.  She has no nausea or vomiting.      She reports a history of chronic diarrhea since her gallbladder was removed.  This is typically postprandial and sometimes associated with urgency but for the most part tolerable.  Since her surgery she has had some constipation.  His did not improve initially with Colace and MiraLAX so she drink back citrate yesterday and had multiple bowel movements starting overnight which have tapered off throughout the day  today.    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   • GERD (gastroesophageal reflux disease)    • History of cellulitis    • History of kidney stones    • History of staph infection     IN FOOT AFTER SURGERY   • Hypertension    • Nonocclusive coronary atherosclerosis of native coronary artery    • Osteoarthritis    • Sarcoidosis    • Sleep apnea     cpap     Past Surgical History:  Past Surgical History:   Procedure Laterality Date   • APPENDECTOMY  1981   • CARDIAC CATHETERIZATION     • CHOLECYSTECTOMY  1998   • COLONOSCOPY     • ENDOSCOPY     • ESOPHAGEAL DILATATION  2015   • FOOT SURGERY Right 2012    3 screws   • HAND SURGERY Left 1993    thumb reconstruction    • HAND SURGERY Right 2007    thumb reconstruction   • HYSTERECTOMY  1981   • KIDNEY STONE SURGERY  2006   • KNEE SURGERY Right 2006    torn meniscus    • LUNG SURGERY Right 2001    lymph node biopsied (sarcoidosis)   • TONSILLECTOMY AND ADENOIDECTOMY  1955   • TOTAL KNEE ARTHROPLASTY Right 10/1/2018    Procedure: TOTAL KNEE ARTHROPLASTY;  Surgeon: Mathew Sloan MD;  Location: [facility];  Service: Orthopedics      Social History:   Social History   Substance Use Topics   • Smoking status: Never Smoker   • Smokeless tobacco: Never Used   • Alcohol use No      Family History:  Family History   Problem Relation Age of Onset   • Heart disease Mother    • Hypertension Mother    • Heart failure Mother    • Diabetes Mother    • Aortic aneurysm Father    • Cancer Father         skin, prostate, bladder   • Diabetes Sister    • Malig Hyperthermia Neg Hx        Home Meds:  Prescriptions Prior to Admission   Medication Sig Dispense Refill Last Dose   • acetaminophen (TYLENOL) 500 MG tablet Take 500 mg by mouth As Needed for Mild Pain (1-3).   9/30/2018 at 1600   • CHLORHEXIDINE GLUCONATE CLOTH EX Apply  topically. AS DIRECTED PREOP   10/1/2018 at 0430   • cholecalciferol (VITAMIN D3) 1000  UNITS tablet Take 1,000 Units by mouth Daily.   9/24/2018   • hydrochlorothiazide (HYDRODIURIL) 25 MG tablet Take 25 mg by mouth Daily.   9/30/2018 at 0830   • losartan (COZAAR) 50 MG tablet Take 50 mg by mouth Daily.   10/1/2018 at 0430   • metoprolol succinate XL (TOPROL-XL) 25 MG 24 hr tablet Take 12.5 mg by mouth Daily.   10/1/2018 at 0430   • mupirocin (BACTROBAN) 2 % nasal ointment into the nostril(s) as directed by provider. AS DIRECTED PREOP   10/1/2018 at 0445   • omeprazole (priLOSEC) 20 MG capsule Take 20 mg by mouth Every Morning.   10/1/2018 at 0430     Current Meds:     docusate sodium 100 mg Oral BID   hydrochlorothiazide 25 mg Oral Daily   losartan 50 mg Oral Daily   metoprolol succinate XL 12.5 mg Oral Daily   pantoprazole 40 mg Oral QAM   polyethylene glycol 17 g Oral TID   warfarin 5 mg Oral Daily     Allergies:  Allergies   Allergen Reactions   • Amitriptyline Shortness Of Breath     Anaphylactic episode   • Asa [Aspirin] Other (See Comments)     Severe abdominal pain   • Azithromycin Other (See Comments)   • Clindamycin/Lincomycin Other (See Comments)     Blisters and erosions in esophagus & stomach   • Codeine Other (See Comments)     In large amounts causes headaches   • Dilaudid [Hydromorphone]      Causes chest pains/breathing problems when given by IV   • Fluconazole Other (See Comments)   • Gabapentin Diarrhea   • Ibuprofen Swelling     Swelling of hands and legs   • Lubiprostone Other (See Comments)   • Naproxen      Blisters in mouth/stomach   • Other      Surgical tape causes blisters     • Talwin [Pentazocine] Itching     Review of Systems  Pertinent items are noted in HPI, all other systems reviewed and negative     Objective     Vital Signs  Temp:  [98.7 °F (37.1 °C)-100 °F (37.8 °C)] 99.5 °F (37.5 °C)  Heart Rate:  [66-75] 74  Resp:  [16-18] 16  BP: (106-124)/(63-70) 108/65  Physical Exam:  General Appearance:    Alert, cooperative, in no acute distress   Head:    Normocephalic,  without obvious abnormality, atraumatic   Eyes:            Lids and lashes normal, conjunctivae and sclerae normal, no   icterus   Throat:   oral mucosa moist       Lungs:     Clear to auscultation,respirations regular, even and                   unlabored    Heart:    Regular rhythm and normal rate, normal S1 and S2, no            Murmur    Chest Wall:    No abnormalities observed   Abdomen:     Normal bowel sounds, no masses, no organomegaly, soft        non-tender, non-distended, no guarding, no rebound                 tenderness   Rectal:     Deferred   Extremities:   Trace lower extremity edema bilaterally    Skin:   No rash       Psychiatric:  Judgement and insight: normal   Orientation to person place and time: normal   Mood and affect: normal   Results Review:   I reviewed the patient's new clinical results.      Results from last 7 days  Lab Units 10/04/18  0458 10/03/18  1409 10/03/18  0322   WBC 10*3/mm3  --  11.38*  --    HEMOGLOBIN g/dL 11.7* 11.9 11.3*   HEMATOCRIT % 37.3 38.1 36.1   PLATELETS 10*3/mm3  --  288  --        Results from last 7 days  Lab Units 10/04/18  0458 10/03/18  1409   SODIUM mmol/L 138 139   POTASSIUM mmol/L 3.8 3.2*   CHLORIDE mmol/L 98 99   CO2 mmol/L 27.8 27.7   BUN mg/dL 10 11   CREATININE mg/dL 0.69 0.76   CALCIUM mg/dL 9.3 9.3   BILIRUBIN mg/dL 1.6* 1.3*   ALK PHOS U/L 227* 105   ALT (SGPT) U/L 230* 118*   AST (SGOT) U/L 370* 103*   GLUCOSE mg/dL 128* 123*       Results from last 7 days  Lab Units 10/04/18  0458 10/03/18  0322 10/02/18  0943   INR  1.16* 1.19* 1.08     No results found for: LIPASE    Radiology:  XR Knee 1 or 2 View Right   Final Result          Assessment/Plan   Patient Active Problem List   Diagnosis   • Primary osteoarthritis of right knee   • Chest pain   • Constipation   • Difficulty walking     Assessment-  1.  Elevated LFTs  2.  Status post total knee replacement  3.  Opioid-induced constipation    Plan-  - Agree with Dr. Khalil that the elevations may  be related to Tylenol use.  Cefazolin may also have contributed although this typically causes isolated transaminitis.  Recommend checking liver ultrasound, follow LFT trend.  Check viral hepatitis panel.    - Agree with discontinuing Tylenol  - Resume daily MiraLAX tomorrow while she is on narcotic pain medication  - Samm for the consult, we will follow her along with you      I discussed the patients findings and my recommendations with patient and family.    Radha Miles MD

## 2018-10-04 NOTE — PLAN OF CARE
Problem: Patient Care Overview  Goal: Plan of Care Review   10/04/18 1618   OTHER   Outcome Summary Tolerated increased reps. Reports increased fatogue this afternoon while walking. Will continue to progress as tolerated.

## 2018-10-04 NOTE — PROGRESS NOTES
[facility]     LOS: 1 day     Subjective:  No chest pain  Eating normally  No soa  No v/n  Had a bowel movement    Objective:    Vital Signs:  Temp:  [98.2 °F (36.8 °C)-100 °F (37.8 °C)] 99.3 °F (37.4 °C)  Heart Rate:  [63-75] 75  Resp:  [16-18] 16  BP: (106-124)/(63-70) 111/63    SpO2:  [92 %-98 %] 94 %  on   ;   Device (Oxygen Therapy): room air  Body mass index is 36.93 kg/m².    Physical Exam   Constitutional: She appears well-developed and well-nourished.   HENT:   Head: Normocephalic and atraumatic.   Cardiovascular: Normal rate and regular rhythm.    No murmur heard.  Pulmonary/Chest: Effort normal and breath sounds normal.   Abdominal: Soft. Bowel sounds are normal. She exhibits no distension. There is no tenderness.   Neurological: She is alert.   Skin: Skin is warm and dry.       Results Review:    Glucose   Date Value Ref Range Status   10/04/2018 128 (H) 65 - 99 mg/dL Final   10/03/2018 123 (H) 65 - 99 mg/dL Final       Results from last 7 days  Lab Units 10/04/18  0458 10/03/18  1409   WBC 10*3/mm3  --  11.38*   HEMOGLOBIN g/dL 11.7* 11.9   HEMATOCRIT % 37.3 38.1   PLATELETS 10*3/mm3  --  288       Results from last 7 days  Lab Units 10/04/18  0458   SODIUM mmol/L 138   POTASSIUM mmol/L 3.8   CHLORIDE mmol/L 98   CO2 mmol/L 27.8   BUN mg/dL 10   CREATININE mg/dL 0.69   CALCIUM mg/dL 9.3   BILIRUBIN mg/dL 1.6*   ALK PHOS U/L 227*   ALT (SGPT) U/L 230*   AST (SGOT) U/L 370*   GLUCOSE mg/dL 128*       Results from last 7 days  Lab Units 10/04/18  0458   INR  1.16*           Results from last 7 days  Lab Units 10/03/18  1409 10/02/18  1400   TROPONIN T ng/mL <0.010 <0.010     Cultures:       I have reviewed daily medications and changes in CPOE    Scheduled meds    docusate sodium 100 mg Oral BID   hydrochlorothiazide 25 mg Oral Daily   losartan 50 mg Oral Daily   metoprolol succinate XL 12.5 mg Oral Daily   pantoprazole 40 mg Oral QAM   polyethylene glycol 17 g Oral TID    warfarin 5 mg Oral Daily          PRN meds  •  bisacodyl  •  ketorolac  •  melatonin  •  ondansetron **OR** ondansetron ODT **OR** ondansetron  •  oxyCODONE      Principal Problem:    Primary osteoarthritis of right knee  Active Problems:    Chest pain    Constipation    Difficulty walking        Assessment/Plan:    Elevated LFTs  -likely from tylenol, hold on tylenol, will use oxycodone, recheck in am  -GI consult      Primary osteoarthritis of right knee  -doing well from this standpoint      Chest pain  -none overnight      Constipation  -now having bowel movement      Difficulty walking    Plan:  lfts are up  Gi consult  Likely d/c tomorrow if lfts are better    Isaak Khalil MD  10/04/18  2:27 PM

## 2018-10-04 NOTE — PLAN OF CARE
Problem: Patient Care Overview  Goal: Plan of Care Review   10/04/18 1256   OTHER   Outcome Summary Progressing well with ROM, HEP and ambualtion. Pt is safe to d/c home when medically stable.

## 2018-10-04 NOTE — CONSULTS
Date of Hospital Visit: 10/04/18  Encounter Provider: Cydney Goyal RN  Place of Service: [facility] CARDIOLOGY  Patient Name: Chanell Tafoya  :1950  Referral Provider: Mathew Sloan MD    Chief complaint: right knee osteoarthritis    Consulted for: chest pain    History of Present Illness: Ms. Tafoya is a 67 yo woman who follows with Dr. Malu Patten.  She has symptomatic bradycardia and underwent dual chamber MDT PPM placement in 2018.  She has had chest pain in the past; she has an essentially normal cath in .  She had a normal perfusion stress in .  She had an echo in 2018 that was normal.      She was admitted three days ago for an elective knee replacement.  Prior to surgery, she was not having any cardiac complaints of chest pain, dyspnea, edema, syncope, or orthopnea.  She has a history of intermittent palpitations but these are stable and mild.    Two days ago, she was having uncontrolled pain in the right leg/knee. She was also very constipated. During that period, she had pain in the right sternal area and chin that was burning.  It was not associated with dyspnea, diaphoresis, or nausea.  It resolved on its own without intervention after ~ 10 minutes.   She's not had it since.  Troponin was normal x 2.     Cardiac Testing:  Cardiac catheterization       Echocardiogram         Past Medical History:   Diagnosis Date   • Arthritis    • Bradycardia    • Cardiac disease    • GERD (gastroesophageal reflux disease)    • History of cellulitis    • History of kidney stones    • History of staph infection     IN FOOT AFTER SURGERY   • Hypertension    • Pacemaker     medtronic   • Right knee pain    • Sarcoidosis    • Sleep apnea     cpap       Past Surgical History:   Procedure Laterality Date   • APPENDECTOMY     • CARDIAC CATHETERIZATION     • CHOLECYSTECTOMY     • COLONOSCOPY     • ENDOSCOPY     • ESOPHAGEAL DILATATION     •  FOOT SURGERY Right 2012    3 screws   • HAND SURGERY Left 1993    thumb reconstruction    • HAND SURGERY Right 2007    thumb reconstruction   • HYSTERECTOMY  1981   • KIDNEY STONE SURGERY  2006   • KNEE SURGERY Right 2006    torn meniscus    • LUNG SURGERY Right 2001    lymph node biopsied (sarcoidosis)   • TONSILLECTOMY AND ADENOIDECTOMY  1955   • TOTAL KNEE ARTHROPLASTY Right 10/1/2018    Procedure: TOTAL KNEE ARTHROPLASTY;  Surgeon: Mathew Sloan MD;  Location: [facility] OR;  Service: Orthopedics       Prior to Admission medications    Medication Sig Start Date End Date Taking? Authorizing Provider   acetaminophen (TYLENOL) 500 MG tablet Take 500 mg by mouth As Needed for Mild Pain (1-3).   Yes ProviderYodit MD   CHLORHEXIDINE GLUCONATE CLOTH EX Apply  topically. AS DIRECTED PREOP   Yes Yodit Kellogg MD   cholecalciferol (VITAMIN D3) 1000 UNITS tablet Take 1,000 Units by mouth Daily.   Yes Yodit Kellogg MD   hydrochlorothiazide (HYDRODIURIL) 25 MG tablet Take 25 mg by mouth Daily.   Yes ProviderYodit MD   losartan (COZAAR) 50 MG tablet Take 50 mg by mouth Daily.   Yes Yodit Kellogg MD   metoprolol succinate XL (TOPROL-XL) 25 MG 24 hr tablet Take 12.5 mg by mouth Daily.   Yes ProviderYodit MD   mupirocin (BACTROBAN) 2 % nasal ointment into the nostril(s) as directed by provider. AS DIRECTED PREOP   Yes Yodit Kellogg MD   omeprazole (priLOSEC) 20 MG capsule Take 20 mg by mouth Every Morning.   Yes ProviderYodit MD   docusate sodium 100 MG capsule Take 100 mg by mouth 2 (Two) Times a Day for 26 doses. 10/2/18 10/15/18  Mathew Sloan MD   HYDROcodone-acetaminophen (NORCO) 7.5-325 MG per tablet 1-2 po q4-6 hr prn pain 10/2/18   Mathew Sloan MD   ondansetron (ZOFRAN) 4 MG tablet Take 1 tablet by mouth Every 6 (Six) Hours As Needed for Nausea or Vomiting for up to 10 doses. 10/2/18   Mathew Sloan MD   polyethylene glycol (MIRALAX) pack packet Take  "17 g by mouth 2 (Two) Times a Day for 12 doses. 10/2/18 10/8/18  Mathew Sloan MD   warfarin (COUMADIN) 4 MG tablet Take as directed 10/2/18   Mathew Sloan MD       Social History     Social History   • Marital status:      Spouse name: N/A   • Number of children: N/A   • Years of education: N/A     Occupational History   • Not on file.     Social History Main Topics   • Smoking status: Never Smoker   • Smokeless tobacco: Never Used   • Alcohol use No   • Drug use: No   • Sexual activity: Defer     Other Topics Concern   • Not on file     Social History Narrative   • No narrative on file       Family History   Problem Relation Age of Onset   • Heart disease Mother    • Hypertension Mother    • Heart failure Mother    • Diabetes Mother    • Aortic aneurysm Father    • Cancer Father         skin, prostate, bladder   • Diabetes Sister    • Malig Hyperthermia Neg Hx        Review of Systems   Constitutional: Positive for fatigue.   Cardiovascular: Positive for chest pain.   Musculoskeletal: Positive for arthralgias and joint swelling.   All other systems reviewed and are negative.       Objective:     Vitals:    10/03/18 1533 10/03/18 1900 10/03/18 2345 10/04/18 0423   BP: 119/66 121/70 124/65 111/66   BP Location: Left arm Left arm Left arm Left arm   Patient Position: Lying Lying Lying Lying   Pulse: 63 70 72 67   Resp: 16 16 18 16   Temp: 98.2 °F (36.8 °C) 100 °F (37.8 °C) 99.7 °F (37.6 °C) 99.2 °F (37.3 °C)   TempSrc: Oral  Oral Oral   SpO2: 94% 96% 92% 94%   Weight:       Height:         Body mass index is 36.93 kg/m².  Flowsheet Rows      First Filed Value   Admission Height  157.5 cm (62.01\") Documented at 10/01/2018 0607   Admission Weight  91.6 kg (202 lb) Documented at 10/01/2018 0607          Physical Exam   Constitutional: She is oriented to person, place, and time. She appears well-developed and well-nourished.   HENT:   Head: Normocephalic.   Nose: Nose normal.   Mouth/Throat: Oropharynx is clear " and moist.   Eyes: Pupils are equal, round, and reactive to light. Conjunctivae and EOM are normal.   Neck: Normal range of motion. No JVD present.   Cardiovascular: Normal rate, regular rhythm, normal heart sounds and intact distal pulses.    Pulmonary/Chest: Effort normal and breath sounds normal.   Abdominal: Soft. She exhibits no mass. There is no tenderness.   Musculoskeletal: Normal range of motion. She exhibits no edema (RLE diffusely swollen but not red/warm).   Neurological: She is alert and oriented to person, place, and time. No cranial nerve deficit.   Skin: Skin is warm and dry. No erythema.   Psychiatric: She has a normal mood and affect. Her behavior is normal. Judgment and thought content normal.   Vitals reviewed.              Lab Review:                  Results from last 7 days  Lab Units 10/04/18  0458   SODIUM mmol/L 138   POTASSIUM mmol/L 3.8   CHLORIDE mmol/L 98   CO2 mmol/L 27.8   BUN mg/dL 10   CREATININE mg/dL 0.69   GLUCOSE mg/dL 128*   CALCIUM mg/dL 9.3       Results from last 7 days  Lab Units 10/03/18  1409 10/02/18  1400   TROPONIN T ng/mL <0.010 <0.010       Results from last 7 days  Lab Units 10/04/18  0458 10/03/18  1409   WBC 10*3/mm3  --  11.38*   HEMOGLOBIN g/dL 11.7* 11.9   HEMATOCRIT % 37.3 38.1   PLATELETS 10*3/mm3  --  288       Results from last 7 days  Lab Units 10/04/18  0458 10/03/18  0322 10/02/18  0943   INR  1.16* 1.19* 1.08                       I personally viewed and interpreted the patient's EKG/Telemetry data -- AV paced, no change    Assessment/Plan:     1.  Chest pain -- somewhat atypical, in the setting of constipation and uncontrolled right leg pain.  Normal Tn x 2, EKG unchanged from baseline.  Essentially normal cath in 2015, normal stress in 2017, normal echo in 2018.  No further workup.    2.  Symptomatic bradycardia s/p PPM -- normal function    We will see as needed.

## 2018-10-04 NOTE — THERAPY DISCHARGE NOTE
Acute Care - Physical Therapy Treatment Note/Discharge  [facility]     Patient Name: Chanell Tafoya  : 1950  MRN: [id number]  Today's Date: 10/4/2018  Onset of Illness/Injury or Date of Surgery: 10/01/18  Date of Referral to PT: 10/01/18  Referring Physician: Mathew Fontanez    Admit Date: 10/1/2018    Visit Dx:    ICD-10-CM ICD-9-CM   1. Difficulty walking R26.2 719.7   2. Primary osteoarthritis of right knee M17.11 715.16     Patient Active Problem List   Diagnosis   • Primary osteoarthritis of right knee   • Chest pain   • Constipation   • Difficulty walking       Physical Therapy Education     Title: PT OT SLP Therapies (Resolved)     Topic: Physical Therapy (Resolved)     Point: Mobility training (Resolved)    Learning Progress Summary     Learner Status Readiness Method Response Comment Documented by    Patient Done Acceptance NESSA FLEMING NR  MS 10/01/18 1148          Point: Home exercise program (Resolved)    Learning Progress Summary     Learner Status Readiness Method Response Comment Documented by    Patient Done Acceptance NESSA FLEMING NR  MS 10/01/18 1148          Point: Body mechanics (Resolved)    Learning Progress Summary     Learner Status Readiness Method Response Comment Documented by    Patient Done Acceptance NESSA FLEMING NR  MS 10/01/18 1148          Point: Precautions (Resolved)    Learning Progress Summary     Learner Status Readiness Method Response Comment Documented by    Patient Done NESSA Goldstein NR  MS 10/01/18 1148                      User Key     Initials Effective Dates Name Provider Type Discipline    MS 18 -  Alan Godinez PT Physical Therapist PT                Therapy Treatment        Rehabilitation Treatment Summary     Row Name 10/04/18 1200             Treatment Time/Intention    Discipline physical therapist  -DINA      Document Type therapy note (daily note)  -DINA      Subjective Information complains of;pain  -DINA      Mode of Treatment physical therapy  -DINA       Therapy Frequency (PT Clinical Impression) 2 times/day  -KH      Patient Effort good  -KH      Recorded by [DINA] America Navarrete, PT 10/04/18 1256      Row Name 10/04/18 1200             Bed Mobility Assessment/Treatment    Comment (Bed Mobility) in chair  -KH      Recorded by [KH] America Navarrete, PT 10/04/18 1256      Row Name 10/04/18 1200             Sit-Stand Transfer    Sit-Stand Bertram (Transfers) supervision  -KH      Assistive Device (Sit-Stand Transfers) walker, front-wheeled  -KH      Recorded by [DINA] America Navarrete, PT 10/04/18 1256      Row Name 10/04/18 1200             Stand-Sit Transfer    Stand-Sit Bertram (Transfers) supervision  -KH      Assistive Device (Stand-Sit Transfers) walker, front-wheeled  -KH      Recorded by [DINA] America Navarrete, PT 10/04/18 1256      Row Name 10/04/18 1200             Gait/Stairs Assessment/Training    Bertram Level (Gait) supervision  -KH      Assistive Device (Gait) walker, front-wheeled  -KH      Distance in Feet (Gait) 85  -KH      Pattern (Gait) step-through  -KH      Deviations/Abnormal Patterns (Gait) antalgic;amber decreased  -KH      Bertram Level (Stairs) supervision  -KH      Handrail Location (Stairs) both sides  -KH      Number of Steps (Stairs) 4  -KH      Ascending Technique (Stairs) step-to-step  -KH      Descending Technique (Stairs) step-to-step  -KH      Recorded by [DINA] America Navarrete, PT 10/04/18 1256      Row Name 10/04/18 1200             General ROM    GENERAL ROM COMMENTS 6-80  -KH      Recorded by [DINA] America Navarrete, PT 10/04/18 1256      Row Name 10/04/18 1200             Therapeutic Exercise    Comment (Therapeutic Exercise) R TKA protocol x 20 reps  -KH      Recorded by [DINA] America Navarrete, PT 10/04/18 1256      Row Name 10/04/18 1200             Positioning and Restraints    Pre-Treatment Position sitting in chair/recliner  -KH      Post Treatment Position  bathroom  -KH      Bathroom sitting;call light within reach;encouraged to call for assist  -KH      Recorded by [DINA] America Navarrete, PT 10/04/18 1256      Row Name 10/04/18 1200             Pain Scale: Numbers Pre/Post-Treatment    Pain Scale: Numbers, Pretreatment 4/10  -KH      Pain Scale: Numbers, Post-Treatment 5/10  -KH      Pain Location - Side Right  -KH      Pain Location knee  -KH      Recorded by [DINA] America Navarrete, PT 10/04/18 1256      Row Name                Wound 10/01/18 0835 Right knee incision    Wound - Properties Group Date first assessed: 10/01/18 [SB] Time first assessed: 0835 [SB] Side: Right [SB] Location: knee [SB] Type: incision [SB] Recorded by:  [SB] Cherie Matthews, RN 10/01/18 0835    Row Name 10/04/18 1200             Outcome Summary/Treatment Plan (PT)    Anticipated Discharge Disposition (PT) home with home health  -KH      Recorded by [DINA] America Navarrete, PT 10/04/18 1256        User Key  (r) = Recorded By, (t) = Taken By, (c) = Cosigned By    Initials Name Effective Dates Discipline    America Canales, PT 06/08/18 -  PT    SB Cherie Matthews, RN 06/16/16 -  Nurse        Wound 10/01/18 0835 Right knee incision (Active)   Dressing Appearance dry;intact;no drainage 10/4/2018  4:28 AM   Closure JANETT 10/3/2018  4:16 PM   Base dressing in place, unable to visualize 10/3/2018  4:16 PM   Drainage Amount none 10/3/2018  4:16 PM       PT Recommendation and Plan  Anticipated Discharge Disposition (PT): home with home health  Therapy Frequency (PT Clinical Impression): 2 times/day  Outcome Summary/Treatment Plan (PT)  Anticipated Discharge Disposition (PT): home with home health  Outcome Summary: Progressing well with ROM, HEP and ambualtion. Pt is safe to d/c home when medically stable.          Outcome Measures     Row Name 10/04/18 1200 10/03/18 1000          How much help from another person do you currently need...    Turning from your  back to your side while in flat bed without using bedrails? 4  -KH 4  -CW     Moving from lying on back to sitting on the side of a flat bed without bedrails? 4  -KH 4  -CW     Moving to and from a bed to a chair (including a wheelchair)? 4  -KH 3  -CW     Standing up from a chair using your arms (e.g., wheelchair, bedside chair)? 4  -KH 3  -CW     Climbing 3-5 steps with a railing? 3  -KH 3  -CW     To walk in hospital room? 4  -KH 3  -CW     AM-PAC 6 Clicks Score 23  -KH 20  -CW        Functional Assessment    Outcome Measure Options AM-PAC 6 Clicks Basic Mobility (PT)  -KH AM-PAC 6 Clicks Basic Mobility (PT)  -CW       User Key  (r) = Recorded By, (t) = Taken By, (c) = Cosigned By    Initials Name Provider Type    America Canales, PT Physical Therapist    CW Rigo Wheat, PTA Physical Therapy Assistant           Time Calculation:         PT Charges     Row Name 10/04/18 1253             Time Calculation    Start Time 1040  -KH      Stop Time 1120  -KH      Time Calculation (min) 40 min  -KH         Time Calculation- PT    Total Timed Code Minutes- PT 16 minute(s)  -KH        User Key  (r) = Recorded By, (t) = Taken By, (c) = Cosigned By    Initials Name Provider Type    America Canales, PT Physical Therapist        Therapy Suggested Charges     Code   Minutes Charges    None             Therapy Charges for Today     Code Description Service Date Service Provider Modifiers Qty    07147881695 HC PT THER PROC EA 15 MIN 10/4/2018 America Navarrete, PT GP 1    [id number]  PT THER PROC GROUP 10/4/2018 America Navarrete, PT GP 1          PT G-Codes  PT Professional Judgement Used?: Yes  Outcome Measure Options: AM-PAC 6 Clicks Basic Mobility (PT)  AM-PAC 6 Clicks Score: 23  Functional Limitation: Mobility: Walking and moving around  Mobility: Walking and Moving Around Current Status (): At least 20 percent but less than 40 percent impaired, limited or  restricted  Mobility: Walking and Moving Around Goal Status (): 0 percent impaired, limited or restricted    PT Discharge Summary  Anticipated Discharge Disposition (PT): home with home health    America Navarrete, PT  10/4/2018

## 2018-10-04 NOTE — PLAN OF CARE
Problem: Patient Care Overview  Goal: Plan of Care Review  Outcome: Ongoing (interventions implemented as appropriate)   10/04/18 0316   Coping/Psychosocial   Plan of Care Reviewed With patient   OTHER   Outcome Summary HTN well controlled with PO meds. pain well controlled.    Plan of Care Review   Progress improving       Problem: Fall Risk (Adult)  Goal: Absence of Fall  Outcome: Ongoing (interventions implemented as appropriate)   10/04/18 0316   Fall Risk (Adult)   Absence of Fall making progress toward outcome

## 2018-10-04 NOTE — THERAPY TREATMENT NOTE
Acute Care - Physical Therapy Treatment Note  [facility]     Patient Name: Chanell Tafoya  : 1950  MRN: [id number]  Today's Date: 10/4/2018  Onset of Illness/Injury or Date of Surgery: 10/01/18  Date of Referral to PT: 10/01/18  Referring Physician: Mathew Fontanez    Admit Date: 10/1/2018    Visit Dx:    ICD-10-CM ICD-9-CM   1. Difficulty walking R26.2 719.7   2. Primary osteoarthritis of right knee M17.11 715.16     Patient Active Problem List   Diagnosis   • Primary osteoarthritis of right knee   • Chest pain   • Constipation   • Difficulty walking       Therapy Treatment          Rehabilitation Treatment Summary     Row Name 10/04/18 1600 10/04/18 1200          Treatment Time/Intention    Discipline physical therapist  - physical therapist  -     Document Type therapy note (daily note)  - therapy note (daily note)  -     Subjective Information  -- complains of;pain  -     Mode of Treatment physical therapy  - physical therapy  -     Therapy Frequency (PT Clinical Impression) 2 times/day  - 2 times/day  -     Patient Effort good  - good  -KH     Recorded by [KH] America Navarrete, PT 10/04/18 1618 [DINA] America Navarrete, PT 10/04/18 1256     Row Name 10/04/18 1600 10/04/18 1200          Bed Mobility Assessment/Treatment    Comment (Bed Mobility) in chair  -KH in chair  -KH     Recorded by [DINA] America Navarrete, PT 10/04/18 1618 [DINA] America Navarrete, PT 10/04/18 1256     Row Name 10/04/18 1600 10/04/18 1200          Sit-Stand Transfer    Sit-Stand [place] (Transfers) supervision  - supervision  -     Assistive Device (Sit-Stand Transfers) walker, front-wheeled  -DINA walker, front-wheeled  -KH     Recorded by [DINA] America Navarrete, PT 10/04/18 1618 [DINA] America Navarrete, PT 10/04/18 1256     Row Name 10/04/18 1600 10/04/18 1200          Stand-Sit Transfer    Stand-Sit [place] (Transfers) supervision  - supervision  -      Assistive Device (Stand-Sit Transfers) walker, front-wheeled  - walker, front-wheeled  -KH     Recorded by [] America Navarrete, PT 10/04/18 1618 [DINA] America Navarrete, PT 10/04/18 1256     Row Name 10/04/18 1600 10/04/18 1200          Gait/Stairs Assessment/Training    [place] Level (Gait) supervision  - supervision  -     Assistive Device (Gait) walker, front-wheeled  -KH walker, front-wheeled  -KH     Distance in Feet (Gait) 85  -KH 85  -KH     Pattern (Gait) step-through  -KH step-through  -KH     Deviations/Abnormal Patterns (Gait) antalgic;amber decreased  -KH antalgic;amber decreased  -     [place] Level (Stairs)  -- supervision  -     Handrail Location (Stairs)  -- both sides  -KH     Number of Steps (Stairs)  -- 4  -KH     Ascending Technique (Stairs)  -- step-to-step  -KH     Descending Technique (Stairs)  -- step-to-step  -KH     Recorded by [DINA] America Navarrete, PT 10/04/18 1618 [DINA] America Navarrete, PT 10/04/18 1256     Row Name 10/04/18 1200             General ROM    GENERAL ROM COMMENTS 6-80  -KH      Recorded by [DINA] America Navarrete, PT 10/04/18 1256      Row Name 10/04/18 1600 10/04/18 1200          Therapeutic Exercise    Comment (Therapeutic Exercise) R TKA protocol x 25 reps  -KH R TKA protocol x 20 reps  -KH     Recorded by [DINA] America Navarrete, PT 10/04/18 1618 [DINA] America Navarrete, PT 10/04/18 1256     Row Name 10/04/18 1600 10/04/18 1200          Positioning and Restraints    Pre-Treatment Position sitting in chair/recliner  -KH sitting in chair/recliner  -KH     Post Treatment Position bathroom  -KH bathroom  -KH     Bathroom sitting;call light within reach;encouraged to call for assist;with family/caregiver  -KH sitting;call light within reach;encouraged to call for assist  -KH     Recorded by [DINA] America Navarrete, PT 10/04/18 1618 [DINA] America Navarrete, PT 10/04/18 1256     Row Name 10/04/18 6254  10/04/18 1200          Pain Scale: Numbers Pre/Post-Treatment    Pain Scale: Numbers, Pretreatment 4/10  -KH 4/10  -KH     Pain Scale: Numbers, Post-Treatment 5/10  -KH 5/10  -KH     Pain Location - Side Right  -KH Right  -KH     Pain Location knee  -KH knee  -KH     Recorded by [] America Navarrete, PT 10/04/18 1618 [] America Navarrete, PT 10/04/18 1256     Row Name                Wound 10/01/18 0835 Right knee incision    Wound - Properties Group Date first assessed: 10/01/18 [SB] Time first assessed: 0835 [SB] Side: Right [SB] Location: knee [SB] Type: incision [SB] Recorded by:  [SB] Cherie Matthews RN 10/01/18 0835    Row Name 10/04/18 1600 10/04/18 1200          Outcome Summary/Treatment Plan (PT)    Anticipated Discharge Disposition (PT) home with home health  -KH home with home health  -KH     Recorded by [DINA] America Navarrete, PT 10/04/18 1618 [] America Navarrete, PT 10/04/18 1256       User Key  (r) = Recorded By, (t) = Taken By, (c) = Cosigned By    Initials Name Effective Dates Discipline    America Canales, PT 06/08/18 -  PT    Cherie Velez RN 06/16/16 -  Nurse          Wound 10/01/18 0835 Right knee incision (Active)   Dressing Appearance dry;intact;no drainage 10/4/2018  4:28 AM             Physical Therapy Education     Title: PT OT SLP Therapies (Resolved)     Topic: Physical Therapy (Resolved)     Point: Mobility training (Resolved)    Learning Progress Summary     Learner Status Readiness Method Response Comment Documented by    Patient Done Acceptance NESSA FLEMING NR  MS 10/01/18 1148          Point: Home exercise program (Resolved)    Learning Progress Summary     Learner Status Readiness Method Response Comment Documented by    Patient Done Acceptance NESSA FLEMING NR  MS 10/01/18 1148          Point: Body mechanics (Resolved)    Learning Progress Summary     Learner Status Readiness Method Response Comment Documented by    Patient Done  Acceptance NESSA FLEMING NR  MS 10/01/18 1148          Point: Precautions (Resolved)    Learning Progress Summary     Learner Status Readiness Method Response Comment Documented by    Patient Done Acceptance NESSA FLEMING NR  MS 10/01/18 1148                      User Key     Initials Effective Dates Name Provider Type Discipline    MS 04/03/18 -  Alan Godinez, PT Physical Therapist PT                    PT Recommendation and Plan  Anticipated Discharge Disposition (PT): home with home health  Therapy Frequency (PT Clinical Impression): 2 times/day  Outcome Summary/Treatment Plan (PT)  Anticipated Discharge Disposition (PT): home with home health  Outcome Summary: Tolerated increased reps. Reports increased fatogue this afternoon while walking. Will continue to progress as tolerated.          Outcome Measures     Row Name 10/04/18 1600 10/04/18 1200 10/03/18 1000       How much help from another person do you currently need...    Turning from your back to your side while in flat bed without using bedrails? 4  -KH 4  -KH 4  -CW    Moving from lying on back to sitting on the side of a flat bed without bedrails? 4  -KH 4  -KH 4  -CW    Moving to and from a bed to a chair (including a wheelchair)? 4  -KH 4  -KH 3  -CW    Standing up from a chair using your arms (e.g., wheelchair, bedside chair)? 4  -KH 4  -KH 3  -CW    Climbing 3-5 steps with a railing? 4  -KH 3  -KH 3  -CW    To walk in hospital room? 4  -KH 4  -KH 3  -CW    AM-PAC 6 Clicks Score 24  -KH 23  -KH 20  -CW       Functional Assessment    Outcome Measure Options AM-PAC 6 Clicks Basic Mobility (PT)  -KH AM-PAC 6 Clicks Basic Mobility (PT)  -KH AM-PAC 6 Clicks Basic Mobility (PT)  -CW      User Key  (r) = Recorded By, (t) = Taken By, (c) = Cosigned By    Initials Name Provider Type    America Canales, PT Physical Therapist    CW Rigo Wheat, PTA Physical Therapy Assistant           Time Calculation:         PT Charges     Row Name 10/04/18 8017  10/04/18 1253          Time Calculation    Start Time 1520  -KH 1040  -KH     Stop Time 1600  -KH 1120  -KH     Time Calculation (min) 40 min  -KH 40 min  -KH     PT Received On 10/04/18  -KH  --     PT - Next Appointment 10/05/18  -DINA  --        Time Calculation- PT    Total Timed Code Minutes- PT 14 minute(s)  -KH 16 minute(s)  -KH       User Key  (r) = Recorded By, (t) = Taken By, (c) = Cosigned By    Initials Name Provider Type    America Canales, PT Physical Therapist        Therapy Suggested Charges     Code   Minutes Charges    None           Therapy Charges for Today     Code Description Service Date Service Provider Modifiers Qty    37545002089 HC PT THER PROC EA 15 MIN 10/4/2018 America Navarrete, PT GP 1    39349148790 HC PT THER PROC GROUP 10/4/2018 America Navarrete, PT GP 1    28409681211 HC PT THER PROC EA 15 MIN 10/4/2018 America Navarrete, PT GP 1    04167244506 HC PT THER PROC GROUP 10/4/2018 America Navarrete, PT GP 1          PT G-Codes  PT Professional Judgement Used?: Yes  Outcome Measure Options: AM-PAC 6 Clicks Basic Mobility (PT)  AM-PAC 6 Clicks Score: 24  Functional Limitation: Mobility: Walking and moving around  Mobility: Walking and Moving Around Current Status (): At least 20 percent but less than 40 percent impaired, limited or restricted  Mobility: Walking and Moving Around Goal Status (): 0 percent impaired, limited or restricted    America Navarrete, PT  10/4/2018

## 2018-10-04 NOTE — PLAN OF CARE
Problem: Patient Care Overview  Goal: Plan of Care Review  Outcome: Ongoing (interventions implemented as appropriate)   10/03/18 2021   Coping/Psychosocial   Plan of Care Reviewed With patient   OTHER   Outcome Summary Pt resting comfortably throughout shift today. Pain well controlled w/ po norco, states she is doing much better today. Increasing mobility d/t decreased level of overall pain. VSS. Dressing c/d/i. Cardiology to see.    Plan of Care Review   Progress improving       Problem: Fall Risk (Adult)  Goal: Absence of Fall  Outcome: Ongoing (interventions implemented as appropriate)   10/03/18 2021   Fall Risk (Adult)   Absence of Fall making progress toward outcome       Problem: Knee Arthroplasty (Total, Partial) (Adult)  Goal: Anesthesia/Sedation Recovery  Outcome: Outcome(s) achieved Date Met: 10/03/18   10/03/18 2021   Goal/Outcome Evaluation   Anesthesia/Sedation Recovery criteria met for transfer

## 2018-10-05 LAB
ALBUMIN SERPL-MCNC: 3.3 G/DL (ref 3.5–5.2)
ALBUMIN/GLOB SERPL: 1.1 G/DL
ALP SERPL-CCNC: 168 U/L (ref 39–117)
ALT SERPL W P-5'-P-CCNC: 116 U/L (ref 1–33)
ANION GAP SERPL CALCULATED.3IONS-SCNC: 11.8 MMOL/L
AST SERPL-CCNC: 62 U/L (ref 1–32)
BILIRUB SERPL-MCNC: 0.9 MG/DL (ref 0.1–1.2)
BUN BLD-MCNC: 13 MG/DL (ref 8–23)
BUN/CREAT SERPL: 19.7 (ref 7–25)
CALCIUM SPEC-SCNC: 8.7 MG/DL (ref 8.6–10.5)
CHLORIDE SERPL-SCNC: 102 MMOL/L (ref 98–107)
CO2 SERPL-SCNC: 27.2 MMOL/L (ref 22–29)
CREAT BLD-MCNC: 0.66 MG/DL (ref 0.57–1)
GFR SERPL CREATININE-BSD FRML MDRD: 89 ML/MIN/1.73
GLOBULIN UR ELPH-MCNC: 3.1 GM/DL
GLUCOSE BLD-MCNC: 112 MG/DL (ref 65–99)
HAV IGM SERPL QL IA: NORMAL
HBV CORE IGM SERPL QL IA: NORMAL
HBV SURFACE AG SERPL QL IA: NORMAL
HCV AB SER DONR QL: NORMAL
INR PPP: 1.21 (ref 0.9–1.1)
POTASSIUM BLD-SCNC: 3.8 MMOL/L (ref 3.5–5.2)
PROT SERPL-MCNC: 6.4 G/DL (ref 6–8.5)
PROTHROMBIN TIME: 15 SECONDS (ref 11.7–14.2)
SODIUM BLD-SCNC: 141 MMOL/L (ref 136–145)

## 2018-10-05 PROCEDURE — 99232 SBSQ HOSP IP/OBS MODERATE 35: CPT | Performed by: INTERNAL MEDICINE

## 2018-10-05 PROCEDURE — 80053 COMPREHEN METABOLIC PANEL: CPT | Performed by: HOSPITALIST

## 2018-10-05 PROCEDURE — 97110 THERAPEUTIC EXERCISES: CPT

## 2018-10-05 PROCEDURE — 85610 PROTHROMBIN TIME: CPT | Performed by: ORTHOPAEDIC SURGERY

## 2018-10-05 PROCEDURE — 97150 GROUP THERAPEUTIC PROCEDURES: CPT

## 2018-10-05 PROCEDURE — 80074 ACUTE HEPATITIS PANEL: CPT | Performed by: INTERNAL MEDICINE

## 2018-10-05 RX ORDER — OXYCODONE HYDROCHLORIDE 10 MG/1
10 TABLET ORAL EVERY 4 HOURS PRN
Qty: 15 TABLET | Refills: 0 | Status: SHIPPED | OUTPATIENT
Start: 2018-10-05 | End: 2018-10-08 | Stop reason: SDUPTHER

## 2018-10-05 RX ADMIN — DOCUSATE SODIUM 100 MG: 100 CAPSULE, LIQUID FILLED ORAL at 09:05

## 2018-10-05 RX ADMIN — POLYETHYLENE GLYCOL 3350 17 G: 17 POWDER, FOR SOLUTION ORAL at 09:05

## 2018-10-05 RX ADMIN — WARFARIN SODIUM 5 MG: 5 TABLET ORAL at 17:10

## 2018-10-05 RX ADMIN — OXYCODONE HYDROCHLORIDE 10 MG: 5 TABLET ORAL at 05:04

## 2018-10-05 RX ADMIN — HYDROCHLOROTHIAZIDE 25 MG: 25 TABLET ORAL at 09:05

## 2018-10-05 RX ADMIN — LOSARTAN POTASSIUM 50 MG: 50 TABLET, FILM COATED ORAL at 09:05

## 2018-10-05 RX ADMIN — OXYCODONE HYDROCHLORIDE 10 MG: 5 TABLET ORAL at 13:36

## 2018-10-05 RX ADMIN — OXYCODONE HYDROCHLORIDE 10 MG: 5 TABLET ORAL at 09:05

## 2018-10-05 RX ADMIN — OXYCODONE HYDROCHLORIDE 10 MG: 5 TABLET ORAL at 22:44

## 2018-10-05 RX ADMIN — PANTOPRAZOLE SODIUM 40 MG: 40 TABLET, DELAYED RELEASE ORAL at 05:04

## 2018-10-05 RX ADMIN — OXYCODONE HYDROCHLORIDE 10 MG: 5 TABLET ORAL at 18:27

## 2018-10-05 RX ADMIN — POLYETHYLENE GLYCOL 3350 17 G: 17 POWDER, FOR SOLUTION ORAL at 21:37

## 2018-10-05 RX ADMIN — DOCUSATE SODIUM 100 MG: 100 CAPSULE, LIQUID FILLED ORAL at 21:37

## 2018-10-05 RX ADMIN — POLYETHYLENE GLYCOL 3350 17 G: 17 POWDER, FOR SOLUTION ORAL at 16:07

## 2018-10-05 RX ADMIN — METOPROLOL SUCCINATE 12.5 MG: 25 TABLET, FILM COATED, EXTENDED RELEASE ORAL at 09:06

## 2018-10-05 NOTE — PLAN OF CARE
Problem: Patient Care Overview  Goal: Plan of Care Review  Outcome: Ongoing (interventions implemented as appropriate)   10/05/18 8703   Coping/Psychosocial   Plan of Care Reviewed With patient;spouse   OTHER   Outcome Summary POD 4 RTK, VSS, liver enzymes are decreasing, repeat CMP in am, should d/c tomorrow, BRANDON c/d/i, educated on bp monitoring    Plan of Care Review   Progress improving     Goal: Individualization and Mutuality  Outcome: Ongoing (interventions implemented as appropriate)      Problem: Fall Risk (Adult)  Goal: Identify Related Risk Factors and Signs and Symptoms  Outcome: Outcome(s) achieved Date Met: 10/05/18      Problem: Hypertensive Disease/Crisis (Arterial) (Adult)  Goal: Signs and Symptoms of Listed Potential Problems Will be Absent, Minimized or Managed (Hypertensive Disease/Crisis)  Outcome: Ongoing (interventions implemented as appropriate)

## 2018-10-05 NOTE — PLAN OF CARE
Problem: Patient Care Overview  Goal: Plan of Care Review  Outcome: Ongoing (interventions implemented as appropriate)   10/05/18 0403   Coping/Psychosocial   Plan of Care Reviewed With patient   OTHER   Outcome Summary HTN well controlled with PO meds. pain well controlled. pt amb well with assist and wx.    Plan of Care Review   Progress improving       Problem: Fall Risk (Adult)  Goal: Absence of Fall  Outcome: Ongoing (interventions implemented as appropriate)   10/05/18 0403   Fall Risk (Adult)   Absence of Fall making progress toward outcome

## 2018-10-05 NOTE — NURSING NOTE
Spoke to Dr. Miles (GI) and she is okay with pt d/c today as long as pt has f/u with PCP on Monday for liver labs

## 2018-10-05 NOTE — PLAN OF CARE
Problem: Patient Care Overview  Goal: Plan of Care Review  Outcome: Ongoing (interventions implemented as appropriate)   10/05/18 1132   Coping/Psychosocial   Plan of Care Reviewed With patient   OTHER   Outcome Summary Pt is increasing with ROM and strength in the R LE to improve with transfers and amb safety with RWX   Plan of Care Review   Progress improving

## 2018-10-05 NOTE — PLAN OF CARE
Problem: Patient Care Overview  Goal: Plan of Care Review  Outcome: Ongoing (interventions implemented as appropriate)   10/04/18 2040   Coping/Psychosocial   Plan of Care Reviewed With patient   OTHER   Outcome Summary POD 3 RTK, VSS, was to be d/c'd today but elevated liver enzymes, GI consulted, U/S Liver ordered-pt is to be NPO for 4hrs prior plan to do 2200, BRANDON c/d/i, worked with PT, placed call out to Dr. Sloan to let know pt staying but no call returned, educated on bp monitoring, continue to monitor   Plan of Care Review   Progress improving       Problem: Fall Risk (Adult)  Goal: Identify Related Risk Factors and Signs and Symptoms  Outcome: Ongoing (interventions implemented as appropriate)      Problem: Hypertensive Disease/Crisis (Arterial) (Adult)  Goal: Signs and Symptoms of Listed Potential Problems Will be Absent, Minimized or Managed (Hypertensive Disease/Crisis)  Outcome: Ongoing (interventions implemented as appropriate)

## 2018-10-05 NOTE — PROGRESS NOTES
[facility]  Inpatient Progress Note    Reason for Follow Up:  Elevated liver enzymes    Subjective     Interval History:   Feels well today.  BMs following mag citrate.  CMP better    Current Facility-Administered Medications:   •  bisacodyl (DULCOLAX) suppository 10 mg, 10 mg, Rectal, Daily PRN, Verona Mckeno L, APRN  •  docusate sodium (COLACE) capsule 100 mg, 100 mg, Oral, BID, Verona Mckeon L, APRN, 100 mg at 10/05/18 0905  •  hydrochlorothiazide (HYDRODIURIL) tablet 25 mg, 25 mg, Oral, Daily, Abigail Mckeonisa L, APRN, 25 mg at 10/05/18 0905  •  ketorolac (TORADOL) injection 30 mg, 30 mg, Intravenous, Q6H PRN, Mathew Sloan MD  •  losartan (COZAAR) tablet 50 mg, 50 mg, Oral, Daily, Verona Mckeon L, APRN, 50 mg at 10/05/18 0905  •  melatonin tablet 3 mg, 3 mg, Oral, Nightly PRN, Verona Mckeon L, APRN  •  metoprolol succinate XL (TOPROL-XL) 24 hr tablet 12.5 mg, 12.5 mg, Oral, Daily, Verona Mckeon, APRN, 12.5 mg at 10/05/18 0906  •  ondansetron (ZOFRAN) tablet 4 mg, 4 mg, Oral, Q6H PRN **OR** ondansetron ODT (ZOFRAN-ODT) disintegrating tablet 4 mg, 4 mg, Oral, Q6H PRN **OR** ondansetron (ZOFRAN) injection 4 mg, 4 mg, Intravenous, Q6H PRN, Verona Mckeon L, APRN  •  oxyCODONE (ROXICODONE) immediate release tablet 10 mg, 10 mg, Oral, Q4H PRN, Isaak Khalil MD, 10 mg at 10/05/18 0905  •  pantoprazole (PROTONIX) EC tablet 40 mg, 40 mg, Oral, QAM, Verona Mckeon L, APRN, 40 mg at 10/05/18 0504  •  polyethylene glycol 3350 powder (packet), 17 g, Oral, TID, Isaak Khalil MD, 17 g at 10/05/18 0905  •  warfarin (COUMADIN) tablet 5 mg, 5 mg, Oral, Daily, Mathew Sloan MD, 5 mg at 10/04/18 1817    Facility-Administered Medications Ordered in Other Encounters:   •  mupirocin (BACTROBAN) 2 % nasal ointment, , Nasal, BID, Mathew Sloan MD  Review of Systems:    The following systems were reviewed and negative;  constitution, respiratory, cardiovascular and  gastrointestinal    Objective     Vital Signs  Temp:  [98.9 °F (37.2 °C)-100.8 °F (38.2 °C)] 98.9 °F (37.2 °C)  Heart Rate:  [67-86] 67  Resp:  [16] 16  BP: (108-141)/(62-72) 141/72  Body mass index is 36.93 kg/m².  No intake or output data in the 24 hours ending 10/05/18 1142  No intake/output data recorded.     Physical Exam:   General: patient awake, alert and cooperative   Eyes: Normal lids and lashes, no scleral icterus   Neck: supple, normal ROM   Skin: warm and dry, not jaundiced   Cardiovascular: regular rhythm and rate, no murmurs auscultated   Pulm: clear to auscultation bilaterally, regular and unlabored   Abdomen: soft, nontender, nondistended; normal bowel sounds   Rectal: deferred   Extremities: no rash or edema   Psychiatric: Normal mood and behavior; memory intact     Results Review:     I reviewed the patient's new clinical results.      Results from last 7 days  Lab Units 10/04/18  0458 10/03/18  1409 10/03/18  0322   WBC 10*3/mm3  --  11.38*  --    HEMOGLOBIN g/dL 11.7* 11.9 11.3*   HEMATOCRIT % 37.3 38.1 36.1   PLATELETS 10*3/mm3  --  288  --        Results from last 7 days  Lab Units 10/05/18  0448 10/04/18  0458 10/03/18  1409   SODIUM mmol/L 141 138 139   POTASSIUM mmol/L 3.8 3.8 3.2*   CHLORIDE mmol/L 102 98 99   CO2 mmol/L 27.2 27.8 27.7   BUN mg/dL 13 10 11   CREATININE mg/dL 0.66 0.69 0.76   CALCIUM mg/dL 8.7 9.3 9.3   BILIRUBIN mg/dL 0.9 1.6* 1.3*   ALK PHOS U/L 168* 227* 105   ALT (SGPT) U/L 116* 230* 118*   AST (SGOT) U/L 62* 370* 103*   GLUCOSE mg/dL 112* 128* 123*       Results from last 7 days  Lab Units 10/05/18  0448 10/04/18  0458 10/03/18  0322   INR  1.21* 1.16* 1.19*     No results found for: LIPASE    Radiology:  US Liver   Final Result       1. Mildly increased liver echotexture may reflect hepatic steatosis.       This report was finalized on 10/5/2018 12:09 AM by Dr. Liya Caldwell M.D.          XR Knee 1 or 2 View Right   Final Result          Assessment/Plan      Patient Active Problem List   Diagnosis   • Primary osteoarthritis of right knee   • Chest pain   • Constipation   • Difficulty walking       Impression  1.  Elevated liver enzymes: Improvement today, findings of steatosis on her imaging.  Negative hepatitis panel.  Suspect secondary to medications or postoperative with underlying hepatic steatosis given prior normal labwork    2.  Constipation: Opioid induced: Improved    Plan  Repeat CMP in a.m, if continued decline, she can repeat a CMP in a few weeks with her PCP    If liver elevations persist in the outpt setting, will need further serologic workup and can follow up with Dr Miles in the office    Continue bowel regimen, limit opioids if able        I discussed the patients findings and my recommendations with patient and nursing staff.    Dorita Butler MD

## 2018-10-05 NOTE — NURSING NOTE
Spoke to Dr. Sloan to make him aware that pt is staying another day due to liver enzyme levels; repeat labs in the am; he is okay with her staying.

## 2018-10-05 NOTE — PROGRESS NOTES
[facility]     LOS: 3 days     Subjective:  No chest pain  Eating normally  No soa  No v/n  Had a bowel movement yesterday    Objective:    Vital Signs:  Temp:  [97.6 °F (36.4 °C)-99.3 °F (37.4 °C)] 98.2 °F (36.8 °C)  Heart Rate:  [60-73] 67  Resp:  [16-18] 18  BP: (106-135)/(59-72) 128/71    SpO2:  [95 %-97 %] 96 %  on   ;   Device (Oxygen Therapy): room air  Body mass index is 36.93 kg/m².    Physical Exam   Constitutional: She appears well-developed and well-nourished.   HENT:   Head: Normocephalic and atraumatic.   Cardiovascular: Normal rate and regular rhythm.    No murmur heard.  Pulmonary/Chest: Effort normal and breath sounds normal.   Abdominal: Soft. Bowel sounds are normal. She exhibits no distension. There is no tenderness.   Musculoskeletal:   Right knee with a dressing   Neurological: She is alert.   Skin: Skin is warm and dry.       Results Review:    Glucose   Date Value Ref Range Status   10/06/2018 122 (H) 65 - 99 mg/dL Final   10/05/2018 112 (H) 65 - 99 mg/dL Final   10/04/2018 128 (H) 65 - 99 mg/dL Final   10/03/2018 123 (H) 65 - 99 mg/dL Final       Results from last 7 days  Lab Units 10/04/18  0458 10/03/18  1409   WBC 10*3/mm3  --  11.38*   HEMOGLOBIN g/dL 11.7* 11.9   HEMATOCRIT % 37.3 38.1   PLATELETS 10*3/mm3  --  288       Results from last 7 days  Lab Units 10/06/18  0414   SODIUM mmol/L 140   POTASSIUM mmol/L 3.9   CHLORIDE mmol/L 101   CO2 mmol/L 25.7   BUN mg/dL 12   CREATININE mg/dL 0.65   CALCIUM mg/dL 9.1   BILIRUBIN mg/dL 1.1   ALK PHOS U/L 152*   ALT (SGPT) U/L 80*   AST (SGOT) U/L 30   GLUCOSE mg/dL 122*       Results from last 7 days  Lab Units 10/06/18  0414   INR  1.17*           Results from last 7 days  Lab Units 10/03/18  1409 10/02/18  1400   TROPONIN T ng/mL <0.010 <0.010     Cultures:       I have reviewed daily medications and changes in CPOE    Scheduled meds    docusate sodium 100 mg Oral BID   hydrochlorothiazide 25 mg Oral Daily    losartan 50 mg Oral Daily   metoprolol succinate XL 12.5 mg Oral Daily   pantoprazole 40 mg Oral QAM   polyethylene glycol 17 g Oral TID   warfarin 5 mg Oral Daily          PRN meds  •  bisacodyl  •  ketorolac  •  melatonin  •  ondansetron **OR** ondansetron ODT **OR** ondansetron  •  oxyCODONE      Principal Problem:    Primary osteoarthritis of right knee  Active Problems:    Chest pain    Constipation    Difficulty walking        Assessment/Plan:    Elevated LFTs  -likely from tylenol, hold on tylenol, will use oxycodone, recheck in am  -GI consult  -script for the oxycodone written  -demetrio reviewed and no scripts noted    htn- continue with the metoprolol  hctz 1/2 dose on d/c, bp is a little on the low side      Primary osteoarthritis of right knee  -doing well from this standpoint      Chest pain  -none overnight, seen by cardiology and no further workup  Per cardiology   -Normal Tn x 2, EKG unchanged from baseline.  Essentially normal cath in 2015, normal stress in 2017, normal echo in 2018.  No further workup.      Constipation  -now having bowel movement      Difficulty walking    dvt prophylaxis with coumadin per ortho    Plan:  lfts are much better, likely from tylenol    Gi consult appreciated and she needs f/u with them for likely hepatic steatosis seen on u/s     Hopefully d/c today    Isaak Khalil MD  10/06/18  10:56 AM

## 2018-10-05 NOTE — THERAPY TREATMENT NOTE
Acute Care - Physical Therapy Treatment Note  [facility]     Patient Name: Chanell Tafoya  : 1950  MRN: [id number]  Today's Date: 10/5/2018  Onset of Illness/Injury or Date of Surgery: 10/01/18  Date of Referral to PT: 10/01/18  Referring Physician: Mathew Fontanez    Admit Date: 10/1/2018    Visit Dx:    ICD-10-CM ICD-9-CM   1. Difficulty walking R26.2 719.7   2. Primary osteoarthritis of right knee M17.11 715.16     Patient Active Problem List   Diagnosis   • Primary osteoarthritis of right knee   • Chest pain   • Constipation   • Difficulty walking       Therapy Treatment          Rehabilitation Treatment Summary     Row Name 10/05/18 1100             Treatment Time/Intention    Discipline physical therapy assistant  -CW      Document Type therapy note (daily note)  -CW      Subjective Information complains of;pain  -CW      Mode of Treatment physical therapy  -CW      Therapy Frequency (PT Clinical Impression) 2 times/day  -CW      Patient Effort good  -CW      Existing Precautions/Restrictions fall  -CW      Recorded by [CW] Rigo Wheat P, PTA 10/05/18 1132      Row Name 10/05/18 1100             Vital Signs    O2 Delivery Pre Treatment room air  -CW      Recorded by [CW] Rigo Wheat PTA 10/05/18 1132      Row Name 10/05/18 1100             Cognitive Assessment/Intervention- PT/OT    Orientation Status (Cognition) oriented x 3  -CW      Follows Commands (Cognition) WNL  -CW      Personal Safety Interventions fall prevention program maintained;gait belt;muscle strengthening facilitated;nonskid shoes/slippers when out of bed  -CW      Recorded by [CW] Rigo Wheat PTA 10/05/18 1132      Row Name 10/05/18 1100             Bed Mobility Assessment/Treatment    Comment (Bed Mobility) in chair  -CW      Recorded by [CW] Rigo Wheat PTA 10/05/18 1132      Row Name 10/05/18 1100             Transfer Assessment/Treatment    Transfer Assessment/Treatment sit-stand  transfer;stand-sit transfer  -CW      Recorded by [CW] Rigo Wheat, PTA 10/05/18 1132      Row Name 10/05/18 1100             Sit-Stand Transfer    Sit-Stand [place] (Transfers) supervision  -CW      Assistive Device (Sit-Stand Transfers) walker, front-wheeled  -CW      Recorded by [CW] Rigo Wheat, PTA 10/05/18 1132      Row Name 10/05/18 1100             Stand-Sit Transfer    Stand-Sit [place] (Transfers) supervision  -CW      Assistive Device (Stand-Sit Transfers) walker, front-wheeled  -CW      Recorded by [CW] Rigo Wheat, PTA 10/05/18 1132      Row Name 10/05/18 1100             Gait/Stairs Assessment/Training    [place] Level (Gait) supervision  -CW      Assistive Device (Gait) walker, front-wheeled  -CW      Distance in Feet (Gait) 85  -CW      Pattern (Gait) step-through  -CW      Deviations/Abnormal Patterns (Gait) antalgic;amber decreased  -CW      Recorded by [CW] Rigo Wheat, PTA 10/05/18 1132      Row Name 10/05/18 1100             General ROM    GENERAL ROM COMMENTS 7-82  -CW      Recorded by [CW] Rigo Wheat, PTA 10/05/18 1132      Row Name 10/05/18 1100             Therapeutic Exercise    Comment (Therapeutic Exercise) R TKA Protocol 30 reps  -CW      Recorded by [CW] Rigo Wheat, PTA 10/05/18 1132      Row Name 10/05/18 1100             Positioning and Restraints    Pre-Treatment Position sitting in chair/recliner  -CW      Post Treatment Position chair  -CW      In Chair notified nsg;reclined;call light within reach;encouraged to call for assist  -CW      Recorded by [CW] Rigo Wheat, PTA 10/05/18 1132      Row Name 10/05/18 1100             Pain Assessment    Additional Documentation Pain Scale: Numbers Pre/Post-Treatment (Group)  -CW      Recorded by [CW] Rigo Wheat, PTA 10/05/18 1132      Row Name 10/05/18 1100             Pain Scale: Numbers Pre/Post-Treatment    Pain Scale: Numbers, Pretreatment 4/10  -CW       Pain Scale: Numbers, Post-Treatment 4/10  -CW      Pain Location - Side Right  -CW      Pain Location knee  -CW      Pain Intervention(s) Repositioned;Ambulation/increased activity  -CW      Recorded by [CW] Rigo Wheat, PTA 10/05/18 1132      Row Name                Wound 10/01/18 0835 Right knee incision    Wound - Properties Group Date first assessed: 10/01/18 [SB] Time first assessed: 0835 [SB] Side: Right [SB] Location: knee [SB] Type: incision [SB] Recorded by:  [SB] Cherie Matthews RN 10/01/18 0835    Row Name 10/05/18 1100             Outcome Summary/Treatment Plan (PT)    Anticipated Discharge Disposition (PT) home with assist;home with home health  -CW      Recorded by [CW] Rigo Wheat, PTA 10/05/18 1132        User Key  (r) = Recorded By, (t) = Taken By, (c) = Cosigned By    Initials Name Effective Dates Discipline    SB Cherie Matthews RN 06/16/16 -  Nurse    CW Rigo Wheat, PTA 03/07/18 -  PT          Wound 10/01/18 0835 Right knee incision (Active)   Dressing Appearance dry;intact;no drainage 10/5/2018  9:05 AM   Closure JANETT 10/5/2018  9:05 AM   Base dressing in place, unable to visualize 10/5/2018  9:05 AM   Drainage Amount scant 10/5/2018  9:05 AM   Dressing Care, Wound other (see comments) 10/5/2018  9:05 AM             Physical Therapy Education     Title: PT OT SLP Therapies (Resolved)     Topic: Physical Therapy (Resolved)     Point: Mobility training (Resolved)    Learning Progress Summary     Learner Status Readiness Method Response Comment Documented by    Patient Done Acceptance NESSA FLEMING NR  MS 10/01/18 1148          Point: Home exercise program (Resolved)    Learning Progress Summary     Learner Status Readiness Method Response Comment Documented by    Patient Done Acceptance NESSA FLEMING NR  MS 10/01/18 1148          Point: Body mechanics (Resolved)    Learning Progress Summary     Learner Status Readiness Method Response Comment Documented by    Patient Done  Acceptance NESSA FLEMING NR  MS 10/01/18 1148          Point: Precautions (Resolved)    Learning Progress Summary     Learner Status Readiness Method Response Comment Documented by    Patient Done Acceptance NESSA FLEMING NR  MS 10/01/18 1148                      User Key     Initials Effective Dates Name Provider Type Discipline    MS 04/03/18 -  Alan Godinez, PT Physical Therapist PT                    PT Recommendation and Plan  Anticipated Discharge Disposition (PT): home with assist, home with home health  Therapy Frequency (PT Clinical Impression): 2 times/day  Outcome Summary/Treatment Plan (PT)  Anticipated Discharge Disposition (PT): home with assist, home with home health  Plan of Care Reviewed With: patient  Progress: improving  Outcome Summary: Pt is increasing with ROM and strength in the R LE to improve with transfers and amb sfaety with RWX          Outcome Measures     Row Name 10/05/18 1100 10/04/18 1600 10/04/18 1200       How much help from another person do you currently need...    Turning from your back to your side while in flat bed without using bedrails? 4  -CW 4  -KH 4  -KH    Moving from lying on back to sitting on the side of a flat bed without bedrails? 4  -CW 4  -KH 4  -KH    Moving to and from a bed to a chair (including a wheelchair)? 4  -CW 4  -KH 4  -KH    Standing up from a chair using your arms (e.g., wheelchair, bedside chair)? 4  -CW 4  -KH 4  -KH    Climbing 3-5 steps with a railing? 4  -CW 4  -KH 3  -KH    To walk in hospital room? 4  -CW 4  -KH 4  -KH    AM-PAC 6 Clicks Score 24  -CW 24  -KH 23  -KH       Functional Assessment    Outcome Measure Options AM-PAC 6 Clicks Basic Mobility (PT)  -CW AM-PAC 6 Clicks Basic Mobility (PT)  -KH AM-PAC 6 Clicks Basic Mobility (PT)  -KH    Row Name 10/03/18 1000             How much help from another person do you currently need...    Turning from your back to your side while in flat bed without using bedrails? 4  -CW      Moving from lying on back  to sitting on the side of a flat bed without bedrails? 4  -CW      Moving to and from a bed to a chair (including a wheelchair)? 3  -CW      Standing up from a chair using your arms (e.g., wheelchair, bedside chair)? 3  -CW      Climbing 3-5 steps with a railing? 3  -CW      To walk in hospital room? 3  -CW      AM-PAC 6 Clicks Score 20  -CW         Functional Assessment    Outcome Measure Options AM-PAC 6 Clicks Basic Mobility (PT)  -CW        User Key  (r) = Recorded By, (t) = Taken By, (c) = Cosigned By    Initials Name Provider Type    America Canales, PT Physical Therapist    Rigo Hurst PTA Physical Therapy Assistant           Time Calculation:         PT Charges     Row Name 10/05/18 1134             Time Calculation    Start Time 1037  -CW      Stop Time 1120  -CW      Time Calculation (min) 43 min  -CW      PT Received On 10/05/18  -CW        User Key  (r) = Recorded By, (t) = Taken By, (c) = Cosigned By    Initials Name Provider Type    Rigo Hurst PTA Physical Therapy Assistant        Therapy Suggested Charges     Code   Minutes Charges    None           Therapy Charges for Today     Code Description Service Date Service Provider Modifiers Qty    28056346818 HC PT THER PROC GROUP 10/5/2018 Rigo Wheat PTA GP 1    52427347003 HC PT THER PROC EA 15 MIN 10/5/2018 Rigo Wheat PTA GP 1          PT G-Codes  PT Professional Judgement Used?: Yes  Outcome Measure Options: AM-PAC 6 Clicks Basic Mobility (PT)  AM-PAC 6 Clicks Score: 24  Functional Limitation: Mobility: Walking and moving around  Mobility: Walking and Moving Around Current Status (): At least 20 percent but less than 40 percent impaired, limited or restricted  Mobility: Walking and Moving Around Goal Status (): 0 percent impaired, limited or restricted    Rigo Wheat PTA  10/5/2018

## 2018-10-06 VITALS
HEART RATE: 71 BPM | HEIGHT: 62 IN | RESPIRATION RATE: 20 BRPM | TEMPERATURE: 97.5 F | OXYGEN SATURATION: 96 % | WEIGHT: 201.94 LBS | SYSTOLIC BLOOD PRESSURE: 124 MMHG | DIASTOLIC BLOOD PRESSURE: 70 MMHG | BODY MASS INDEX: 37.16 KG/M2

## 2018-10-06 LAB
ALBUMIN SERPL-MCNC: 3.3 G/DL (ref 3.5–5.2)
ALBUMIN/GLOB SERPL: 0.9 G/DL
ALP SERPL-CCNC: 152 U/L (ref 39–117)
ALT SERPL W P-5'-P-CCNC: 80 U/L (ref 1–33)
ANION GAP SERPL CALCULATED.3IONS-SCNC: 13.3 MMOL/L
AST SERPL-CCNC: 30 U/L (ref 1–32)
BILIRUB SERPL-MCNC: 1.1 MG/DL (ref 0.1–1.2)
BUN BLD-MCNC: 12 MG/DL (ref 8–23)
BUN/CREAT SERPL: 18.5 (ref 7–25)
CALCIUM SPEC-SCNC: 9.1 MG/DL (ref 8.6–10.5)
CHLORIDE SERPL-SCNC: 101 MMOL/L (ref 98–107)
CO2 SERPL-SCNC: 25.7 MMOL/L (ref 22–29)
CREAT BLD-MCNC: 0.65 MG/DL (ref 0.57–1)
GFR SERPL CREATININE-BSD FRML MDRD: 90 ML/MIN/1.73
GLOBULIN UR ELPH-MCNC: 3.5 GM/DL
GLUCOSE BLD-MCNC: 122 MG/DL (ref 65–99)
INR PPP: 1.17 (ref 0.9–1.1)
POTASSIUM BLD-SCNC: 3.9 MMOL/L (ref 3.5–5.2)
PROT SERPL-MCNC: 6.8 G/DL (ref 6–8.5)
PROTHROMBIN TIME: 14.7 SECONDS (ref 11.7–14.2)
SODIUM BLD-SCNC: 140 MMOL/L (ref 136–145)

## 2018-10-06 PROCEDURE — 99232 SBSQ HOSP IP/OBS MODERATE 35: CPT | Performed by: INTERNAL MEDICINE

## 2018-10-06 PROCEDURE — 85610 PROTHROMBIN TIME: CPT | Performed by: ORTHOPAEDIC SURGERY

## 2018-10-06 PROCEDURE — 80053 COMPREHEN METABOLIC PANEL: CPT | Performed by: HOSPITALIST

## 2018-10-06 PROCEDURE — 97110 THERAPEUTIC EXERCISES: CPT

## 2018-10-06 RX ORDER — HYDROCHLOROTHIAZIDE 25 MG/1
12.5 TABLET ORAL DAILY
Start: 2018-10-06 | End: 2019-05-21 | Stop reason: SDUPTHER

## 2018-10-06 RX ORDER — HYDROCHLOROTHIAZIDE 25 MG/1
12.5 TABLET ORAL DAILY
Status: DISCONTINUED | OUTPATIENT
Start: 2018-10-06 | End: 2018-10-06 | Stop reason: HOSPADM

## 2018-10-06 RX ORDER — WARFARIN SODIUM 6 MG/1
6 TABLET ORAL NIGHTLY
Qty: 30 TABLET | Refills: 0 | Status: SHIPPED | OUTPATIENT
Start: 2018-10-06 | End: 2018-10-23 | Stop reason: SDUPTHER

## 2018-10-06 RX ADMIN — OXYCODONE HYDROCHLORIDE 10 MG: 5 TABLET ORAL at 04:35

## 2018-10-06 RX ADMIN — OXYCODONE HYDROCHLORIDE 10 MG: 5 TABLET ORAL at 08:41

## 2018-10-06 RX ADMIN — DOCUSATE SODIUM 100 MG: 100 CAPSULE, LIQUID FILLED ORAL at 08:41

## 2018-10-06 RX ADMIN — LOSARTAN POTASSIUM 50 MG: 50 TABLET, FILM COATED ORAL at 08:41

## 2018-10-06 RX ADMIN — HYDROCHLOROTHIAZIDE 12.5 MG: 25 TABLET ORAL at 08:41

## 2018-10-06 RX ADMIN — POLYETHYLENE GLYCOL 3350 17 G: 17 POWDER, FOR SOLUTION ORAL at 08:41

## 2018-10-06 RX ADMIN — PANTOPRAZOLE SODIUM 40 MG: 40 TABLET, DELAYED RELEASE ORAL at 06:14

## 2018-10-06 RX ADMIN — OXYCODONE HYDROCHLORIDE 10 MG: 5 TABLET ORAL at 13:13

## 2018-10-06 RX ADMIN — METOPROLOL SUCCINATE 12.5 MG: 25 TABLET, FILM COATED, EXTENDED RELEASE ORAL at 08:41

## 2018-10-06 NOTE — DISCHARGE INSTRUCTIONS
Discharge Instructions: Patient is to continue with physical therapy exercises daily and continue working with the physical therapist as ordered. Patient may weight bear as tolerated. Apply ice regularly. Patient may ice for long periods of time as long as ice is not directly on the skin. Patient instructed on frequent calf pumping exercises.  Patient also instructed on incentive spirometer during hospitalization and encouraged to continue to use at home regularly.    *The dressing should be left in place until the suction unit stops functioning (typically 7 days.  If waterproof dressing is intact the patient may shower immediately following discharge. If the dressing becomes disloged or saturated it should be changed. Please refer to the BRANDON information sheet if you have any questions about the dressing.  If you have a home health nurse or therapist they can be contacted to assist with dressing change or repair. You may also call the [place] dressing hotline for questions related to the dressing ([phone]). If there still other problems or questions related to the dressing despite these measures then you can contact [facility] at our office [phone].  After 1 week when the BRANDON dressing is removed, the wound should be gently cleaned with antibacterial soap then allowed to dry and covered with a dry sterile dressing. The wound should be covered at all times except while showering.  Patient may change dressings daily and prn using sterile 4x4 and paper tape, and should call if any unusual drainage, redness or swelling.*  Follow up appointment in 2 weeks - patient to call the office at [phone] to schedule.  Patient will be discharged on coumadin as directed to keep INR 2-3.  Please draw PT / INR in 2 days and call to office during business hours at [phone].

## 2018-10-06 NOTE — DISCHARGE SUMMARY
Patient Name: Chanell Tafoya  Patient YOB: 1950    Date of Admission:  10/1/2018  Date of Discharge:  10/6/2018  Discharge Diagnosis: TOTAL KNEE ARTHROPLASTY  Presenting Problem/History of Present Illness: Primary osteoarthritis of right knee [M17.11]  Primary osteoarthritis of right knee [M17.11]  Difficulty walking [R26.2]  Difficulty walking [R26.2]  Admitting Physician: Dr Mathew Sloan  Consults:   Consults     Date and Time Order Name Status Description    10/4/2018 1412 Inpatient Gastroenterology Consult Completed     10/3/2018 1955 Inpatient Cardiology Consult Completed     10/3/2018 1159 Inpatient Internal Medicine Consult            DETAILS OF HOSPITAL STAY:  Patient is a 68 y.o. female was admitted to the floor following the above procedure and underwent an uncomplicated hospital stay from the knee standpoint.  Unfortunately she did have a few postoperative issues.  Initially after we had discussed discharge on postop day 1 she developed some chest pain.  She underwent a cardiac workup and that was negative for any cardiac issues.  Further medical workup ensued and it was noted that she had elevated liver enzymes.  The Tylenol was discontinued from her pain medication and over the next couple days her liver enzymes continue to improve..  Patient did well with physical therapy and was ambulating well without problems.  On the day of discharge the wound was clean, dry and intact and calf was soft and non tender and Homans sign was negative.  Patient was tolerating  without problems.  Patient will be discharged home pending final clearance by internal medicine.  She will follow-up with her primary care physician to recheck her liver enzymes.  Will also need gi FOLLOW UP. Her INR was still low at 1.2 so increase her Coumadin to 6 mg daily and recheck in 2 days.    Condition on Discharge:  Stable    Vital Signs  Temp:  [97.6 °F (36.4 °C)-99.3 °F (37.4 °C)] 98.2 °F (36.8 °C)  Heart Rate:  [60-73]  67  Resp:  [16-18] 18  BP: (106-135)/(59-72) 128/71    LABS:      Admission on 10/01/2018   Component Date Value Ref Range Status   • Hemoglobin 10/02/2018 12.3  11.9 - 15.5 g/dL Final   • Hematocrit 10/02/2018 39.3  35.6 - 45.5 % Final   • Protime 10/02/2018 13.4  11.7 - 14.2 Seconds Final   • INR 10/02/2018 1.04  0.90 - 1.10 Final   • Protime 10/02/2018 13.8  11.7 - 14.2 Seconds Final   • INR 10/02/2018 1.08  0.90 - 1.10 Final   • Troponin T 10/02/2018 <0.010  0.000 - 0.030 ng/mL Final   • Hemoglobin 10/03/2018 11.3* 11.9 - 15.5 g/dL Final   • Hematocrit 10/03/2018 36.1  35.6 - 45.5 % Final   • Protime 10/03/2018 14.9* 11.7 - 14.2 Seconds Final   • INR 10/03/2018 1.19* 0.90 - 1.10 Final   • Troponin T 10/03/2018 <0.010  0.000 - 0.030 ng/mL Final   • Glucose 10/03/2018 123* 65 - 99 mg/dL Final   • BUN 10/03/2018 11  8 - 23 mg/dL Final   • Creatinine 10/03/2018 0.76  0.57 - 1.00 mg/dL Final   • Sodium 10/03/2018 139  136 - 145 mmol/L Final   • Potassium 10/03/2018 3.2* 3.5 - 5.2 mmol/L Final   • Chloride 10/03/2018 99  98 - 107 mmol/L Final   • CO2 10/03/2018 27.7  22.0 - 29.0 mmol/L Final   • Calcium 10/03/2018 9.3  8.6 - 10.5 mg/dL Final   • Total Protein 10/03/2018 7.4  6.0 - 8.5 g/dL Final   • Albumin 10/03/2018 3.80  3.50 - 5.20 g/dL Final   • ALT (SGPT) 10/03/2018 118* 1 - 33 U/L Final   • AST (SGOT) 10/03/2018 103* 1 - 32 U/L Final   • Alkaline Phosphatase 10/03/2018 105  39 - 117 U/L Final   • Total Bilirubin 10/03/2018 1.3* 0.1 - 1.2 mg/dL Final   • eGFR Non African Amer 10/03/2018 75  >60 mL/min/1.73 Final   • Globulin 10/03/2018 3.6  gm/dL Final   • A/G Ratio 10/03/2018 1.1  g/dL Final   • BUN/Creatinine Ratio 10/03/2018 14.5  7.0 - 25.0 Final   • Anion Gap 10/03/2018 12.3  mmol/L Final   • WBC 10/03/2018 11.38* 4.50 - 10.70 10*3/mm3 Final   • RBC 10/03/2018 3.99  3.90 - 5.20 10*6/mm3 Final   • Hemoglobin 10/03/2018 11.9  11.9 - 15.5 g/dL Final   • Hematocrit 10/03/2018 38.1  35.6 - 45.5 % Final   • MCV  10/03/2018 95.5  80.5 - 98.2 fL Final   • MCH 10/03/2018 29.8  26.9 - 32.0 pg Final   • MCHC 10/03/2018 31.2* 32.4 - 36.3 g/dL Final   • RDW 10/03/2018 12.9  11.7 - 13.0 % Final   • RDW-SD 10/03/2018 45.0  37.0 - 54.0 fl Final   • MPV 10/03/2018 10.0  6.0 - 12.0 fL Final   • Platelets 10/03/2018 288  140 - 500 10*3/mm3 Final   • Neutrophil % 10/03/2018 74.6  42.7 - 76.0 % Final   • Lymphocyte % 10/03/2018 13.7* 19.6 - 45.3 % Final   • Monocyte % 10/03/2018 10.5  5.0 - 12.0 % Final   • Eosinophil % 10/03/2018 0.4  0.3 - 6.2 % Final   • Basophil % 10/03/2018 0.4  0.0 - 1.5 % Final   • Immature Grans % 10/03/2018 0.4  0.0 - 0.5 % Final   • Neutrophils, Absolute 10/03/2018 8.49* 1.90 - 8.10 10*3/mm3 Final   • Lymphocytes, Absolute 10/03/2018 1.56  0.90 - 4.80 10*3/mm3 Final   • Monocytes, Absolute 10/03/2018 1.20  0.20 - 1.20 10*3/mm3 Final   • Eosinophils, Absolute 10/03/2018 0.05  0.00 - 0.70 10*3/mm3 Final   • Basophils, Absolute 10/03/2018 0.04  0.00 - 0.20 10*3/mm3 Final   • Immature Grans, Absolute 10/03/2018 0.04* 0.00 - 0.03 10*3/mm3 Final   • Hemoglobin 10/04/2018 11.7* 11.9 - 15.5 g/dL Final   • Hematocrit 10/04/2018 37.3  35.6 - 45.5 % Final   • Protime 10/04/2018 14.6* 11.7 - 14.2 Seconds Final   • INR 10/04/2018 1.16* 0.90 - 1.10 Final   • Glucose 10/04/2018 128* 65 - 99 mg/dL Final   • BUN 10/04/2018 10  8 - 23 mg/dL Final   • Creatinine 10/04/2018 0.69  0.57 - 1.00 mg/dL Final   • Sodium 10/04/2018 138  136 - 145 mmol/L Final   • Potassium 10/04/2018 3.8  3.5 - 5.2 mmol/L Final   • Chloride 10/04/2018 98  98 - 107 mmol/L Final   • CO2 10/04/2018 27.8  22.0 - 29.0 mmol/L Final   • Calcium 10/04/2018 9.3  8.6 - 10.5 mg/dL Final   • Total Protein 10/04/2018 7.4  6.0 - 8.5 g/dL Final   • Albumin 10/04/2018 3.70  3.50 - 5.20 g/dL Final   • ALT (SGPT) 10/04/2018 230* 1 - 33 U/L Final   • AST (SGOT) 10/04/2018 370* 1 - 32 U/L Final   • Alkaline Phosphatase 10/04/2018 227* 39 - 117 U/L Final   • Total Bilirubin  10/04/2018 1.6* 0.1 - 1.2 mg/dL Final   • eGFR Non African Amer 10/04/2018 84  >60 mL/min/1.73 Final   • Globulin 10/04/2018 3.7  gm/dL Final   • A/G Ratio 10/04/2018 1.0  g/dL Final   • BUN/Creatinine Ratio 10/04/2018 14.5  7.0 - 25.0 Final   • Anion Gap 10/04/2018 12.2  mmol/L Final   • Protime 10/05/2018 15.0* 11.7 - 14.2 Seconds Final   • INR 10/05/2018 1.21* 0.90 - 1.10 Final   • Glucose 10/05/2018 112* 65 - 99 mg/dL Final   • BUN 10/05/2018 13  8 - 23 mg/dL Final   • Creatinine 10/05/2018 0.66  0.57 - 1.00 mg/dL Final   • Sodium 10/05/2018 141  136 - 145 mmol/L Final   • Potassium 10/05/2018 3.8  3.5 - 5.2 mmol/L Final   • Chloride 10/05/2018 102  98 - 107 mmol/L Final   • CO2 10/05/2018 27.2  22.0 - 29.0 mmol/L Final   • Calcium 10/05/2018 8.7  8.6 - 10.5 mg/dL Final   • Total Protein 10/05/2018 6.4  6.0 - 8.5 g/dL Final   • Albumin 10/05/2018 3.30* 3.50 - 5.20 g/dL Final   • ALT (SGPT) 10/05/2018 116* 1 - 33 U/L Final   • AST (SGOT) 10/05/2018 62* 1 - 32 U/L Final   • Alkaline Phosphatase 10/05/2018 168* 39 - 117 U/L Final   • Total Bilirubin 10/05/2018 0.9  0.1 - 1.2 mg/dL Final   • eGFR Non African Amer 10/05/2018 89  >60 mL/min/1.73 Final   • Globulin 10/05/2018 3.1  gm/dL Final   • A/G Ratio 10/05/2018 1.1  g/dL Final   • BUN/Creatinine Ratio 10/05/2018 19.7  7.0 - 25.0 Final   • Anion Gap 10/05/2018 11.8  mmol/L Final   • Hepatitis B Surface Ag 10/05/2018 Non-Reactive  Non-Reactive Final   • Hep A IgM 10/05/2018 Non-Reactive  Non-Reactive Final   • Hep B C IgM 10/05/2018 Non-Reactive  Non-Reactive Final   • Hepatitis C Ab 10/05/2018 Non-Reactive  Non-Reactive Final   • Protime 10/06/2018 14.7* 11.7 - 14.2 Seconds Final   • INR 10/06/2018 1.17* 0.90 - 1.10 Final   • Glucose 10/06/2018 122* 65 - 99 mg/dL Final   • BUN 10/06/2018 12  8 - 23 mg/dL Final   • Creatinine 10/06/2018 0.65  0.57 - 1.00 mg/dL Final   • Sodium 10/06/2018 140  136 - 145 mmol/L Final   • Potassium 10/06/2018 3.9  3.5 - 5.2 mmol/L  Final   • Chloride 10/06/2018 101  98 - 107 mmol/L Final   • CO2 10/06/2018 25.7  22.0 - 29.0 mmol/L Final   • Calcium 10/06/2018 9.1  8.6 - 10.5 mg/dL Final   • Total Protein 10/06/2018 6.8  6.0 - 8.5 g/dL Final   • Albumin 10/06/2018 3.30* 3.50 - 5.20 g/dL Final   • ALT (SGPT) 10/06/2018 80* 1 - 33 U/L Final   • AST (SGOT) 10/06/2018 30  1 - 32 U/L Final   • Alkaline Phosphatase 10/06/2018 152* 39 - 117 U/L Final   • Total Bilirubin 10/06/2018 1.1  0.1 - 1.2 mg/dL Final   • eGFR Non African Amer 10/06/2018 90  >60 mL/min/1.73 Final   • Globulin 10/06/2018 3.5  gm/dL Final   • A/G Ratio 10/06/2018 0.9  g/dL Final   • BUN/Creatinine Ratio 10/06/2018 18.5  7.0 - 25.0 Final   • Anion Gap 10/06/2018 13.3  mmol/L Final       No results found.    Discharge Medications     Discharge Medications      New Medications      Instructions Start Date   docusate sodium 100 MG capsule  Notes to patient:  10/6/18   100 mg, Oral, 2 Times Daily      ondansetron 4 MG tablet  Commonly known as:  ZOFRAN   4 mg, Oral, Every 6 Hours PRN      oxyCODONE 10 MG tablet  Commonly known as:  ROXICODONE   10 mg, Oral, Every 4 Hours PRN      polyethylene glycol pack packet  Commonly known as:  MIRALAX  Notes to patient:  10/5/18 PM   17 g, Oral, 2 Times Daily      warfarin 4 MG tablet  Commonly known as:  COUMADIN  Notes to patient:  10/5/18 PM   Take as directed         Continue These Medications      Instructions Start Date   cholecalciferol 1000 units tablet  Commonly known as:  VITAMIN D3  Notes to patient:  10/6/18   1,000 Units, Oral, Daily      hydrochlorothiazide 25 MG tablet  Commonly known as:  HYDRODIURIL  Notes to patient:  10/6/18   25 mg, Oral, Daily      losartan 50 MG tablet  Commonly known as:  COZAAR  Notes to patient:  10/6/18   50 mg, Oral, Daily      metoprolol succinate XL 25 MG 24 hr tablet  Commonly known as:  TOPROL-XL  Notes to patient:  10/5/18   12.5 mg, Oral, Daily      omeprazole 20 MG capsule  Commonly known as:   priLOSEC  Notes to patient:  10/6/18   20 mg, Oral, Every Morning         Stop These Medications    acetaminophen 500 MG tablet  Commonly known as:  TYLENOL     CHLORHEXIDINE GLUCONATE CLOTH EX     mupirocin 2 % nasal ointment  Commonly known as:  BACTROBAN            Activity at Discharge:     Discharge Instructions: Patient is to continue with physical therapy exercises daily and continue working with the physical therapist as ordered. Patient may weight bear as tolerated. Apply ice regularly. Patient may ice for long periods of time as long as ice is not directly on the skin. Patient instructed on frequent calf pumping exercises.  Patient also instructed on incentive spirometer during hospitalization and encouraged to continue to use at home regularly.    *The dressing should be left in place until the suction unit stops functioning (typically 7 days.  If waterproof dressing is intact the patient may shower immediately following discharge. If the dressing becomes disloged or saturated it should be changed. Please refer to the BRANDON information sheet if you have any questions about the dressing.  If you have a home health nurse or therapist they can be contacted to assist with dressing change or repair. You may also call the BRANDON dressing hotline for questions related to the dressing (1-855.713.5284). If there still other problems or questions related to the dressing despite these measures then you can contact Amada at our office [phone].  After 1 week when the BRANDON dressing is removed, the wound should be gently cleaned with antibacterial soap then allowed to dry and covered with a dry sterile dressing. The wound should be covered at all times except while showering.  Patient may change dressings daily and prn using sterile 4x4 and paper tape, and should call if any unusual drainage, redness or swelling.*  Follow up appointment in 2 weeks - patient to call the office at [phone] to schedule.  Patient will be  discharged on coumadin as directed to keep INR 2-3.  Please draw PT / INR in 2 days and call to office during business hours at [phone].    Discharge Diagnosis:    Patient Active Problem List   Diagnosis   • Primary osteoarthritis of right knee   • Chest pain   • Constipation   • Difficulty walking       Follow-up Appointments  Future Appointments  Date Time Provider Department Center   10/16/2018 10:40 AM Nicolle Sloan, MD [facility] None     Additional Instructions for the Follow-ups that You Need to Schedule     Ambulatory Referral to Home Health    As directed      Face to Face Visit Date:  10/4/2018    Follow-up Provider for Plan of Care?:  I will be treating the patient on an ongoing basis.  Please send me the Plan of Care for signature.    Follow-up Provider:  NICOLLE SLOAN [[id number]]    Reason/Clinical Findings:  tka    Describe mobility limitations that make leaving home difficult:  postop    Nursing/Therapeutic Services Requested:  Physical Therapy Skilled Nursing    Skilled nursing orders:  Other (PT and INR to be drawn on Monday with the results called to [phone])    PT orders:  Total joint pathway    Frequency:  1 Week 1         Referral to home health    As directed      PT to see for Home PT protocol. Daily for first week then 3x/ wk for second week. Staples to be removed at f/u appointment in 2 weeks.    Order Comments:  PT to see for Home PT protocol. Daily for first week then 3x/ wk for second week. Staples to be removed at f/u appointment in 2 weeks.     Face to Face Visit Date:  10/2/2018    Follow-up Provider for Plan of Care?:  I will be treating the patient on an ongoing basis.  Please send me the Plan of Care for signature.    Follow-up Provider:  NICOLLE SLOAN [[id number]]    Reason/Clinical Findings:  post op knee replacement    Describe mobility limitations that make leaving home difficult:  pain, swelling, weakness, limited mobility, difficulty ambulating without assistive device     Nursing/Therapeutic Services Requested:  Skilled Nursing Physicial Therapy    Skilled nursing orders:  Other (draw PT/INR in 2 days and call result to [phone])                  Mathew Sloan MD  10/06/18  7:52 AM

## 2018-10-06 NOTE — PLAN OF CARE
Problem: Patient Care Overview  Goal: Plan of Care Review  Outcome: Ongoing (interventions implemented as appropriate)   10/06/18 0308   Coping/Psychosocial   Plan of Care Reviewed With patient   OTHER   Outcome Summary Pt pain controlled with PO pain meds. VSS. Pt ambulating to bathroom with walker and assist of one. Pt plan to dc tomorrow. Will continue to monitor.    Plan of Care Review   Progress no change     Goal: Individualization and Mutuality  Outcome: Ongoing (interventions implemented as appropriate)    Goal: Discharge Needs Assessment  Outcome: Ongoing (interventions implemented as appropriate)    Goal: Interprofessional Rounds/Family Conf  Outcome: Ongoing (interventions implemented as appropriate)      Problem: Fall Risk (Adult)  Goal: Absence of Fall  Outcome: Ongoing (interventions implemented as appropriate)      Problem: Knee Arthroplasty (Total, Partial) (Adult)  Goal: Signs and Symptoms of Listed Potential Problems Will be Absent, Minimized or Managed (Knee Arthroplasty)  Outcome: Ongoing (interventions implemented as appropriate)      Problem: Hypertensive Disease/Crisis (Arterial) (Adult)  Goal: Signs and Symptoms of Listed Potential Problems Will be Absent, Minimized or Managed (Hypertensive Disease/Crisis)  Outcome: Ongoing (interventions implemented as appropriate)

## 2018-10-06 NOTE — PLAN OF CARE
Problem: Patient Care Overview  Goal: Plan of Care Review  Outcome: Ongoing (interventions implemented as appropriate)   10/06/18 1336   Coping/Psychosocial   Plan of Care Reviewed With patient   OTHER   Outcome Summary VSS. Dressing c/d/i. Pain well managed w/ po meds. Pt. feels ready for d/c home today w/ HH.   Plan of Care Review   Progress improving       Problem: Fall Risk (Adult)  Goal: Absence of Fall  Outcome: Ongoing (interventions implemented as appropriate)   10/06/18 1336   Fall Risk (Adult)   Absence of Fall achieves outcome       Problem: Hypertensive Disease/Crisis (Arterial) (Adult)  Goal: Signs and Symptoms of Listed Potential Problems Will be Absent, Minimized or Managed (Hypertensive Disease/Crisis)  Outcome: Ongoing (interventions implemented as appropriate)

## 2018-10-06 NOTE — PROGRESS NOTES
[facility]  Inpatient Progress Note    Reason for Follow Up:  Abnormal liver tests    Subjective     Interval History:   No significant 24-hour events she would like to go home    Current Facility-Administered Medications:   •  bisacodyl (DULCOLAX) suppository 10 mg, 10 mg, Rectal, Daily PRN, Verona Mckeon, APRN  •  docusate sodium (COLACE) capsule 100 mg, 100 mg, Oral, BID, Verona Mckeon, APRN, 100 mg at 10/06/18 0841  •  hydrochlorothiazide (HYDRODIURIL) tablet 12.5 mg, 12.5 mg, Oral, Daily, Isaak Khalil MD, 12.5 mg at 10/06/18 0841  •  ketorolac (TORADOL) injection 30 mg, 30 mg, Intravenous, Q6H PRN, Mathew Sloan MD  •  losartan (COZAAR) tablet 50 mg, 50 mg, Oral, Daily, Verona Mckeon, APRN, 50 mg at 10/06/18 0841  •  melatonin tablet 3 mg, 3 mg, Oral, Nightly PRN, Verona Mckeon, APRN  •  metoprolol succinate XL (TOPROL-XL) 24 hr tablet 12.5 mg, 12.5 mg, Oral, Daily, Verona Mckeon, APRN, 12.5 mg at 10/06/18 0841  •  ondansetron (ZOFRAN) tablet 4 mg, 4 mg, Oral, Q6H PRN **OR** ondansetron ODT (ZOFRAN-ODT) disintegrating tablet 4 mg, 4 mg, Oral, Q6H PRN **OR** ondansetron (ZOFRAN) injection 4 mg, 4 mg, Intravenous, Q6H PRN, Verona Mckeon, APRN  •  oxyCODONE (ROXICODONE) immediate release tablet 10 mg, 10 mg, Oral, Q4H PRN, Isaak Khalil MD, 10 mg at 10/06/18 1313  •  pantoprazole (PROTONIX) EC tablet 40 mg, 40 mg, Oral, QAM, Verona Mckeon, APRN, 40 mg at 10/06/18 0614  •  polyethylene glycol 3350 powder (packet), 17 g, Oral, TID, Isaak Khalil MD, 17 g at 10/06/18 0841  •  warfarin (COUMADIN) tablet 5 mg, 5 mg, Oral, Daily, Mathew Sloan MD, 5 mg at 10/05/18 1710    Facility-Administered Medications Ordered in Other Encounters:   •  mupirocin (BACTROBAN) 2 % nasal ointment, , Nasal, BID, Mathew Sloan MD  Review of Systems:    She endorses a bit of weakness and constipation all other systems reviewed and negative    Objective     Vital  Signs  Temp:  [97.5 °F (36.4 °C)-99.3 °F (37.4 °C)] 97.5 °F (36.4 °C)  Heart Rate:  [60-93] 71  Resp:  [16-20] 20  BP: (106-130)/(59-85) 124/70  Body mass index is 36.93 kg/m².    Intake/Output Summary (Last 24 hours) at 10/06/18 1346  Last data filed at 10/06/18 0821   Gross per 24 hour   Intake              360 ml   Output                0 ml   Net              360 ml     I/O this shift:  In: 360 [P.O.:360]  Out: -      Physical Exam:   General: patient awake, alert and cooperative   Eyes: Normal lids and lashes, no scleral icterus   Neck: supple, normal ROM   Skin: warm and dry, not jaundiced   Cardiovascular: regular rhythm and rate, no murmurs auscultated   Pulm: clear to auscultation bilaterally, regular and unlabored   Abdomen: soft, nontender, nondistended; normal bowel sounds   Rectal: deferred   Extremities: no rash or edema   Psychiatric: Normal mood and behavior; memory intact     Results Review:     I reviewed the patient's new clinical results.      Results from last 7 days  Lab Units 10/04/18  0458 10/03/18  1409 10/03/18  0322   WBC 10*3/mm3  --  11.38*  --    HEMOGLOBIN g/dL 11.7* 11.9 11.3*   HEMATOCRIT % 37.3 38.1 36.1   PLATELETS 10*3/mm3  --  288  --        Results from last 7 days  Lab Units 10/06/18  0414 10/05/18  0448 10/04/18  0458   SODIUM mmol/L 140 141 138   POTASSIUM mmol/L 3.9 3.8 3.8   CHLORIDE mmol/L 101 102 98   CO2 mmol/L 25.7 27.2 27.8   BUN mg/dL 12 13 10   CREATININE mg/dL 0.65 0.66 0.69   CALCIUM mg/dL 9.1 8.7 9.3   BILIRUBIN mg/dL 1.1 0.9 1.6*   ALK PHOS U/L 152* 168* 227*   ALT (SGPT) U/L 80* 116* 230*   AST (SGOT) U/L 30 62* 370*   GLUCOSE mg/dL 122* 112* 128*       Results from last 7 days  Lab Units 10/06/18  0414 10/05/18  0448 10/04/18  0458   INR  1.17* 1.21* 1.16*     No results found for: LIPASE    Radiology:  US Liver   Final Result       1. Mildly increased liver echotexture may reflect hepatic steatosis.       This report was finalized on 10/5/2018 12:09 AM by   Liya Caldwell M.D.          XR Knee 1 or 2 View Right   Final Result          Assessment/Plan     Patient Active Problem List   Diagnosis   • Primary osteoarthritis of right knee   • Chest pain   • Constipation   • Difficulty walking     Impression  1.  Elevated liver enzymes: Improvement today, findings of steatosis on her imaging.  Negative hepatitis panel.  Suspect secondary to medications or postoperative with underlying hepatic steatosis given prior normal labwork     2.  Constipation: Opioid induced: Improved     Plan   repeat a CMP in a few weeks with her PCP     If liver elevations persist in the outpt setting, will need further serologic workup and can follow up with Dr Miles in the office     Continue bowel regimen, limit opioids if able    Agree with Dr. Khalil, she can go home if everyone agrees    I discussed the patients findings and my recommendations with patient.    Aleksandr August MD

## 2018-10-06 NOTE — THERAPY DISCHARGE NOTE
Acute Care - Physical Therapy Treatment Note/Discharge  [facility]     Patient Name: Chanell Tafoya  : 1950  MRN: [id number]  Today's Date: 10/6/2018  Onset of Illness/Injury or Date of Surgery: 10/01/18  Date of Referral to PT: 10/01/18  Referring Physician: Mathew Fontanez    Admit Date: 10/1/2018    Visit Dx:    ICD-10-CM ICD-9-CM   1. Difficulty walking R26.2 719.7   2. Primary osteoarthritis of right knee M17.11 715.16     Patient Active Problem List   Diagnosis   • Primary osteoarthritis of right knee   • Chest pain   • Constipation   • Difficulty walking       Physical Therapy Education     Title: PT OT SLP Therapies (Done)     Topic: Physical Therapy (Done)     Point: Mobility training (Done)    Learning Progress Summary     Learner Status Readiness Method Response Comment Documented by    Patient Done Acceptance NESSA FLEMINGNR  MS 10/06/18 1152     Done Acceptance NESSA FLEMING VUNR  MS 10/01/18 1148          Point: Home exercise program (Done)    Learning Progress Summary     Learner Status Readiness Method Response Comment Documented by    Patient Done Acceptance ED VU,NR  MS 10/06/18 1152     Done Acceptance ED VU,NR  MS 10/01/18 1148          Point: Body mechanics (Done)    Learning Progress Summary     Learner Status Readiness Method Response Comment Documented by    Patient Done Acceptance NESSA FLEMING VUNR  MS 10/06/18 1152     Done Acceptance ED VU,NR  MS 10/01/18 1148          Point: Precautions (Done)    Learning Progress Summary     Learner Status Readiness Method Response Comment Documented by    Patient Done Acceptance NESSA FLEMING NR  MS 10/06/18 1152     Done Acceptance LONNIED VUNR  MS 10/01/18 1148                      User Key     Initials Effective Dates Name Provider Type Discipline    MS 18 -  Alan Godinez PT Physical Therapist PT                Therapy Treatment        Rehabilitation Treatment Summary     Row Name 10/06/18 1150             Treatment Time/Intention    Discipline physical  therapist  -MS      Document Type therapy note (daily note);discharge treatment  -MS      Subjective Information complains of;pain  -MS      Mode of Treatment physical therapy;individual therapy  -MS      Patient Effort good  -MS      Recorded by [MS] Alan Godinez, PT 10/06/18 1152      Row Name 10/06/18 1150             Mobility Assessment/Intervention    Right Lower Extremity (Weight-bearing Status) weight-bearing as tolerated (WBAT)  -MS      Recorded by [MS] Alan Godinez, PT 10/06/18 1152      Row Name 10/06/18 1150             Bed Mobility Assessment/Treatment    Comment (Bed Mobility) Up in chair  -MS      Recorded by [MS] Alan Godinez, PT 10/06/18 1152      Row Name 10/06/18 1150             Sit-Stand Transfer    Sit-Stand [place] (Transfers) independent  -MS      Assistive Device (Sit-Stand Transfers) walker, front-wheeled  -MS      Recorded by [MS] Alan Godinez, PT 10/06/18 1152      Row Name 10/06/18 1150             Stand-Sit Transfer    Stand-Sit [place] (Transfers) independent  -MS      Assistive Device (Stand-Sit Transfers) walker, front-wheeled  -MS      Recorded by [MS] Alan Godinez, PT 10/06/18 1152      Row Name 10/06/18 1150             Gait/Stairs Assessment/Training    [place] Level (Gait) independent  -MS      Assistive Device (Gait) walker, front-wheeled  -MS      Distance in Feet (Gait) 100 feet  -MS      Pattern (Gait) step-through  -MS      Deviations/Abnormal Patterns (Gait) antalgic  -MS      Recorded by [MS] Alan Godinez, PT 10/06/18 1152      Row Name 10/06/18 1150             General ROM    RT Lower Ext Rt Knee Extension/Flexion  -MS      Recorded by [MS] Alan Godinez, PT 10/06/18 1152      Row Name 10/06/18 1150             Right Lower Ext    Rt Knee Extension/Flexion AROM (7, 90)  -MS      Recorded by [MS] Alan Godinez, PT 10/06/18 1152      Row Name 10/06/18 1150             Therapeutic Exercise    Comment (Therapeutic Exercise)  Right TKR protocol x 30 reps completed  -MS      Recorded by [MS] Maty Godinezroxanna ANDERSON, PT 10/06/18 1152      Row Name 10/06/18 1150             Positioning and Restraints    Pre-Treatment Position sitting in chair/recliner  -MS      Post Treatment Position chair  -MS      In Chair notified nsg;reclined;sitting;call light within reach;encouraged to call for assist   Ice pack to Right knee.  -MS      Recorded by [MS] Alan Godinez MONICA, PT 10/06/18 1152      Row Name 10/06/18 1150             Pain Scale: Numbers Pre/Post-Treatment    Pain Scale: Numbers, Pretreatment 5/10  -MS      Pain Location - Side Right  -MS      Pain Location knee  -MS      Pain Intervention(s) Medication (See MAR);Cold applied;Repositioned;Elevated  -MS      Recorded by [MS] Gretchen Alan ANDERSON, PT 10/06/18 1152      Row Name                Wound 10/01/18 0835 Right knee incision    Wound - Properties Group Date first assessed: 10/01/18 [SB] Time first assessed: 0835 [SB] Side: Right [SB] Location: knee [SB] Type: incision [SB] Recorded by:  [SB] Cherie Matthews RN 10/01/18 0835      User Key  (r) = Recorded By, (t) = Taken By, (c) = Cosigned By    Initials Name Effective Dates Discipline    Alan Kevin, PT 04/03/18 -  PT    SB Cherie Matthews, RN 06/16/16 -  Nurse        Wound 10/01/18 0835 Right knee incision (Active)   Dressing Appearance dry;intact;no drainage 10/6/2018  8:41 AM   Closure JANETT 10/6/2018  8:41 AM   Base dressing in place, unable to visualize 10/6/2018  8:41 AM   Drainage Amount scant 10/6/2018  8:41 AM       PT Recommendation and Plan  Anticipated Discharge Disposition (PT): home with assist, home with home health  Planned Therapy Interventions (PT Eval): balance training, bed mobility training, gait training, home exercise program, patient/family education, postural re-education, ROM (range of motion), stair training, strengthening, transfer training  Therapy Frequency (PT Clinical Impression): 2  times/day  Outcome Summary/Treatment Plan (PT)  Anticipated Discharge Disposition (PT): home with assist, home with home health  Plan of Care Reviewed With: patient  Progress: improving  Outcome Summary: Improved tolerance to functional activity this AM with an increase in gait distance and progression of ther. ex. program.          Outcome Measures     Row Name 10/06/18 1100 10/05/18 1100 10/04/18 1600       How much help from another person do you currently need...    Turning from your back to your side while in flat bed without using bedrails? 4  -MS 4  -CW 4  -KH    Moving from lying on back to sitting on the side of a flat bed without bedrails? 4  -MS 4  -CW 4  -KH    Moving to and from a bed to a chair (including a wheelchair)? 4  -MS 4  -CW 4  -KH    Standing up from a chair using your arms (e.g., wheelchair, bedside chair)? 4  -MS 4  -CW 4  -KH    Climbing 3-5 steps with a railing? 3  -MS 4  -CW 4  -KH    To walk in hospital room? 4  -MS 4  -CW 4  -KH    AM-PAC 6 Clicks Score 23  -MS 24  -CW 24  -KH       Functional Assessment    Outcome Measure Options AM-PAC 6 Clicks Basic Mobility (PT)  -MS AM-PAC 6 Clicks Basic Mobility (PT)  -CW AM-PAC 6 Clicks Basic Mobility (PT)  -KH    Row Name 10/04/18 1200             How much help from another person do you currently need...    Turning from your back to your side while in flat bed without using bedrails? 4  -KH      Moving from lying on back to sitting on the side of a flat bed without bedrails? 4  -KH      Moving to and from a bed to a chair (including a wheelchair)? 4  -KH      Standing up from a chair using your arms (e.g., wheelchair, bedside chair)? 4  -KH      Climbing 3-5 steps with a railing? 3  -KH      To walk in hospital room? 4  -KH      AM-PAC 6 Clicks Score 23  -KH         Functional Assessment    Outcome Measure Options AM-PAC 6 Clicks Basic Mobility (PT)  -KH        User Key  (r) = Recorded By, (t) = Taken By, (c) = Cosigned By    Initials Name  Provider Type    America Canales, PT Physical Therapist    Alan Kevin, PT Physical Therapist    CW Rigo Wheat, PTA Physical Therapy Assistant           Time Calculation:         PT Charges     Row Name 10/06/18 1153             Time Calculation    Start Time 1135  -MS      Stop Time 1152  -MS      Time Calculation (min) 17 min  -MS      PT Received On 10/06/18  -MS         Time Calculation- PT    Total Timed Code Minutes- PT 12 minute(s)  -MS        User Key  (r) = Recorded By, (t) = Taken By, (c) = Cosigned By    Initials Name Provider Type    MS Godinez Alan ANDERSON, PT Physical Therapist        Therapy Suggested Charges     Code   Minutes Charges    None             Therapy Charges for Today     Code Description Service Date Service Provider Modifiers Qty    [id number] HC PT THER PROC EA 15 MIN 10/6/2018 Alan Godinez, PT GP 1          PT G-Codes  PT Professional Judgement Used?: Yes  Outcome Measure Options: AM-PAC 6 Clicks Basic Mobility (PT)  AM-PAC 6 Clicks Score: 23  Functional Limitation: Mobility: Walking and moving around  Mobility: Walking and Moving Around Current Status (): At least 20 percent but less than 40 percent impaired, limited or restricted  Mobility: Walking and Moving Around Goal Status (): 0 percent impaired, limited or restricted    PT Discharge Summary  Anticipated Discharge Disposition (PT): home with assist, home with home health  Reason for Discharge: Discharge from facility  Outcomes Achieved: Refer to plan of care for updates on goals achieved  Discharge Destination: Home with assist, Home with home health    Alan Godinez, PT  10/6/2018

## 2018-10-06 NOTE — PLAN OF CARE
Problem: Patient Care Overview  Goal: Plan of Care Review   10/06/18 1152   Coping/Psychosocial   Plan of Care Reviewed With patient   OTHER   Outcome Summary Improved tolerance to functional activity this AM with an increase in gait distance and progression of ther. ex. program.   Plan of Care Review   Progress improving

## 2018-10-07 ENCOUNTER — READMISSION MANAGEMENT (OUTPATIENT)
Dept: CALL CENTER | Facility: HOSPITAL | Age: 68
End: 2018-10-07

## 2018-10-07 NOTE — OUTREACH NOTE
Prep Survey      Responses   Facility patient discharged from?  [place]   Is patient eligible?  Yes   Discharge diagnosis  Primary osteoarthritis of right knee TOTAL KNEE ARTHROPLASTY   Does the patient have one of the following disease processes/diagnoses(primary or secondary)?  Total Joint Replacement   Does the patient have Home health ordered?  Yes   What is the Home health agency?   [facility]   Is there a DME ordered?  No   Prep survey completed?  Yes          Marixa Mueller RN

## 2018-10-08 ENCOUNTER — TELEPHONE (OUTPATIENT)
Dept: ORTHOPEDIC SURGERY | Facility: CLINIC | Age: 68
End: 2018-10-08

## 2018-10-08 NOTE — TELEPHONE ENCOUNTER
PT is 15.2  INR is 1.3   Patient is taking 6mg Coumadin in the home - patient is taking 1 tablet every evening.

## 2018-10-09 RX ORDER — OXYCODONE HYDROCHLORIDE 10 MG/1
10 TABLET ORAL EVERY 4 HOURS PRN
Qty: 15 TABLET | Refills: 0 | Status: SHIPPED | OUTPATIENT
Start: 2018-10-09 | End: 2018-10-11 | Stop reason: SDUPTHER

## 2018-10-09 NOTE — TELEPHONE ENCOUNTER
ProTime today was 15.2 INR is 1.3.  Will increase her Coumadin to 9 mg daily and recheck her ProTime again on Thursday per RBB

## 2018-10-10 ENCOUNTER — READMISSION MANAGEMENT (OUTPATIENT)
Dept: CALL CENTER | Facility: HOSPITAL | Age: 68
End: 2018-10-10

## 2018-10-11 DIAGNOSIS — Z96.651 STATUS POST TOTAL KNEE REPLACEMENT, RIGHT: Primary | ICD-10-CM

## 2018-10-11 RX ORDER — OXYCODONE HYDROCHLORIDE 10 MG/1
10 TABLET ORAL EVERY 4 HOURS PRN
Qty: 40 TABLET | Refills: 0 | OUTPATIENT
Start: 2018-10-11

## 2018-10-11 RX ORDER — OXYCODONE HYDROCHLORIDE 10 MG/1
10 TABLET ORAL EVERY 4 HOURS PRN
Qty: 40 TABLET | Refills: 0 | OUTPATIENT
Start: 2018-10-11 | End: 2018-10-20

## 2018-10-11 NOTE — TELEPHONE ENCOUNTER
ProTime today is 17.1 INR is 1.4.  Patient has 6 mg tablets of Coumadin at home.  Will have her take a total of 2 tablets today for total of 12 mg of then tomorrow resume her Coumadin at 9 mg daily.  Will recheck her ProTime again on Monday per RBB

## 2018-10-11 NOTE — TELEPHONE ENCOUNTER
Call return to home health.  I left a message for her to contact me the office regarding the patient's Coumadin.  Her time today is 17.1 INR is 1.4.

## 2018-10-11 NOTE — TELEPHONE ENCOUNTER
Status post right total knee replacement 10/01/18    Patient unable to take Tylenol due to increased liver enzymes.     RBB out of office today and tomorrow.

## 2018-10-11 NOTE — TELEPHONE ENCOUNTER
Status post right total knee replacement 10/01/18     Patient unable to take Tylenol due to increased liver enzymes.      RBB out of office today and tomorrow.    Sending to JALEJANDRO to review on Friday morning 10/12 as patient is not able to come  the RX from the office and [facility] is not able to e-prescribe.  No other MD's in the office this afternoon.   Patient has enough medication to last her today and can wait until Friday morning for a new RX.

## 2018-10-12 RX ORDER — OXYCODONE HYDROCHLORIDE 10 MG/1
10 TABLET ORAL EVERY 4 HOURS PRN
Qty: 40 TABLET | Refills: 0 | Status: SHIPPED | OUTPATIENT
Start: 2018-10-12 | End: 2018-10-16 | Stop reason: SDUPTHER

## 2018-10-15 ENCOUNTER — TELEPHONE (OUTPATIENT)
Dept: ORTHOPEDIC SURGERY | Facility: CLINIC | Age: 68
End: 2018-10-15

## 2018-10-15 NOTE — TELEPHONE ENCOUNTER
ProTime today is 26.4 INR is 2.2.  Will continue her Coumadin at 9 mg daily and recheck her ProTime again on Thursday per RBB

## 2018-10-16 ENCOUNTER — OFFICE VISIT (OUTPATIENT)
Dept: ORTHOPEDIC SURGERY | Facility: CLINIC | Age: 68
End: 2018-10-16

## 2018-10-16 VITALS — BODY MASS INDEX: 34.72 KG/M2 | HEIGHT: 64 IN | TEMPERATURE: 97.6 F | WEIGHT: 203.4 LBS

## 2018-10-16 DIAGNOSIS — Z96.651 STATUS POST RIGHT KNEE REPLACEMENT: Primary | ICD-10-CM

## 2018-10-16 PROCEDURE — 99024 POSTOP FOLLOW-UP VISIT: CPT | Performed by: ORTHOPAEDIC SURGERY

## 2018-10-16 RX ORDER — OXYCODONE HYDROCHLORIDE 10 MG/1
10 TABLET ORAL EVERY 4 HOURS PRN
Qty: 30 TABLET | Refills: 0 | Status: SHIPPED | OUTPATIENT
Start: 2018-10-16 | End: 2018-10-25 | Stop reason: SDUPTHER

## 2018-10-16 NOTE — PROGRESS NOTES
Chanell Tafoya : 1950 MRN: [id number] DATE: 10/16/2018    DIAGNOSIS: 2 week follow up right total knee      SUBJECTIVE:Patient returns today for 2 week follow up of right total knee replacement. Patient reports doing well with no unusual complaints. Appears to be progressing appropriately.    OBJECTIVE:   Exam:. The incision is healing appropriately. No sign of infection. Range of motion is progressing as expected. The calf is soft and nontender with a negative Homans sign.    ASSESSMENT: 2 week status post right knee replacement.    PLAN: 1) Staples removed and steri strips applied   2) Order given for PT   3) Discontinue JUSTEN hose   4) Continue ice PRN   5) warfarin for 1 month   6) Follow up in 6 weeks with repeat Xrays of right knee (3views)   7) her liver enzymes were elevated while hospitalized and it was believed to be due to the Tylenol exposure.  She was asking me about resuming Tylenol rather than pain medication but I will defer to her primary care physician whether or not she should start that.  She will take this up with Dr. Lester Sloan MD  10/16/2018

## 2018-10-17 ENCOUNTER — READMISSION MANAGEMENT (OUTPATIENT)
Dept: CALL CENTER | Facility: HOSPITAL | Age: 68
End: 2018-10-17

## 2018-10-17 NOTE — OUTREACH NOTE
Total Joint Week 2 Survey      Responses   Facility patient discharged from?  [place]   Does the patient have one of the following disease processes/diagnoses(primary or secondary)?  Total Joint Replacement   Joint surgery performed?  Knee   Week 2 attempt successful?  Yes   Call start time  1008   Call end time  1014   Has the patient been back in either the hospital or Emergency Department since discharge?  No   Discharge diagnosis  Primary osteoarthritis of right knee TOTAL KNEE ARTHROPLASTY   Does the patient have all medications related to this admission filled (includes all antibiotics, pain medications, etc.)  Yes   Is the patient taking all medications as directed (includes completed medication regime)?  Yes   Is the patient able to teach back alternate methods of pain control?  Ice, Knee-elevation/no pillow under knee, Reposition, Correct alignment, Short, frequent activity   Does the patient have a follow up appointment with their surgeon?  Yes   Has the patient kept scheduled appointments due by today?  Yes   What is the Home health agency?   [facility]   Has home health visited the patient within 72 hours of discharge?  Yes   Psychosocial issues?  No   Has the patient began therapy sessions (either in the home or as an out patient)?  Yes   Does the patient have a wound vac in place?  N/A   Has the patient fallen since discharge?  No   Comments  Staples removed at appt yesterday. Had HH PT today.   Did the patient receive a copy of their discharge instructions?  Yes   Nursing interventions  Reviewed instructions with patient, Educated on MyChart   What is the patient's perception of their functional status since discharge?  Improving   Is the patient able to teach back signs and symptoms of infection?  Temp >100.4 for 24h or longer, Incisional drainage, Blisters around incision, Increased swelling or redness around incision (not associated with surgical edema), Severe discomfort or pain,  Changes in mobility, Shortness of breath or chest pain   Is the patient able to teach back how to prevent infection?  Check incision daily, Wash hands before and after touching incision, Keep incision covered if drainage, No lotion or creams, No tub baths, hot tub or swimming, Eat well-balanced diet   Is the patient able to teach back signs and symptoms of DVT?  Redness in calf, Area hot to touch, Shortness of breath or chest pain, Swelling in calf, Severe pain in calf   Is the patient able to teach back home safety measures?  Ability to shower, Accessibility to necessary areas in home, Modifications to reach items, Modifications with ADLs such as dressing, cooking, toileting   Did the patient implement home safety suggestions from pre-surgery classes if attended?  Yes   Is the patient/caregiver able to teach back the hierarchy of who to call/visit for symptoms/problems? PCP, Specialist, Home health nurse, Urgent Care, ED, 911  Yes   Week 2 call completed?  Yes          Jarred Lazcano RN

## 2018-10-18 ENCOUNTER — TELEPHONE (OUTPATIENT)
Dept: ORTHOPEDIC SURGERY | Facility: CLINIC | Age: 68
End: 2018-10-18

## 2018-10-18 DIAGNOSIS — Z51.81 ENCOUNTER FOR MONITORING COUMADIN THERAPY: Primary | ICD-10-CM

## 2018-10-18 DIAGNOSIS — Z79.01 ENCOUNTER FOR MONITORING COUMADIN THERAPY: Primary | ICD-10-CM

## 2018-10-18 NOTE — TELEPHONE ENCOUNTER
Have left a message with the home health nurse.  ProTime today is 33.6 INR is 2.8.  Will decrease her Coumadin to 6 mg daily and recheck her ProTime again on Monday.  Patient is being discharged from [facility] and would like to have her outpatient pro times drawn at [facility].  Orders have been faxed to begin next Monday per RBB

## 2018-10-22 ENCOUNTER — TELEPHONE (OUTPATIENT)
Dept: ORTHOPEDIC SURGERY | Facility: CLINIC | Age: 68
End: 2018-10-22

## 2018-10-22 DIAGNOSIS — Z51.81 ENCOUNTER FOR MONITORING COUMADIN THERAPY: Primary | ICD-10-CM

## 2018-10-22 DIAGNOSIS — Z79.01 ENCOUNTER FOR MONITORING COUMADIN THERAPY: Primary | ICD-10-CM

## 2018-10-22 NOTE — TELEPHONE ENCOUNTER
Patient is having her pro times drawn at [place].  I fax the order originally on Friday however they're saying they don't have the order.  Have refax the order to [phone]

## 2018-10-23 ENCOUNTER — TELEPHONE (OUTPATIENT)
Dept: ORTHOPEDIC SURGERY | Facility: CLINIC | Age: 68
End: 2018-10-23

## 2018-10-23 RX ORDER — WARFARIN SODIUM 6 MG/1
6 TABLET ORAL NIGHTLY
Qty: 30 TABLET | Refills: 0 | Status: SHIPPED | OUTPATIENT
Start: 2018-10-23 | End: 2018-11-22

## 2018-10-23 NOTE — TELEPHONE ENCOUNTER
Protime from yesterday was 26.4, INR was 2.6.  Will continue her Coumadin at 6mg daily.  Will recheck her protime again on Monday.  Have e-prescribed a refill on Coumadin 6mg, #30, take 1 tab po daily.  It was sent to  [facility] at [phone], per RBB

## 2018-10-25 DIAGNOSIS — Z96.651 STATUS POST TOTAL KNEE REPLACEMENT, RIGHT: Primary | ICD-10-CM

## 2018-10-25 RX ORDER — OXYCODONE HYDROCHLORIDE 10 MG/1
10 TABLET ORAL EVERY 4 HOURS PRN
Qty: 30 TABLET | Refills: 0 | Status: SHIPPED | OUTPATIENT
Start: 2018-10-25 | End: 2018-11-01 | Stop reason: SDUPTHER

## 2018-10-25 NOTE — TELEPHONE ENCOUNTER
Called patient to advise that RX was approved and signed and it went to her [facility] pharmacy electronically

## 2018-11-01 ENCOUNTER — TELEPHONE (OUTPATIENT)
Dept: ORTHOPEDIC SURGERY | Facility: CLINIC | Age: 68
End: 2018-11-01

## 2018-11-01 DIAGNOSIS — Z96.651 STATUS POST TOTAL KNEE REPLACEMENT, RIGHT: ICD-10-CM

## 2018-11-01 RX ORDER — OXYCODONE HYDROCHLORIDE 10 MG/1
10 TABLET ORAL EVERY 4 HOURS PRN
Qty: 20 TABLET | Refills: 0 | Status: SHIPPED | OUTPATIENT
Start: 2018-11-01 | End: 2018-11-09 | Stop reason: SDUPTHER

## 2018-11-01 NOTE — TELEPHONE ENCOUNTER
ProTime from Monday was 13.8 INR is 1.3.  I did verify that the patient has continued to take her Coumadin at 6 mg daily.  The only change in her medications as the patient has been taking some Prune juice for constipation) does have some vitamin K in it.  Will increase her Coumadin to 9 mg daily (1-1/2 tablets) and will recheck her ProTime again on Monday per RBB

## 2018-11-02 ENCOUNTER — READMISSION MANAGEMENT (OUTPATIENT)
Dept: CALL CENTER | Facility: HOSPITAL | Age: 68
End: 2018-11-02

## 2018-11-02 NOTE — OUTREACH NOTE
Total Joint Month 1 Survey      Responses   Facility patient discharged from?  [place]   Does the patient have one of the following disease processes/diagnoses(primary or secondary)?  Total Joint Replacement   Joint surgery performed?  Knee   Month 1 attempt successful?  No   Unsuccessful attempts  Attempt 1          Cydney Drake RN

## 2018-11-05 ENCOUNTER — READMISSION MANAGEMENT (OUTPATIENT)
Dept: CALL CENTER | Facility: HOSPITAL | Age: 68
End: 2018-11-05

## 2018-11-05 NOTE — OUTREACH NOTE
Total Joint Month 1 Survey      Responses   Facility patient discharged from?  [place]   Does the patient have one of the following disease processes/diagnoses(primary or secondary)?  Total Joint Replacement   Joint surgery performed?  Knee   Month 1 attempt successful?  Yes   Call start time  1120   Call end time  1124   Has the patient been back in either the hospital or Emergency Department since discharge?  No   Discharge diagnosis  Primary osteoarthritis of right knee TOTAL KNEE ARTHROPLASTY   Is the patient taking all medications as directed (includes completed medication regime)?  Yes   Is the patient able to teach back alternate methods of pain control?  Ice, Knee-elevation/no pillow under knee, Reposition, Correct alignment, Short, frequent activity   Has the patient kept scheduled appointments due by today?  Yes   Has the patient fallen since discharge?  No   What is the patient's perception of their functional status since discharge?  Improving   Additional teach back comments  Spoke with pateint and she stated she is doing well from knee sx. She is having some hip pain. States she had a good check up with  Still taking Coumadin and having INR check. Pt was at lab for lab draw and had to get off of phone.    Month 1 call completed?  Yes          Rivka Conklin RN

## 2018-11-06 ENCOUNTER — TELEPHONE (OUTPATIENT)
Dept: ORTHOPEDIC SURGERY | Facility: CLINIC | Age: 68
End: 2018-11-06

## 2018-11-06 NOTE — TELEPHONE ENCOUNTER
ProTime from yesterday was 18.4 INR is 1.8.  Her take Coumadin 12 mg today only and then tomorrow resume her Coumadin at 9 mg daily through Sunday of this week and then DC her Coumadin and any further pro times per RBB

## 2018-11-08 DIAGNOSIS — Z96.651 STATUS POST TOTAL KNEE REPLACEMENT, RIGHT: ICD-10-CM

## 2018-11-08 DIAGNOSIS — R94.5 LIVER FUNCTION ABNORMALITY: Primary | ICD-10-CM

## 2018-11-09 RX ORDER — OXYCODONE HYDROCHLORIDE 10 MG/1
10 TABLET ORAL EVERY 4 HOURS PRN
Qty: 20 TABLET | Refills: 0 | Status: SHIPPED | OUTPATIENT
Start: 2018-11-09 | End: 2019-06-19

## 2018-11-09 NOTE — TELEPHONE ENCOUNTER
Per RBB, it is okay to refill the Percocet medication and he also states that we will order the CMP labs for the patient to check her liver enzymes.     Called patient with this information.

## 2018-11-13 ENCOUNTER — LAB (OUTPATIENT)
Dept: LAB | Facility: HOSPITAL | Age: 68
End: 2018-11-13

## 2018-11-13 DIAGNOSIS — R94.5 LIVER FUNCTION ABNORMALITY: ICD-10-CM

## 2018-11-13 LAB
ALBUMIN SERPL-MCNC: 4.1 G/DL (ref 3.5–5.2)
ALBUMIN/GLOB SERPL: 1.1 G/DL
ALP SERPL-CCNC: 104 U/L (ref 39–117)
ALT SERPL W P-5'-P-CCNC: 12 U/L (ref 1–33)
ANION GAP SERPL CALCULATED.3IONS-SCNC: 11.6 MMOL/L
AST SERPL-CCNC: 13 U/L (ref 1–32)
BILIRUB SERPL-MCNC: 0.6 MG/DL (ref 0.1–1.2)
BUN BLD-MCNC: 16 MG/DL (ref 8–23)
BUN/CREAT SERPL: 20 (ref 7–25)
CALCIUM SPEC-SCNC: 9.8 MG/DL (ref 8.6–10.5)
CHLORIDE SERPL-SCNC: 99 MMOL/L (ref 98–107)
CO2 SERPL-SCNC: 28.4 MMOL/L (ref 22–29)
CREAT BLD-MCNC: 0.8 MG/DL (ref 0.57–1)
GFR SERPL CREATININE-BSD FRML MDRD: 71 ML/MIN/1.73
GLOBULIN UR ELPH-MCNC: 3.7 GM/DL
GLUCOSE BLD-MCNC: 85 MG/DL (ref 65–99)
POTASSIUM BLD-SCNC: 3.7 MMOL/L (ref 3.5–5.2)
PROT SERPL-MCNC: 7.8 G/DL (ref 6–8.5)
SODIUM BLD-SCNC: 139 MMOL/L (ref 136–145)

## 2018-11-13 PROCEDURE — 80053 COMPREHEN METABOLIC PANEL: CPT

## 2018-11-13 PROCEDURE — 36415 COLL VENOUS BLD VENIPUNCTURE: CPT

## 2018-11-15 ENCOUNTER — TELEPHONE (OUTPATIENT)
Dept: ORTHOPEDIC SURGERY | Facility: CLINIC | Age: 68
End: 2018-11-15

## 2018-11-15 NOTE — TELEPHONE ENCOUNTER
Patient calling for lab results for liver check. Also, needs help with adjusting and weaning pain meds.

## 2018-11-15 NOTE — TELEPHONE ENCOUNTER
Please let patient know that her lab results was normal.  Would recommend that she discuss weaning off pain medications with PCP

## 2018-11-16 NOTE — TELEPHONE ENCOUNTER
As per JUAN CARLOS, I called patient to let her know that her MARIA E was reviewed.  At this point it does not look like she has gotten large quantities of narcotics.  However if she feels like she is going to have withdrawal symptoms, she should discuss it with her PCP.    Patient asked if she could get more pain medication to help wean off, if necessary.  JUAN CARLOS said that she is too far out from surgery and we have prescribed last pain medication

## 2018-11-16 NOTE — TELEPHONE ENCOUNTER
Patient says that her PCP, Dr. Batista, has refused to help her with this.  She is frustrated (with her PCP) and will try to get another PCP and is going to try to wean off of the meds on her own.

## 2018-11-29 ENCOUNTER — OFFICE VISIT (OUTPATIENT)
Dept: ORTHOPEDIC SURGERY | Facility: CLINIC | Age: 68
End: 2018-11-29

## 2018-11-29 ENCOUNTER — TELEPHONE (OUTPATIENT)
Dept: ORTHOPEDIC SURGERY | Facility: CLINIC | Age: 68
End: 2018-11-29

## 2018-11-29 VITALS — HEIGHT: 63 IN | BODY MASS INDEX: 35.61 KG/M2 | WEIGHT: 201 LBS | TEMPERATURE: 98.1 F

## 2018-11-29 DIAGNOSIS — Z96.651 STATUS POST RIGHT KNEE REPLACEMENT: ICD-10-CM

## 2018-11-29 DIAGNOSIS — Z96.651 STATUS POST TOTAL RIGHT KNEE REPLACEMENT: Primary | ICD-10-CM

## 2018-11-29 PROCEDURE — 99024 POSTOP FOLLOW-UP VISIT: CPT | Performed by: NURSE PRACTITIONER

## 2018-11-29 PROCEDURE — 73562 X-RAY EXAM OF KNEE 3: CPT | Performed by: NURSE PRACTITIONER

## 2018-11-29 NOTE — TELEPHONE ENCOUNTER
Patient was seen in office today 11/29 for 8 week FU R TKA 10/01/18 - patient forgot to ask when she can start driving again? Thanks /srh

## 2018-11-29 NOTE — TELEPHONE ENCOUNTER
Okay to start driving if she is off all pain medication and feel safe driving in a parking lot first

## 2018-11-29 NOTE — PROGRESS NOTES
Chanell Tafoya : 1950 MRN: [id number] DATE: 2018    DIAGNOSIS: 8 week follow up right total knee      SUBJECTIVE:Patient returns today for 8 week follow up of right total knee replacement. Patient reports doing well with no unusual complaints. Appears to be progressing appropriately.    OBJECTIVE:   Exam:. The incision is well healed. No sign of infection. Range of motion is measured at 0 to 120. The calf is soft and nontender with a negative Homans sign. Strength is progressing and the patient is ambulating appropriately.    DIAGNOSTIC STUDIES  Xrays: 3 views of the right knee (AP, lateral, and sunrise) were ordered and reviewed for evaluation of recent knee replacement. They demonstrate a well positioned, well aligned knee replacement without complicating factors noted. In comparison with previous films there has been no change.    ASSESSMENT: 8 week status post right knee replacement.    PLAN: 1) Continue with PT exercises as prescribed   2) Follow up in 10 months    Verona Mckeon, APRN  2018

## 2018-11-30 ENCOUNTER — TELEPHONE (OUTPATIENT)
Dept: ORTHOPEDIC SURGERY | Facility: CLINIC | Age: 68
End: 2018-11-30

## 2018-12-03 ENCOUNTER — TELEPHONE (OUTPATIENT)
Dept: ORTHOPEDIC SURGERY | Facility: CLINIC | Age: 68
End: 2018-12-03

## 2018-12-04 NOTE — TELEPHONE ENCOUNTER
There is another message in patient's chart addressing this.  I did not call patient's physical therapist.  However it looks like on a medical assistance did talk with her and no further action as needed.

## 2018-12-05 ENCOUNTER — READMISSION MANAGEMENT (OUTPATIENT)
Dept: CALL CENTER | Facility: HOSPITAL | Age: 68
End: 2018-12-05

## 2018-12-05 NOTE — OUTREACH NOTE
Total Joint Month 2 Survey      Responses   Facility patient discharged from?  [facility]   Does the patient have one of the following disease processes/diagnoses(primary or secondary)?  Total Joint Replacement   Joint surgery performed?  Knee   Month 2 attempt successful?  Yes   Call start time  0942   Call end time  0957   Has the patient been back in either the hospital or Emergency Department since discharge?  No   Discharge diagnosis  Primary osteoarthritis of right knee TOTAL KNEE ARTHROPLASTY   Is patient permission given to speak with other caregiver?  No   Is the patient taking all medications as directed (includes completed medication regime)?  Yes   Medication comments  MD added Doxycycline for respiratory inf.   Has the patient kept scheduled appointments due by today?  Yes   Is the patient still receiving Home Health Services?  N/A   Is the patient still attending therapy sessions(either in the home or as an outpatient)?  No [Has not been doing outpt PT or HH PT r/t her being sick for last 2wks with illness. Will begin outpt PT next week. ]   Has the patient fallen since discharge?  No   Comments  Incision healing well, no s/sx of inf. Joint swells but she is propping foot up as much as possible. Walking independently.    What is the patient's perception of their functional status since discharge?  Improving   Is the patient able to teach back signs and symptoms of infection?  Temp >100.4 for 24h or longer, Incisional drainage, Increased swelling or redness around incision (not associated with surgical edema), Shortness of breath or chest pain   Is the patient/caregiver able to teach back the hierarchy of who to call/visit for symptoms/problems? PCP, Specialist, Home health nurse, Urgent Care, ED, 911  Yes   Additional teach back comments  No longer on Warfarin. Incision healed well. Having some edema when up on leg for long periods, is elevating and using ice.    Month 2 Call Completed?  Yes           Chelsi Wisdom RN

## 2019-01-04 ENCOUNTER — READMISSION MANAGEMENT (OUTPATIENT)
Dept: CALL CENTER | Facility: HOSPITAL | Age: 69
End: 2019-01-04

## 2019-01-04 NOTE — OUTREACH NOTE
Total Joint Month 3 Survey      Responses   Facility patient discharged from?  [facility]   Does the patient have one of the following disease processes/diagnoses(primary or secondary)?  Total Joint Replacement   Joint surgery performed?  Knee   Month 3 attempt successful?  Yes   Call start time  1615   Call end time  1618   Has the patient been back in either the hospital or Emergency Department since discharge?  No   Is patient permission given to speak with other caregiver?  No   Is the patient taking all medications as directed (includes completed medication regime)?  Yes   Is the patient able to teach back alternate methods of pain control?  Ice, Knee-elevation/no pillow under knee, Reposition, Correct alignment, Short, frequent activity   Has the patient kept scheduled appointments due by today?  Yes   Is the patient still receiving Home Health Services?  N/A   Is the patient still attending therapy sessions(either in the home or as an outpatient)?  Yes   Has the patient fallen since discharge?  No   What is the patient's perception of their functional status since discharge?  Improving   Is the patient able to teach back signs and symptoms of infection?  Temp >100.4 for 24h or longer, Incisional drainage, Blisters around incision, Increased swelling or redness around incision (not associated with surgical edema), Severe discomfort or pain, Changes in mobility, Shortness of breath or chest pain   Is the patient/caregiver able to teach back the hierarchy of who to call/visit for symptoms/problems? PCP, Specialist, Home health nurse, Urgent Care, ED, 911  Yes   Graduated  Yes   Did the patient feel the follow up calls were helpful during their recovery period?  Yes   Was the number of calls appropriate?  Yes          Carrie Brown RN

## 2019-01-18 ENCOUNTER — TRANSCRIBE ORDERS (OUTPATIENT)
Dept: ADMINISTRATIVE | Facility: HOSPITAL | Age: 69
End: 2019-01-18

## 2019-01-18 ENCOUNTER — LAB (OUTPATIENT)
Dept: LAB | Facility: HOSPITAL | Age: 69
End: 2019-01-18
Attending: INTERNAL MEDICINE

## 2019-01-18 DIAGNOSIS — E78.5 HYPERLIPIDEMIA, UNSPECIFIED HYPERLIPIDEMIA TYPE: ICD-10-CM

## 2019-01-18 DIAGNOSIS — E78.5 HYPERLIPIDEMIA, UNSPECIFIED HYPERLIPIDEMIA TYPE: Primary | ICD-10-CM

## 2019-01-18 LAB
ALBUMIN SERPL-MCNC: 4.2 G/DL (ref 3.5–5.2)
ALBUMIN/GLOB SERPL: 1.2 G/DL
ALP SERPL-CCNC: 87 U/L (ref 39–117)
ALT SERPL W P-5'-P-CCNC: 16 U/L (ref 1–33)
ANION GAP SERPL CALCULATED.3IONS-SCNC: 11.6 MMOL/L
AST SERPL-CCNC: 16 U/L (ref 1–32)
BILIRUB SERPL-MCNC: 0.9 MG/DL (ref 0.1–1.2)
BUN BLD-MCNC: 16 MG/DL (ref 8–23)
BUN/CREAT SERPL: 20.5 (ref 7–25)
CALCIUM SPEC-SCNC: 9.6 MG/DL (ref 8.6–10.5)
CHLORIDE SERPL-SCNC: 98 MMOL/L (ref 98–107)
CO2 SERPL-SCNC: 28.4 MMOL/L (ref 22–29)
CREAT BLD-MCNC: 0.78 MG/DL (ref 0.57–1)
GFR SERPL CREATININE-BSD FRML MDRD: 73 ML/MIN/1.73
GLOBULIN UR ELPH-MCNC: 3.4 GM/DL
GLUCOSE BLD-MCNC: 106 MG/DL (ref 65–99)
POTASSIUM BLD-SCNC: 3.9 MMOL/L (ref 3.5–5.2)
PROT SERPL-MCNC: 7.6 G/DL (ref 6–8.5)
SODIUM BLD-SCNC: 138 MMOL/L (ref 136–145)

## 2019-01-18 PROCEDURE — 80053 COMPREHEN METABOLIC PANEL: CPT

## 2019-01-18 PROCEDURE — 36415 COLL VENOUS BLD VENIPUNCTURE: CPT

## 2019-01-29 ENCOUNTER — LAB (OUTPATIENT)
Dept: LAB | Facility: HOSPITAL | Age: 69
End: 2019-01-29
Attending: INTERNAL MEDICINE

## 2019-01-29 ENCOUNTER — TRANSCRIBE ORDERS (OUTPATIENT)
Dept: ADMINISTRATIVE | Facility: HOSPITAL | Age: 69
End: 2019-01-29

## 2019-01-29 DIAGNOSIS — E78.5 HYPERLIPIDEMIA, UNSPECIFIED HYPERLIPIDEMIA TYPE: Primary | ICD-10-CM

## 2019-01-29 DIAGNOSIS — E78.5 HYPERLIPIDEMIA, UNSPECIFIED HYPERLIPIDEMIA TYPE: ICD-10-CM

## 2019-01-29 LAB
CHOLEST SERPL-MCNC: 212 MG/DL (ref 0–200)
HDLC SERPL-MCNC: 59 MG/DL (ref 40–60)
LDLC SERPL CALC-MCNC: 120 MG/DL (ref 0–100)
LDLC/HDLC SERPL: 2.04 {RATIO}
TRIGL SERPL-MCNC: 163 MG/DL (ref 0–150)
VLDLC SERPL-MCNC: 32.6 MG/DL (ref 5–40)

## 2019-01-29 PROCEDURE — 80061 LIPID PANEL: CPT

## 2019-01-29 PROCEDURE — 36415 COLL VENOUS BLD VENIPUNCTURE: CPT

## 2019-05-07 RX ORDER — LOSARTAN POTASSIUM 50 MG/1
50 TABLET ORAL DAILY
Qty: 90 TABLET | Refills: 1 | Status: SHIPPED | OUTPATIENT
Start: 2019-05-07 | End: 2019-05-21 | Stop reason: SDUPTHER

## 2019-05-21 RX ORDER — HYDROCHLOROTHIAZIDE 25 MG/1
TABLET ORAL
Qty: 90 TABLET | Refills: 1 | Status: SHIPPED | OUTPATIENT
Start: 2019-05-21 | End: 2019-11-01 | Stop reason: SDUPTHER

## 2019-05-21 RX ORDER — LOSARTAN POTASSIUM 50 MG/1
TABLET ORAL
Qty: 90 TABLET | Refills: 1 | Status: SHIPPED | OUTPATIENT
Start: 2019-05-21 | End: 2019-11-01 | Stop reason: SDUPTHER

## 2019-05-28 ENCOUNTER — TELEPHONE (OUTPATIENT)
Dept: CARDIOLOGY | Facility: CLINIC | Age: 69
End: 2019-05-28

## 2019-05-28 RX ORDER — METOPROLOL SUCCINATE 25 MG/1
12.5 TABLET, EXTENDED RELEASE ORAL DAILY
Qty: 45 TABLET | Refills: 1 | Status: SHIPPED | OUTPATIENT
Start: 2019-05-28 | End: 2019-11-22 | Stop reason: SDUPTHER

## 2019-05-28 NOTE — TELEPHONE ENCOUNTER
Pt called requesting refill auth be sent to [facility] pharmacy for Metoprolol Succinate 25mg 1/2 tab daily.

## 2019-06-20 ENCOUNTER — OFFICE VISIT (OUTPATIENT)
Dept: CARDIOLOGY | Facility: CLINIC | Age: 69
End: 2019-06-20

## 2019-06-20 VITALS
WEIGHT: 203 LBS | BODY MASS INDEX: 35.97 KG/M2 | HEART RATE: 87 BPM | DIASTOLIC BLOOD PRESSURE: 72 MMHG | HEIGHT: 63 IN | SYSTOLIC BLOOD PRESSURE: 112 MMHG

## 2019-06-20 DIAGNOSIS — E78.5 DYSLIPIDEMIA: ICD-10-CM

## 2019-06-20 DIAGNOSIS — I45.10 RBBB: ICD-10-CM

## 2019-06-20 DIAGNOSIS — Z95.0 PRESENCE OF CARDIAC PACEMAKER: Primary | ICD-10-CM

## 2019-06-20 DIAGNOSIS — R00.1 BRADYCARDIA, SINUS: ICD-10-CM

## 2019-06-20 PROBLEM — Z99.89 OBSTRUCTIVE SLEEP APNEA ON CPAP: Status: ACTIVE | Noted: 2019-06-20

## 2019-06-20 PROBLEM — K21.9 GERD (GASTROESOPHAGEAL REFLUX DISEASE): Status: ACTIVE | Noted: 2019-06-20

## 2019-06-20 PROBLEM — G47.33 OBSTRUCTIVE SLEEP APNEA ON CPAP: Status: ACTIVE | Noted: 2019-06-20

## 2019-06-20 PROCEDURE — 99213 OFFICE O/P EST LOW 20 MIN: CPT | Performed by: INTERNAL MEDICINE

## 2019-06-20 PROCEDURE — 93288 INTERROG EVL PM/LDLS PM IP: CPT | Performed by: INTERNAL MEDICINE

## 2019-06-20 RX ORDER — LOSARTAN POTASSIUM 50 MG/1
50 TABLET ORAL DAILY
Qty: 90 TABLET | Refills: 1 | Status: CANCELLED | OUTPATIENT
Start: 2019-06-20

## 2019-06-20 NOTE — PROGRESS NOTES
Subjective:     Encounter Date:06/20/2019      Patient ID: Chanell Tafoya is a 68 y.o. female.    Chief Complaint:  Chief Complaint   Patient presents with   • Coronary Artery Disease       HPI:  I had the pleasure of seeing Ms. Tafoya in the office today.  She is very pleasant 68-year-old female with a history of sinus bradycardia, requiring pacemaker implantation.  She also has a history of hypertension and dyslipidemia.  She does have a new complaint of her blood pressure dropping in the afternoon.  She does become somewhat symptomatic.   She does state that her activity level has increased since implantation of the pacemaker.  She states that she feels much better.  She is not complain of chest discomfort or shortness of air.  She is not complain of palpitations, dizziness or near syncope.  She does have obstructive sleep apnea and uses CPAP    The following portions of the patient's history were reviewed and updated as appropriate: allergies, current medications, past family history, past medical history, past social history, past surgical history and problem list.    Problem List:  Patient Active Problem List   Diagnosis   • Primary osteoarthritis of right knee   • Chest pain   • Constipation   • Difficulty walking   • Presence of cardiac pacemaker   • RBBB   • Dyslipidemia   • GERD (gastroesophageal reflux disease)   • Bradycardia, sinus   • Obstructive sleep apnea on CPAP       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   • GERD (gastroesophageal reflux disease)    • Heart murmur    • History of cellulitis    • History of kidney stones    • History of staph infection     IN FOOT AFTER SURGERY   • Hyperlipidemia    • Hypertension    • Nonocclusive coronary atherosclerosis of native coronary artery    • Osteoarthritis    • Sarcoidosis    • Sleep apnea     cpap       Past Surgical History:  Past Surgical History:   Procedure Laterality  Date   • APPENDECTOMY  1981   • CARDIAC CATHETERIZATION     • CHOLECYSTECTOMY  1998   • COLONOSCOPY     • ENDOSCOPY     • ESOPHAGEAL DILATATION  2015   • FOOT SURGERY Right 2012    3 screws   • HAND SURGERY Left 1993    thumb reconstruction    • HAND SURGERY Right 2007    thumb reconstruction   • HYSTERECTOMY  1981   • INSERT / REPLACE / REMOVE PACEMAKER     • KIDNEY STONE SURGERY  2006   • KNEE SURGERY Right 2006    torn meniscus    • LUNG SURGERY Right 2001    lymph node biopsied (sarcoidosis)   • TONSILLECTOMY AND ADENOIDECTOMY  1955   • TOTAL KNEE ARTHROPLASTY Right 10/1/2018    Procedure: TOTAL KNEE ARTHROPLASTY;  Surgeon: Mathew Sloan MD;  Location: [facility] MAIN OR;  Service: Orthopedics       Social History:  Social History     Socioeconomic History   • Marital status:      Spouse name: Not on file   • Number of children: Not on file   • Years of education: Not on file   • Highest education level: Not on file   Tobacco Use   • Smoking status: Never Smoker   • Smokeless tobacco: Never Used   Substance and Sexual Activity   • Alcohol use: No   • Drug use: No   • Sexual activity: Defer       Allergies:  Allergies   Allergen Reactions   • Amitriptyline Shortness Of Breath     Anaphylactic episode   • Asa [Aspirin] Other (See Comments)     Severe abdominal pain   • Azithromycin Other (See Comments)   • Clindamycin/Lincomycin Other (See Comments)     Blisters and erosions in esophagus & stomach   • Codeine Other (See Comments)     In large amounts causes headaches   • Dilaudid [Hydromorphone]      Causes chest pains/breathing problems when given by IV   • Fluconazole Other (See Comments)   • Gabapentin Diarrhea   • Ibuprofen Swelling     Swelling of hands and legs   • Lubiprostone Other (See Comments)   • Naproxen      Blisters in mouth/stomach   • Other      Surgical tape causes blisters     • Talwin [Pentazocine] Itching           ROS:  Review of Systems   Constitution: Negative for chills, decreased  "appetite, fever, malaise/fatigue, weight gain and weight loss.   HENT: Negative for congestion, hoarse voice, nosebleeds and sore throat.    Eyes: Negative for blurred vision, double vision and visual disturbance.   Cardiovascular: Positive for leg swelling. Negative for chest pain, claudication, dyspnea on exertion, irregular heartbeat, near-syncope, orthopnea, palpitations, paroxysmal nocturnal dyspnea and syncope.   Respiratory: Negative for cough, hemoptysis, shortness of breath, sleep disturbances due to breathing, snoring, sputum production and wheezing.    Endocrine: Negative for cold intolerance, heat intolerance, polydipsia and polyuria.   Hematologic/Lymphatic: Negative for adenopathy and bleeding problem. Does not bruise/bleed easily.   Skin: Negative for flushing, itching, nail changes and rash.   Musculoskeletal: Positive for arthritis. Negative for back pain, joint pain, muscle cramps, muscle weakness, myalgias and neck pain.   Gastrointestinal: Positive for bloating and heartburn. Negative for abdominal pain, anorexia, change in bowel habit, constipation, diarrhea, hematemesis, hematochezia, jaundice, melena, nausea and vomiting.   Genitourinary: Negative for dysuria, hematuria and nocturia.   Neurological: Positive for dizziness. Negative for brief paralysis, disturbances in coordination, excessive daytime sleepiness, headaches, light-headedness, loss of balance, numbness, paresthesias, seizures and vertigo.   Psychiatric/Behavioral: Negative for altered mental status and depression. The patient is not nervous/anxious.    Allergic/Immunologic: Negative for environmental allergies and hives.          Objective:         /72   Pulse 87   Ht 160 cm (63\")   Wt 92.1 kg (203 lb)   BMI 35.96 kg/m²     Physical Exam   Constitutional: She is oriented to person, place, and time. She appears well-developed and well-nourished. No distress.   HENT:   Head: Normocephalic and atraumatic.   Mouth/Throat: " Oropharynx is clear and moist.   Eyes: Conjunctivae and EOM are normal. Pupils are equal, round, and reactive to light. No scleral icterus.   Neck: Normal range of motion. Neck supple. No thyromegaly present.   Cardiovascular: Normal rate, regular rhythm, S1 normal, S2 normal and intact distal pulses.  No extrasystoles are present. PMI is not displaced. Exam reveals no gallop, no S3, no S4, no friction rub and no decreased pulses.   Murmur ( There is a 1/6 systolic murmur heard at the left lower sternal border) heard.  Pulses:       Carotid pulses are 2+ on the right side, and 2+ on the left side.       Dorsalis pedis pulses are 2+ on the right side, and 2+ on the left side.        Posterior tibial pulses are 2+ on the right side, and 2+ on the left side.   Pulmonary/Chest: Effort normal and breath sounds normal. No respiratory distress. She has no wheezes. She has no rales.   Abdominal: Soft. Bowel sounds are normal. She exhibits no distension and no mass. There is no tenderness. There is no rebound and no guarding.   Musculoskeletal: Normal range of motion. She exhibits edema ( Mild ankle edema).   Lymphadenopathy:     She has no cervical adenopathy.   Neurological: She is alert and oriented to person, place, and time. Coordination normal.   Skin: Skin is warm and dry. No rash noted. She is not diaphoretic. No pallor.   Psychiatric: She has a normal mood and affect. Her behavior is normal.   Nursing note and vitals reviewed.      In-Office Procedure(s):  Procedures    ASCVD RIsk Score::  The 10-year ASCVD risk score ([place] ROSALIA Jr., et al., 2013) is: 6.7%    Values used to calculate the score:      Age: 68 years      Sex: Female      Is Non- : No      Diabetic: No      Tobacco smoker: No      Systolic Blood Pressure: 112 mmHg      Is BP treated: No      HDL Cholesterol: 59 mg/dL      Total Cholesterol: 212 mg/dL    Recent Radiology:  Imaging Results (most recent)     None          Lab Review:    No visits with results within 2 Month(s) from this visit.   Latest known visit with results is:   Lab on 01/29/2019   Component Date Value   • Total Cholesterol 01/29/2019 212*   • Triglycerides 01/29/2019 163*   • HDL Cholesterol 01/29/2019 59    • LDL Cholesterol  01/29/2019 120*   • VLDL Cholesterol 01/29/2019 32.6    • LDL/HDL Ratio 01/29/2019 2.04               Invalid input(s): ALKPO4                        Invalid input(s): LDLCALC                  Problems Addressed this Visit        Cardiovascular and Mediastinum    Presence of cardiac pacemaker - Primary    RBBB    Bradycardia, sinus     Underlying rhythm with her pacemaker is significant sinus bradycardia            Other    Dyslipidemia     Intolerant to statins               Malu Patten MD  06/20/19  .

## 2019-06-21 DIAGNOSIS — I34.0 MITRAL VALVE INSUFFICIENCY, UNSPECIFIED ETIOLOGY: Primary | ICD-10-CM

## 2019-06-21 DIAGNOSIS — I07.1 TRICUSPID VALVE INSUFFICIENCY, UNSPECIFIED ETIOLOGY: ICD-10-CM

## 2019-06-21 DIAGNOSIS — I35.0 AORTIC STENOSIS, MILD: ICD-10-CM

## 2019-06-28 ENCOUNTER — HOSPITAL ENCOUNTER (OUTPATIENT)
Dept: CARDIOLOGY | Facility: HOSPITAL | Age: 69
Discharge: HOME | End: 2019-06-28
Admitting: INTERNAL MEDICINE

## 2019-06-28 VITALS
BODY MASS INDEX: 35.94 KG/M2 | SYSTOLIC BLOOD PRESSURE: 131 MMHG | DIASTOLIC BLOOD PRESSURE: 75 MMHG | HEIGHT: 63 IN | WEIGHT: 202.82 LBS

## 2019-06-28 DIAGNOSIS — I35.0 AORTIC STENOSIS, MILD: ICD-10-CM

## 2019-06-28 DIAGNOSIS — I34.0 MITRAL VALVE INSUFFICIENCY, UNSPECIFIED ETIOLOGY: ICD-10-CM

## 2019-06-28 DIAGNOSIS — I07.1 TRICUSPID VALVE INSUFFICIENCY, UNSPECIFIED ETIOLOGY: ICD-10-CM

## 2019-06-28 PROCEDURE — 93306 TTE W/DOPPLER COMPLETE: CPT

## 2019-06-28 PROCEDURE — 25010000002 PERFLUTREN (DEFINITY) 8.476 MG IN SODIUM CHLORIDE 0.9 % 10 ML INJECTION: Performed by: INTERNAL MEDICINE

## 2019-06-28 PROCEDURE — 93306 TTE W/DOPPLER COMPLETE: CPT | Performed by: INTERNAL MEDICINE

## 2019-06-28 RX ADMIN — SODIUM CHLORIDE 2 ML: 9 INJECTION INTRAMUSCULAR; INTRAVENOUS; SUBCUTANEOUS at 14:50

## 2019-06-29 LAB
AORTIC DIMENSIONLESS INDEX: 0.6 (DI)
BH CV ECHO MEAS - AO MAX PG: 7.2 MMHG
BH CV ECHO MEAS - AO MEAN PG (FULL): 2 MMHG
BH CV ECHO MEAS - AO MEAN PG: 4 MMHG
BH CV ECHO MEAS - AO V2 MAX: 133 CM/SEC
BH CV ECHO MEAS - AO V2 MEAN: 98 CM/SEC
BH CV ECHO MEAS - AO V2 VTI: 35.6 CM
BH CV ECHO MEAS - ASC AORTA: 3.1 CM
BH CV ECHO MEAS - AVA(I,A): 2.2 CM^2
BH CV ECHO MEAS - AVA(I,D): 2.2 CM^2
BH CV ECHO MEAS - BSA(HAYCOCK): 84.8 M^2
BH CV ECHO MEAS - BSA: 36.7 M^2
BH CV ECHO MEAS - BZI_BMI: NORMAL KILOGRAMS/M^2
BH CV ECHO MEAS - BZI_METRIC_HEIGHT: 160 CM
BH CV ECHO MEAS - BZI_METRIC_WEIGHT: NORMAL KG
BH CV ECHO MEAS - EDV(CUBED): 140.6 ML
BH CV ECHO MEAS - EDV(MOD-SP2): 63 ML
BH CV ECHO MEAS - EDV(MOD-SP4): 56 ML
BH CV ECHO MEAS - EDV(TEICH): 129.5 ML
BH CV ECHO MEAS - EF(CUBED): 51 %
BH CV ECHO MEAS - EF(MOD-BP): 41 %
BH CV ECHO MEAS - EF(MOD-SP2): 44.4 %
BH CV ECHO MEAS - EF(MOD-SP4): 41.1 %
BH CV ECHO MEAS - EF(TEICH): 42.7 %
BH CV ECHO MEAS - ESV(CUBED): 68.9 ML
BH CV ECHO MEAS - ESV(MOD-SP2): 35 ML
BH CV ECHO MEAS - ESV(MOD-SP4): 33 ML
BH CV ECHO MEAS - ESV(TEICH): 74.2 ML
BH CV ECHO MEAS - FS: 21.2 %
BH CV ECHO MEAS - IVS/LVPW: 1.3
BH CV ECHO MEAS - IVSD: 1 CM
BH CV ECHO MEAS - LAT PEAK E' VEL: 7.2 CM/SEC
BH CV ECHO MEAS - LV DIASTOLIC VOL/BSA (35-75): 1.5 ML/M^2
BH CV ECHO MEAS - LV MASS(C)D: 169 GRAMS
BH CV ECHO MEAS - LV MASS(C)DI: 4.6 GRAMS/M^2
BH CV ECHO MEAS - LV MAX PG: 2.5 MMHG
BH CV ECHO MEAS - LV MEAN PG: 2 MMHG
BH CV ECHO MEAS - LV SYSTOLIC VOL/BSA (12-30): 0.9 ML/M^2
BH CV ECHO MEAS - LV V1 MAX: 79 CM/SEC
BH CV ECHO MEAS - LV V1 MEAN: 61.3 CM/SEC
BH CV ECHO MEAS - LV V1 VTI: 20.9 CM
BH CV ECHO MEAS - LVIDD: 5.2 CM
BH CV ECHO MEAS - LVIDS: 4.1 CM
BH CV ECHO MEAS - LVLD AP2: 8.3 CM
BH CV ECHO MEAS - LVLD AP4: 8.3 CM
BH CV ECHO MEAS - LVLS AP2: 8.1 CM
BH CV ECHO MEAS - LVLS AP4: 7.8 CM
BH CV ECHO MEAS - LVOT AREA (M): 3.8 CM^2
BH CV ECHO MEAS - LVOT AREA: 3.8 CM^2
BH CV ECHO MEAS - LVOT DIAM: 2.2 CM
BH CV ECHO MEAS - LVPWD: 0.8 CM
BH CV ECHO MEAS - MED PEAK E' VEL: 5.3 CM/SEC
BH CV ECHO MEAS - MV A DUR: 127 SEC
BH CV ECHO MEAS - MV A MAX VEL: 86.2 CM/SEC
BH CV ECHO MEAS - MV DEC SLOPE: 505 CM/SEC^2
BH CV ECHO MEAS - MV DEC TIME: 130 SEC
BH CV ECHO MEAS - MV E MAX VEL: 72.3 CM/SEC
BH CV ECHO MEAS - MV E/A: 0.84
BH CV ECHO MEAS - MV MEAN PG: 1 MMHG
BH CV ECHO MEAS - MV P1/2T MAX VEL: 97 CM/SEC
BH CV ECHO MEAS - MV P1/2T: 56.3 MSEC
BH CV ECHO MEAS - MV V2 MEAN: 53.9 CM/SEC
BH CV ECHO MEAS - MV V2 VTI: 28.2 CM
BH CV ECHO MEAS - MVA P1/2T LCG: 2.3 CM^2
BH CV ECHO MEAS - MVA(P1/2T): 3.9 CM^2
BH CV ECHO MEAS - MVA(VTI): 2.8 CM^2
BH CV ECHO MEAS - PA ACC SLOPE: 509 CM/SEC^2
BH CV ECHO MEAS - PA ACC TIME: 0.11 SEC
BH CV ECHO MEAS - PA MAX PG (FULL): 3.3 MMHG
BH CV ECHO MEAS - PA MAX PG: 4.5 MMHG
BH CV ECHO MEAS - PA PR(ACCEL): 30 MMHG
BH CV ECHO MEAS - PA V2 MAX: 106 CM/SEC
BH CV ECHO MEAS - PVA(V,A): 2.7 CM^2
BH CV ECHO MEAS - PVA(V,D): 2.7 CM^2
BH CV ECHO MEAS - QP/QS: 0.87
BH CV ECHO MEAS - RV MAX PG: 1.1 MMHG
BH CV ECHO MEAS - RV MEAN PG: 1 MMHG
BH CV ECHO MEAS - RV V1 MAX: 53.6 CM/SEC
BH CV ECHO MEAS - RV V1 MEAN: 33.4 CM/SEC
BH CV ECHO MEAS - RV V1 VTI: 13 CM
BH CV ECHO MEAS - RVOT AREA: 5.3 CM^2
BH CV ECHO MEAS - RVOT DIAM: 2.6 CM
BH CV ECHO MEAS - SI(CUBED): 2 ML/M^2
BH CV ECHO MEAS - SI(LVOT): 2.2 ML/M^2
BH CV ECHO MEAS - SI(MOD-SP2): 0.76 ML/M^2
BH CV ECHO MEAS - SI(MOD-SP4): 0.63 ML/M^2
BH CV ECHO MEAS - SI(TEICH): 1.5 ML/M^2
BH CV ECHO MEAS - SV(CUBED): 71.7 ML
BH CV ECHO MEAS - SV(LVOT): 79.4 ML
BH CV ECHO MEAS - SV(MOD-SP2): 28 ML
BH CV ECHO MEAS - SV(MOD-SP4): 23 ML
BH CV ECHO MEAS - SV(RVOT): 69 ML
BH CV ECHO MEAS - SV(TEICH): 55.3 ML
BH CV ECHO MEAS - TAPSE (>1.6): 9 CM2
BH CV ECHO MEAS - TR MAX VEL: 242 CM/SEC
BH CV ECHO MEASUREMENTS AVERAGE E/E' RATIO: 11.57
BH CV XLRA - RV BASE: 3 CM
MAXIMAL PREDICTED HEART RATE: 151 BPM
STRESS TARGET HR: 128 BPM

## 2019-09-16 ENCOUNTER — TELEPHONE (OUTPATIENT)
Dept: CARDIOLOGY | Facility: CLINIC | Age: 69
End: 2019-09-16

## 2019-09-16 NOTE — TELEPHONE ENCOUNTER
----- Message from Tico Lentz sent at 9/16/2019  2:07 PM EDT -----  Contact: PATIENT  Patient is scheduled for endoscopy on 09/18/2019 and would like to know if she can hold her Losartan the morning of procedure? She is worried BP will drop to low while under conscious sedation.     Pt. Tel.#[phone].

## 2019-10-01 ENCOUNTER — TELEPHONE (OUTPATIENT)
Dept: CARDIOLOGY | Facility: CLINIC | Age: 69
End: 2019-10-01

## 2019-10-01 NOTE — TELEPHONE ENCOUNTER
Patient fell in the shower this morning & is worried about her pacemaker & wants to know if everything is ok, she downloaded a transmission this morning. I advised her that it usually takes 24 hours before you receive the transmission. She is asking if you could call her when you get it to let her know if it is ok. Pt # [phone]. dmk

## 2019-10-31 ENCOUNTER — OFFICE VISIT (OUTPATIENT)
Dept: ORTHOPEDIC SURGERY | Facility: CLINIC | Age: 69
End: 2019-10-31

## 2019-10-31 VITALS — TEMPERATURE: 98.2 F | WEIGHT: 202 LBS | HEIGHT: 63 IN | BODY MASS INDEX: 35.79 KG/M2

## 2019-10-31 DIAGNOSIS — Z96.651 STATUS POST TOTAL RIGHT KNEE REPLACEMENT: Primary | ICD-10-CM

## 2019-10-31 DIAGNOSIS — M17.12 PRIMARY OSTEOARTHRITIS OF LEFT KNEE: ICD-10-CM

## 2019-10-31 PROCEDURE — 73562 X-RAY EXAM OF KNEE 3: CPT | Performed by: ORTHOPAEDIC SURGERY

## 2019-10-31 PROCEDURE — 99212 OFFICE O/P EST SF 10 MIN: CPT | Performed by: ORTHOPAEDIC SURGERY

## 2019-10-31 NOTE — PROGRESS NOTES
"Chanell Tafoya : 1950 MRN: [id number] DATE: 10/31/2019    Chief Complaint:  Follow up right total knee      SUBJECTIVE:Patient returns today for  1 follow up of right total knee replacement. Patient reports doing well with no unusual complaints. Denies any limitations due to the knee. Has left knee OA- stable.    OBJECTIVE:    Temp 98.2 °F (36.8 °C)   Ht 160 cm (62.99\")   Wt 91.6 kg (202 lb)   BMI 35.79 kg/m²   Family History   Problem Relation Age of Onset   • Heart disease Mother    • Hypertension Mother    • Heart failure Mother    • Diabetes Mother    • Aortic aneurysm Father    • Cancer Father         skin, prostate, bladder   • Diabetes Sister    • Malig Hyperthermia Neg Hx      Past Medical History:   Diagnosis Date   • Bradycardia     s/p MDT dual chamber PPM 2018   • GERD (gastroesophageal reflux disease)    • Heart murmur    • History of cellulitis    • History of kidney stones    • History of staph infection     IN FOOT AFTER SURGERY   • Hyperlipidemia    • Hypertension    • Nonocclusive coronary atherosclerosis of native coronary artery    • Osteoarthritis    • Sarcoidosis    • Sleep apnea     cpap     Past Surgical History:   Procedure Laterality Date   • APPENDECTOMY     • CARDIAC CATHETERIZATION     • CHOLECYSTECTOMY     • COLONOSCOPY     • ENDOSCOPY     • ESOPHAGEAL DILATATION     • FOOT SURGERY Right     3 screws   • HAND SURGERY Left     thumb reconstruction    • HAND SURGERY Right     thumb reconstruction   • HYSTERECTOMY     • INSERT / REPLACE / REMOVE PACEMAKER     • KIDNEY STONE SURGERY     • KNEE SURGERY Right     torn meniscus    • LUNG SURGERY Right     lymph node biopsied (sarcoidosis)   • TONSILLECTOMY AND ADENOIDECTOMY     • TOTAL KNEE ARTHROPLASTY Right 10/1/2018    Procedure: TOTAL KNEE ARTHROPLASTY;  Surgeon: Mathew Sloan MD;  Location: [facility] OR;  Service: Orthopedics     Social History     Socioeconomic History   • " Marital status:      Spouse name: Not on file   • Number of children: Not on file   • Years of education: Not on file   • Highest education level: Not on file   Tobacco Use   • Smoking status: Never Smoker   • Smokeless tobacco: Never Used   Substance and Sexual Activity   • Alcohol use: No   • Drug use: No   • Sexual activity: Defer       Review of Systems: 14 point review of systems performed pertinent positives and negatives discussed above, all other systems are negative    Exam:. The incision is well healed. Range of motion is measured at 0 to 120. The calf is soft and nontender with a negative Homans sign. Alignment is neutral. Good quad strength. There is no evidence of varus/valgus or flexion instability. No effusion. Intact to light touch with palpable distal pulses.     DIAGNOSTIC STUDIES  Xrays: 3 views right knee (AP bilateral knees, lateral right, and sunrise bilateral knees) were ordered and reviewed for evaluation of right knee replacement. They demonstrate a well positioned, well aligned knee replacement without complicating factors noted. In comparison with previous films there has been no change.    ASSESSMENT:   Annual follow up right knee replacement. doing well       PLAN:    Continue activities as tolerated  Follow up in 1 year      Mathew Sloan MD  10/31/2019

## 2019-11-01 RX ORDER — HYDROCHLOROTHIAZIDE 25 MG/1
TABLET ORAL
Qty: 90 TABLET | Refills: 1 | Status: SHIPPED | OUTPATIENT
Start: 2019-11-01 | End: 2020-02-21 | Stop reason: SDUPTHER

## 2019-11-01 RX ORDER — LOSARTAN POTASSIUM 50 MG/1
TABLET ORAL
Qty: 90 TABLET | Refills: 1 | Status: SHIPPED | OUTPATIENT
Start: 2019-11-01 | End: 2020-02-21 | Stop reason: SDUPTHER

## 2019-11-22 ENCOUNTER — TELEPHONE (OUTPATIENT)
Dept: CARDIOLOGY | Facility: CLINIC | Age: 69
End: 2019-11-22

## 2019-11-22 RX ORDER — METOPROLOL SUCCINATE 25 MG/1
12.5 TABLET, EXTENDED RELEASE ORAL DAILY
Qty: 45 TABLET | Refills: 1 | Status: SHIPPED | OUTPATIENT
Start: 2019-11-22 | End: 2020-02-21 | Stop reason: SDUPTHER

## 2019-11-22 NOTE — TELEPHONE ENCOUNTER
Mrs Tafoya is needing a refill    Metoprolol  25 mg- take a half tablet daily  90 day supply    [phone]    [facility] fax [phone]

## 2019-12-18 ENCOUNTER — CLINICAL SUPPORT (OUTPATIENT)
Dept: CARDIOLOGY | Facility: CLINIC | Age: 69
End: 2019-12-18

## 2019-12-18 DIAGNOSIS — R00.1 BRADYCARDIA, SINUS: ICD-10-CM

## 2019-12-18 DIAGNOSIS — I51.89 DIASTOLIC DYSFUNCTION: ICD-10-CM

## 2019-12-18 PROCEDURE — 93296 REM INTERROG EVL PM/IDS: CPT | Performed by: INTERNAL MEDICINE

## 2019-12-18 PROCEDURE — 93294 REM INTERROG EVL PM/LDLS PM: CPT | Performed by: INTERNAL MEDICINE

## 2019-12-19 ENCOUNTER — OFFICE VISIT (OUTPATIENT)
Dept: CARDIOLOGY | Facility: CLINIC | Age: 69
End: 2019-12-19

## 2019-12-19 VITALS
HEART RATE: 77 BPM | BODY MASS INDEX: 37.03 KG/M2 | HEIGHT: 63 IN | WEIGHT: 209 LBS | DIASTOLIC BLOOD PRESSURE: 72 MMHG | SYSTOLIC BLOOD PRESSURE: 108 MMHG

## 2019-12-19 DIAGNOSIS — I10 ESSENTIAL HYPERTENSION: ICD-10-CM

## 2019-12-19 DIAGNOSIS — E78.5 DYSLIPIDEMIA: ICD-10-CM

## 2019-12-19 DIAGNOSIS — I51.89 DIASTOLIC DYSFUNCTION: ICD-10-CM

## 2019-12-19 DIAGNOSIS — I45.10 RBBB: ICD-10-CM

## 2019-12-19 DIAGNOSIS — Z95.0 PRESENCE OF CARDIAC PACEMAKER: Primary | ICD-10-CM

## 2019-12-19 PROCEDURE — 93288 INTERROG EVL PM/LDLS PM IP: CPT | Performed by: INTERNAL MEDICINE

## 2019-12-19 PROCEDURE — 99213 OFFICE O/P EST LOW 20 MIN: CPT | Performed by: INTERNAL MEDICINE

## 2019-12-19 RX ORDER — VITAMIN B COMPLEX
CAPSULE ORAL DAILY
COMMUNITY

## 2019-12-19 RX ORDER — OMEPRAZOLE 10 MG/1
1 CAPSULE, DELAYED RELEASE ORAL DAILY
COMMUNITY
Start: 2019-11-01

## 2019-12-19 NOTE — PROGRESS NOTES
Subjective:     Encounter Date:12/19/2019      Patient ID: Chanell Tafoya is a 69 y.o. female.    Chief Complaint:  Chief Complaint   Patient presents with   • Coronary Artery Disease       HPI:  History of Present Illness  I saw Ms. Tafoya in the office today.  She is a very pleasant 69-year-old female with a history of essential hypertension, symptomatic bradycardia, permanent pacemaker and dyslipidemia.  She has sleep apnea and uses CPAP.  She has GERD.  She has a right bundle branch block.  She has nonobstructive coronary disease.    Ms. Tafoya is in the office today for continued evaluation of her hypertension and permanent pacemaker.  She states that she does have some mild ankle edema which is resolved by morning.  She is not complaining of chest discomfort or shortness of air.  She recently had an endoscopy with biopsy.  She has a history of Villegas's esophagus.  She does monitor her blood pressure routinely.  She states that intermittently she will feel unusual as if she is sweating, but she is not.  When she checks her blood pressure at this time it will generally be around 100 mmHg.  This is not often.  Most of the time her blood pressure is within normal limits.  She denies orthopnea or paroxysmal nocturnal dyspnea.  Interrogation of her pacemaker today revealed normal function and no episodes of atrial fibrillation she is paced in the atrium approximately 95% of time and in the ventricle 98% of the time.    The following portions of the patient's history were reviewed and updated as appropriate: allergies, current medications, past family history, past medical history, past social history, past surgical history and problem list.    Problem List:  Patient Active Problem List   Diagnosis   • Primary osteoarthritis of right knee   • Chest pain   • Constipation   • Difficulty walking   • Presence of cardiac pacemaker   • RBBB   • Dyslipidemia   • GERD (gastroesophageal reflux disease)   • Bradycardia,  sinus   • Obstructive sleep apnea on CPAP   • Essential hypertension   • Diastolic dysfunction       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   • GERD (gastroesophageal reflux disease)    • Heart murmur    • History of cellulitis    • History of kidney stones    • History of staph infection     IN FOOT AFTER SURGERY   • Hyperlipidemia    • Hypertension    • Nonocclusive coronary atherosclerosis of native coronary artery     7/2015: Catheter: Left main normal. Proximal LAD with luminal irregularities; circumflex and right coronary artery normal. EF 55%   • Osteoarthritis    • Sarcoidosis    • Sleep apnea     cpap       Past Surgical History:  Past Surgical History:   Procedure Laterality Date   • APPENDECTOMY  1981   • CARDIAC CATHETERIZATION      7/2015: Catheter: Left main normal. Proximal LAD with luminal irregularities; circumflex and right coronary artery normal. EF 55%   • CHOLECYSTECTOMY  1998   • COLONOSCOPY     • ENDOSCOPY     • ESOPHAGEAL DILATATION  2015   • FOOT SURGERY Right 2012    3 screws   • HAND SURGERY Left 1993    thumb reconstruction    • HAND SURGERY Right 2007    thumb reconstruction   • HYSTERECTOMY  1981   • INSERT / REPLACE / REMOVE PACEMAKER     • KIDNEY STONE SURGERY  2006   • KNEE SURGERY Right 2006    torn meniscus    • LUNG SURGERY Right 2001    lymph node biopsied (sarcoidosis)   • TONSILLECTOMY AND ADENOIDECTOMY  1955   • TOTAL KNEE ARTHROPLASTY Right 10/1/2018    Procedure: TOTAL KNEE ARTHROPLASTY;  Surgeon: Mathew Sloan MD;  Location: [facility];  Service: Orthopedics       Social History:  Social History     Socioeconomic History   • Marital status:      Spouse name: Not on file   • Number of children: Not on file   • Years of education: Not on file   • Highest education level: Not on file   Tobacco Use   • Smoking status: Never Smoker   • Smokeless tobacco: Never Used   Substance and Sexual  Activity   • Alcohol use: No   • Drug use: No   • Sexual activity: Defer       Allergies:  Allergies   Allergen Reactions   • Amitriptyline Shortness Of Breath     Anaphylactic episode   • Asa [Aspirin] Other (See Comments)     Severe abdominal pain   • Azithromycin Other (See Comments)   • Clindamycin/Lincomycin Other (See Comments)     Blisters and erosions in esophagus & stomach   • Codeine Other (See Comments)     In large amounts causes headaches   • Dilaudid [Hydromorphone]      Causes chest pains/breathing problems when given by IV   • Fluconazole Other (See Comments)   • Gabapentin Diarrhea   • Ibuprofen Swelling     Swelling of hands and legs   • Lubiprostone Other (See Comments)   • Naproxen      Blisters in mouth/stomach   • Other      Surgical tape causes blisters     • Talwin [Pentazocine] Itching       Immunizations:    There is no immunization history on file for this patient.    ROS:  Review of Systems   Constitution: Negative for chills, decreased appetite, fever, malaise/fatigue, weight gain and weight loss.   HENT: Negative for congestion, hoarse voice, nosebleeds and sore throat.    Eyes: Negative for blurred vision, double vision and visual disturbance.   Cardiovascular: Positive for leg swelling. Negative for chest pain, claudication, dyspnea on exertion, irregular heartbeat, near-syncope, orthopnea, palpitations, paroxysmal nocturnal dyspnea and syncope.   Respiratory: Negative for cough, hemoptysis, shortness of breath, sleep disturbances due to breathing, snoring, sputum production and wheezing.    Endocrine: Negative for cold intolerance, heat intolerance, polydipsia and polyuria.   Hematologic/Lymphatic: Negative for adenopathy and bleeding problem. Does not bruise/bleed easily.   Skin: Negative for flushing, itching, nail changes and rash.   Musculoskeletal: Positive for arthritis. Negative for back pain, joint pain, muscle cramps, muscle weakness, myalgias and neck pain.  "  Gastrointestinal: Positive for bloating and heartburn. Negative for abdominal pain, anorexia, change in bowel habit, constipation, diarrhea, hematemesis, hematochezia, jaundice, melena, nausea and vomiting.   Genitourinary: Negative for dysuria, hematuria and nocturia.   Neurological: Negative for brief paralysis, disturbances in coordination, excessive daytime sleepiness, headaches, light-headedness, loss of balance, numbness, paresthesias, seizures and vertigo.   Psychiatric/Behavioral: Negative for altered mental status and depression. The patient is not nervous/anxious.    Allergic/Immunologic: Negative for environmental allergies and hives.          Objective:         /72   Pulse 77   Ht 160 cm (63\")   Wt 94.8 kg (209 lb)   BMI 37.02 kg/m²     Physical Exam   Constitutional: She is oriented to person, place, and time. She appears well-developed and well-nourished. No distress.   HENT:   Head: Normocephalic and atraumatic.   Mouth/Throat: Oropharynx is clear and moist.   Eyes: Pupils are equal, round, and reactive to light. Conjunctivae and EOM are normal. No scleral icterus.   Neck: Normal range of motion. Neck supple. No thyromegaly present.   Cardiovascular: Normal rate, regular rhythm, S1 normal, S2 normal and intact distal pulses.  No extrasystoles are present. PMI is not displaced. Exam reveals no gallop, no S3, no S4, no friction rub and no decreased pulses.   Murmur ( There is a 1/6 systolic murmur heard at the left lower sternal border) heard.  Pulses:       Carotid pulses are 2+ on the right side, and 2+ on the left side.       Dorsalis pedis pulses are 2+ on the right side, and 2+ on the left side.        Posterior tibial pulses are 2+ on the right side, and 2+ on the left side.   Pulmonary/Chest: Effort normal and breath sounds normal. No respiratory distress. She has no wheezes. She has no rales.   Abdominal: Soft. Bowel sounds are normal. She exhibits no distension and no mass. There is " no tenderness. There is no rebound and no guarding.   Musculoskeletal: Normal range of motion. She exhibits edema.   Lymphadenopathy:     She has no cervical adenopathy.   Neurological: She is alert and oriented to person, place, and time. Coordination normal.   Skin: Skin is warm and dry. No rash noted. She is not diaphoretic. No pallor.   Psychiatric: She has a normal mood and affect. Her behavior is normal.   Nursing note and vitals reviewed.      In-Office Procedure(s):  Procedures    ASCVD RIsk Score::  The 10-year ASCVD risk score (Romyirasema LINDSEY Jr., et al., 2013) is: 9.1%    Values used to calculate the score:      Age: 69 years      Sex: Female      Is Non- : No      Diabetic: No      Tobacco smoker: No      Systolic Blood Pressure: 108 mmHg      Is BP treated: Yes      HDL Cholesterol: 59 mg/dL      Total Cholesterol: 212 mg/dL    Recent Radiology:  Imaging Results (Most Recent)     None          Lab Review:   not applicable             Assessment:          Diagnosis Plan   1. Presence of cardiac pacemaker     2. Essential hypertension     3. RBBB     4. Dyslipidemia     5. Diastolic dysfunction            Plan:      1.  Dual-chamber pacemaker.  Interrogation of her device reveals normal function.  No atrial arrhythmias.  No ventricular arrhythmias.  She is 4.3 years remaining on her battery life.  She is pacing approximately 98-1/2% of the time    2.  Essential hypertension  Her blood pressure is well controlled.  She does have periods where her pressure is mildly decreased but these are not common.  I have expressed to her that I am concerned that if we decrease her medication further, her blood pressure will increase significantly.    3.  Right bundle branch block  Stable    4.  Dyslipidemia  Recent lab work from Dr. Batista's office in December 2019 revealed total cholesterol 222, HDL 64, triglycerides 90 and .    5.  Diastolic dysfunction  Grade 1 by echo in June 2019    6.   Borderline ejection fraction  Her ejection fraction and June 2019 was 51 to 55%.  I will recheck her echo before her next visit      Level of Care:                 Malu Patten MD  12/19/19  .

## 2020-02-21 RX ORDER — METOPROLOL SUCCINATE 25 MG/1
12.5 TABLET, EXTENDED RELEASE ORAL DAILY
Qty: 45 TABLET | Refills: 1 | Status: SHIPPED | OUTPATIENT
Start: 2020-02-21 | End: 2020-03-02 | Stop reason: SDUPTHER

## 2020-02-21 RX ORDER — HYDROCHLOROTHIAZIDE 25 MG/1
25 TABLET ORAL DAILY
Qty: 90 TABLET | Refills: 1 | Status: SHIPPED | OUTPATIENT
Start: 2020-02-21 | End: 2020-03-02 | Stop reason: SDUPTHER

## 2020-02-21 RX ORDER — LOSARTAN POTASSIUM 25 MG/1
25 TABLET ORAL DAILY
Qty: 90 TABLET | Refills: 1 | Status: SHIPPED | OUTPATIENT
Start: 2020-02-21 | End: 2020-03-02 | Stop reason: SDUPTHER

## 2020-03-02 RX ORDER — HYDROCHLOROTHIAZIDE 25 MG/1
25 TABLET ORAL DAILY
Qty: 90 TABLET | Refills: 1 | Status: SHIPPED | OUTPATIENT
Start: 2020-03-02 | End: 2020-10-29

## 2020-03-02 RX ORDER — METOPROLOL SUCCINATE 25 MG/1
12.5 TABLET, EXTENDED RELEASE ORAL DAILY
Qty: 45 TABLET | Refills: 1 | Status: SHIPPED | OUTPATIENT
Start: 2020-03-02 | End: 2020-10-29

## 2020-03-02 RX ORDER — LOSARTAN POTASSIUM 25 MG/1
25 TABLET ORAL DAILY
Qty: 90 TABLET | Refills: 1 | Status: SHIPPED | OUTPATIENT
Start: 2020-03-02 | End: 2020-08-18

## 2020-03-25 ENCOUNTER — TELEPHONE (OUTPATIENT)
Dept: CARDIOLOGY | Facility: CLINIC | Age: 70
End: 2020-03-25

## 2020-03-25 NOTE — TELEPHONE ENCOUNTER
Ms Chanell Tafoya is trying to scan her [employer] pace maker device. When she scans , it states 1 bar(batterly life left) turn red and wont scan- shows 20 % left (flashing orange). Requesting a return call.      [phone]

## 2020-03-25 NOTE — TELEPHONE ENCOUNTER
Can you call this patient, I have no idea what to tell her or should I have her call the company?

## 2020-03-25 NOTE — TELEPHONE ENCOUNTER
Patient will need to check with [employer] re: this, Lvm asking patient to call them for an answer. dmk

## 2020-03-25 NOTE — TELEPHONE ENCOUNTER
PT called [employer] and they are sending her a new monitor in 7-10 business days, then she will send in her remote device check.    She also asked about the price, if this will cost her anything?

## 2020-05-26 ENCOUNTER — TELEPHONE (OUTPATIENT)
Dept: CARDIOLOGY | Facility: CLINIC | Age: 70
End: 2020-05-26

## 2020-05-26 NOTE — TELEPHONE ENCOUNTER
DR Toribio PT    PT has a bedside monitor ([employer]) and it has went off a few times.  The last 2/3 weeks PT not feeling well, Palpitations, SOB worse when eats or after eats. Dizzy, HA and crying.  PT has not been around covid or has covid symptoms.

## 2020-06-09 ENCOUNTER — OFFICE VISIT (OUTPATIENT)
Dept: CARDIOLOGY | Facility: CLINIC | Age: 70
End: 2020-06-09

## 2020-06-09 ENCOUNTER — TELEPHONE (OUTPATIENT)
Dept: CARDIOLOGY | Facility: CLINIC | Age: 70
End: 2020-06-09

## 2020-06-09 VITALS
HEIGHT: 63 IN | WEIGHT: 213 LBS | SYSTOLIC BLOOD PRESSURE: 114 MMHG | HEART RATE: 72 BPM | BODY MASS INDEX: 37.74 KG/M2 | DIASTOLIC BLOOD PRESSURE: 72 MMHG

## 2020-06-09 DIAGNOSIS — Z95.0 PRESENCE OF CARDIAC PACEMAKER: ICD-10-CM

## 2020-06-09 DIAGNOSIS — E78.5 DYSLIPIDEMIA: ICD-10-CM

## 2020-06-09 DIAGNOSIS — R07.89 CHEST PAIN, ATYPICAL: Primary | ICD-10-CM

## 2020-06-09 DIAGNOSIS — I10 ESSENTIAL HYPERTENSION: ICD-10-CM

## 2020-06-09 PROCEDURE — 93288 INTERROG EVL PM/LDLS PM IP: CPT | Performed by: INTERNAL MEDICINE

## 2020-06-09 PROCEDURE — 99214 OFFICE O/P EST MOD 30 MIN: CPT | Performed by: INTERNAL MEDICINE

## 2020-06-09 NOTE — PROGRESS NOTES
Subjective:     Encounter Date:06/09/2020      Patient ID: Chanell Tafoya is a 69 y.o. female.    Chief Complaint:  Chief Complaint   Patient presents with   • Coronary Artery Disease       HPI:  History of Present Illness     I saw Ms. Tafoya in the office today. .  She is a  pleasant 69-year-old female with a history of essential hypertension, symptomatic bradycardia, permanent pacemaker and dyslipidemia.  She has sleep apnea and uses CPAP.  She has GERD.  She has a right bundle branch block.  She has nonobstructive coronary disease.  She presents to the office today with 4 pages of activities and complaints.  Most of these complaints appear to be noncardiac in etiology.  She will discuss with Dr. Batista.  She does have a burning sensation in her chest with a duration of 3 seconds.  It is not precipitated by   exertion.  It occurs spontaneously.  There are no measures that will cause termination of the symptoms.  She has intermittent palpitations however interrogation of her pacemaker does not show any significant arrhythmias.  She states that at times she feels as if she is hypotensive, but she describes hypotension as her blood pressure today of 114/72.  I did explain that this is not considered hypotensive but actually an ideal reading.  We did do orthostatic checks in the office today.  She had approximately an 8 mm drop when she went from sitting to standing.  Nevertheless she states that on occasion her systolic pressure will be in the 100 range and she will feel unsteady and off balance.  She has occasional lower extremity edema but this is generally resolved by morning when she gets out of bed.      The following portions of the patient's history were reviewed and updated as appropriate: allergies, current medications, past family history, past medical history, past social history, past surgical history and problem list.    Problem List:  Patient Active Problem List   Diagnosis   • Primary  osteoarthritis of right knee   • Chest pain, atypical   • Constipation   • Difficulty walking   • Presence of cardiac pacemaker   • RBBB   • Dyslipidemia   • GERD (gastroesophageal reflux disease)   • Bradycardia, sinus   • Obstructive sleep apnea on CPAP   • Essential hypertension   • Diastolic dysfunction       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   • GERD (gastroesophageal reflux disease)    • Heart murmur    • History of cellulitis    • History of kidney stones    • History of staph infection     IN FOOT AFTER SURGERY   • Hyperlipidemia    • Hypertension    • Nonocclusive coronary atherosclerosis of native coronary artery     7/2015: Catheter: Left main normal. Proximal LAD with luminal irregularities; circumflex and right coronary artery normal. EF 55%   • Osteoarthritis    • Sarcoidosis    • Sleep apnea     cpap       Past Surgical History:  Past Surgical History:   Procedure Laterality Date   • APPENDECTOMY  1981   • CARDIAC CATHETERIZATION      7/2015: Catheter: Left main normal. Proximal LAD with luminal irregularities; circumflex and right coronary artery normal. EF 55%   • CHOLECYSTECTOMY  1998   • COLONOSCOPY     • ENDOSCOPY     • ESOPHAGEAL DILATATION  2015   • FOOT SURGERY Right 2012    3 screws   • HAND SURGERY Left 1993    thumb reconstruction    • HAND SURGERY Right 2007    thumb reconstruction   • HYSTERECTOMY  1981   • INSERT / REPLACE / REMOVE PACEMAKER     • KIDNEY STONE SURGERY  2006   • KNEE SURGERY Right 2006    torn meniscus    • LUNG SURGERY Right 2001    lymph node biopsied (sarcoidosis)   • TONSILLECTOMY AND ADENOIDECTOMY  1955   • TOTAL KNEE ARTHROPLASTY Right 10/1/2018    Procedure: TOTAL KNEE ARTHROPLASTY;  Surgeon: Mathew Sloan MD;  Location: [facility];  Service: Orthopedics       Social History:  Social History     Socioeconomic History   • Marital status:      Spouse name: Not on file   •  Number of children: Not on file   • Years of education: Not on file   • Highest education level: Not on file   Tobacco Use   • Smoking status: Never Smoker   • Smokeless tobacco: Never Used   Substance and Sexual Activity   • Alcohol use: No   • Drug use: No   • Sexual activity: Defer       Allergies:  Allergies   Allergen Reactions   • Amitriptyline Shortness Of Breath     Anaphylactic episode   • Asa [Aspirin] Other (See Comments)     Severe abdominal pain   • Azithromycin Other (See Comments)   • Clindamycin/Lincomycin Other (See Comments)     Blisters and erosions in esophagus & stomach   • Codeine Other (See Comments)     In large amounts causes headaches   • Dilaudid [Hydromorphone]      Causes chest pains/breathing problems when given by IV   • Fluconazole Other (See Comments)   • Gabapentin Diarrhea   • Ibuprofen Swelling     Swelling of hands and legs   • Lubiprostone Other (See Comments)   • Naproxen      Blisters in mouth/stomach   • Other      Surgical tape causes blisters     • Talwin [Pentazocine] Itching       Immunizations:    There is no immunization history on file for this patient.    ROS:  Review of Systems   Constitution: Positive for malaise/fatigue. Negative for chills, decreased appetite, fever, weight gain and weight loss.   HENT: Negative for congestion, hoarse voice, nosebleeds and sore throat.    Eyes: Negative for blurred vision, double vision and visual disturbance.   Cardiovascular: Positive for chest pain and leg swelling. Negative for claudication, dyspnea on exertion, irregular heartbeat, near-syncope, orthopnea, palpitations, paroxysmal nocturnal dyspnea and syncope.   Respiratory: Negative for cough, hemoptysis, shortness of breath, sleep disturbances due to breathing, snoring, sputum production and wheezing.    Endocrine: Negative for cold intolerance, heat intolerance, polydipsia and polyuria.   Hematologic/Lymphatic: Negative for adenopathy and bleeding problem. Does not  "bruise/bleed easily.   Skin: Negative for flushing, itching, nail changes and rash.   Musculoskeletal: Positive for arthritis. Negative for back pain, joint pain, muscle cramps, muscle weakness, myalgias and neck pain.   Gastrointestinal: Positive for heartburn. Negative for abdominal pain, anorexia, change in bowel habit, constipation, diarrhea, hematemesis, hematochezia, jaundice, melena, nausea and vomiting.   Genitourinary: Negative for dysuria, hematuria and nocturia.   Neurological: Positive for light-headedness and loss of balance. Negative for brief paralysis, disturbances in coordination, excessive daytime sleepiness, headaches, numbness, paresthesias, seizures and vertigo.   Psychiatric/Behavioral: Negative for altered mental status and depression. The patient is not nervous/anxious.    Allergic/Immunologic: Negative for environmental allergies and hives.          Objective:         /72   Pulse 72   Ht 160 cm (63\")   Wt 96.6 kg (213 lb)   BMI 37.73 kg/m²     Physical Exam   Constitutional: She is oriented to person, place, and time. She appears well-developed and well-nourished. No distress.   HENT:   Head: Normocephalic and atraumatic.   Mouth/Throat: Oropharynx is clear and moist.   Eyes: Pupils are equal, round, and reactive to light. Conjunctivae and EOM are normal. No scleral icterus.   Neck: Normal range of motion. Neck supple. No thyromegaly present.   Cardiovascular: Normal rate, regular rhythm, S1 normal, S2 normal and intact distal pulses.  No extrasystoles are present. PMI is not displaced. Exam reveals no gallop, no S3, no S4, no friction rub and no decreased pulses.   Murmur ( There is a 1/6 systolic murmur heard at the left lower sternal border) heard.  Pulses:       Carotid pulses are 2+ on the right side, and 2+ on the left side.       Dorsalis pedis pulses are 2+ on the right side, and 2+ on the left side.        Posterior tibial pulses are 2+ on the right side, and 2+ on the " left side.   Pulmonary/Chest: Effort normal and breath sounds normal. No respiratory distress. She has no wheezes. She has no rales.   Abdominal: Soft. Bowel sounds are normal. She exhibits no distension and no mass. There is no tenderness. There is no rebound and no guarding.   Musculoskeletal: Normal range of motion. Edema:  There is trivial lower extremity edema.   Lymphadenopathy:     She has no cervical adenopathy.   Neurological: She is alert and oriented to person, place, and time. Coordination normal.   Skin: Skin is warm and dry. No rash noted. She is not diaphoretic. No pallor.   Psychiatric: She has a normal mood and affect. Her behavior is normal.   Nursing note and vitals reviewed.      In-Office Procedure(s):  Procedures    ASCVD RIsk Score::  The 10-year ASCVD risk score (Romy LINDSEY Jr., et al., 2013) is: 10.1%    Values used to calculate the score:      Age: 69 years      Sex: Female      Is Non- : No      Diabetic: No      Tobacco smoker: No      Systolic Blood Pressure: 114 mmHg      Is BP treated: Yes      HDL Cholesterol: 59 mg/dL      Total Cholesterol: 212 mg/dL    Recent Radiology:  Imaging Results (Most Recent)     None          Lab Review:   not applicable             Assessment:          Diagnosis Plan   1. Chest pain, atypical  Adult Transthoracic Echo Complete W/ Cont if Necessary Per Protocol   2. Essential hypertension     3. Presence of cardiac pacemaker     4. Dyslipidemia            Plan:   I have asked Ms. Tafoya to decrease her losartan to 12.5 mg daily.  Hopefully her blood pressure will not increase significantly and will prevent that shifts that she feels may be causing some of her unsteadiness.  Interrogation of her pacemaker reveals appropriate function.  She has 4.6 years remaining on her battery.  She has had no episodes of atrial fibrillation and no significant arrhythmias.  She paces the atrium approximately 90 to 25% of the time in the ventricle 97.9%  of the time.  She would like to follow-up with Dr. Farfan.  I will repeat her echocardiogram with saline contrast        Level of Care:                 Malu Patten MD  06/09/20  .

## 2020-06-09 NOTE — TELEPHONE ENCOUNTER
Dr Malu Patten    Ms Chanell Tafoya is calling back medication information from todays visit.  Dr Patten requested.  Ms Tafoya has 2 bottles of Losartan at home one bottle of 25 mg and one bottle 50 mg (the 50 mg she cuts in half). Ms Patricia wants to know if she can use this medication filled Feb 2020. Requesting a return call    [phone]

## 2020-06-15 ENCOUNTER — OFFICE VISIT (OUTPATIENT)
Dept: CARDIOLOGY | Facility: CLINIC | Age: 70
End: 2020-06-15

## 2020-06-15 VITALS
HEART RATE: 72 BPM | HEIGHT: 63 IN | SYSTOLIC BLOOD PRESSURE: 124 MMHG | WEIGHT: 214 LBS | DIASTOLIC BLOOD PRESSURE: 72 MMHG | RESPIRATION RATE: 16 BRPM | BODY MASS INDEX: 37.92 KG/M2

## 2020-06-15 DIAGNOSIS — R00.1 BRADYCARDIA, SINUS: Primary | ICD-10-CM

## 2020-06-15 DIAGNOSIS — Z95.0 PRESENCE OF CARDIAC PACEMAKER: ICD-10-CM

## 2020-06-15 PROCEDURE — 99212 OFFICE O/P EST SF 10 MIN: CPT | Performed by: INTERNAL MEDICINE

## 2020-06-15 PROCEDURE — 93288 INTERROG EVL PM/LDLS PM IP: CPT | Performed by: INTERNAL MEDICINE

## 2020-06-15 NOTE — PROGRESS NOTES
Subjective:     Encounter Date:06/15/2020      Patient ID: Chanell Tafoya is a 69 y.o. female.    Chief Complaint:  Pacemaker followup.    HPI:  Patient presents for pacemaker followup.  Ms Tafoya is a 68 yo patient of Dr. Malu Patten with a past medical history significant for HTN, HLD, CORINA on CPAP who had a pacemaker implanted in 2018 for AV block.  She denies any symptoms of dizziness or syncope but does complain of fatigue.  Her dose of metoprolol was reduced by Dr. Patten which has helped some.      The following portions of the patient's history were reviewed and updated as appropriate: allergies, current medications, past family history, past medical history, past social history, past surgical history and problem list.    Problem List:  Patient Active Problem List   Diagnosis   • Primary osteoarthritis of right knee   • Chest pain, atypical   • Constipation   • Difficulty walking   • Presence of cardiac pacemaker   • RBBB   • Dyslipidemia   • GERD (gastroesophageal reflux disease)   • Bradycardia, sinus   • Obstructive sleep apnea on CPAP   • Essential hypertension   • Diastolic dysfunction       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   • GERD (gastroesophageal reflux disease)    • Heart murmur    • History of cellulitis    • History of kidney stones    • History of staph infection     IN FOOT AFTER SURGERY   • Hyperlipidemia    • Hypertension    • Nonocclusive coronary atherosclerosis of native coronary artery     7/2015: Catheter: Left main normal. Proximal LAD with luminal irregularities; circumflex and right coronary artery normal. EF 55%   • Osteoarthritis    • Sarcoidosis    • Sleep apnea     cpap       Past Surgical History:  Past Surgical History:   Procedure Laterality Date   • APPENDECTOMY  1981   • CARDIAC CATHETERIZATION      7/2015: Catheter: Left main normal. Proximal LAD with luminal irregularities; circumflex  and right coronary artery normal. EF 55%   • CHOLECYSTECTOMY  1998   • COLONOSCOPY     • ENDOSCOPY     • ESOPHAGEAL DILATATION  2015   • FOOT SURGERY Right 2012    3 screws   • HAND SURGERY Left 1993    thumb reconstruction    • HAND SURGERY Right 2007    thumb reconstruction   • HYSTERECTOMY  1981   • INSERT / REPLACE / REMOVE PACEMAKER     • KIDNEY STONE SURGERY  2006   • KNEE SURGERY Right 2006    torn meniscus    • LUNG SURGERY Right 2001    lymph node biopsied (sarcoidosis)   • PACEMAKER IMPLANTATION  04/23/2018    [employer]   • TONSILLECTOMY AND ADENOIDECTOMY  1955   • TOTAL KNEE ARTHROPLASTY Right 10/1/2018    Procedure: TOTAL KNEE ARTHROPLASTY;  Surgeon: Mathew Sloan MD;  Location: [facility] OR;  Service: Orthopedics       Social History:  Social History     Socioeconomic History   • Marital status:      Spouse name: Not on file   • Number of children: Not on file   • Years of education: Not on file   • Highest education level: Not on file   Tobacco Use   • Smoking status: Never Smoker   • Smokeless tobacco: Never Used   Substance and Sexual Activity   • Alcohol use: No   • Drug use: No   • Sexual activity: Defer       Allergies:  Allergies   Allergen Reactions   • Amitriptyline Shortness Of Breath     Anaphylactic episode   • Asa [Aspirin] Other (See Comments)     Severe abdominal pain   • Azithromycin Other (See Comments)   • Clindamycin/Lincomycin Other (See Comments)     Blisters and erosions in esophagus & stomach   • Codeine Other (See Comments)     In large amounts causes headaches   • Dilaudid [Hydromorphone]      Causes chest pains/breathing problems when given by IV   • Fluconazole Other (See Comments)   • Gabapentin Diarrhea   • Ibuprofen Swelling     Swelling of hands and legs   • Lubiprostone Other (See Comments)   • Naproxen      Blisters in mouth/stomach   • Other      Surgical tape causes blisters     • Talwin [Pentazocine] Itching       Immunizations:    There is no immunization  "history on file for this patient.    ROS:  Review of Systems   Constitution: Positive for malaise/fatigue.   Cardiovascular: Negative for chest pain, dyspnea on exertion, irregular heartbeat, leg swelling, near-syncope, orthopnea, palpitations, paroxysmal nocturnal dyspnea and syncope.   Respiratory: Negative for shortness of breath.    Psychiatric/Behavioral: The patient is nervous/anxious.    All other systems reviewed and are negative.         Objective:         /72   Pulse 72   Resp 16   Ht 160 cm (63\")   Wt 97.1 kg (214 lb)   BMI 37.91 kg/m²     Physical Exam   Constitutional: She is oriented to person, place, and time. She appears well-developed and well-nourished. No distress.   HENT:   Head: Normocephalic and atraumatic.   Eyes: Pupils are equal, round, and reactive to light. Conjunctivae and EOM are normal. No scleral icterus.   Neck: Normal range of motion. No thyromegaly present.   Cardiovascular: Normal rate, regular rhythm and normal heart sounds.   Pulmonary/Chest: Effort normal and breath sounds normal.   Abdominal: Soft. Bowel sounds are normal.   Musculoskeletal: Normal range of motion.   Neurological: She is alert and oriented to person, place, and time.   Skin: Skin is warm and dry.   Psychiatric: She has a normal mood and affect.       In-Office Procedure(s):  Procedures Pacemaker Eval interpreted by me  MDT [id number]  Battery 4.5 yrs to christiana    P wave 1mv  Threshold 0.5V  Impedance 380 ohms    R wave 1.9mv  Threhsold 2.1V  Impedance 399 ohms    Events - no AF or VT    ASCVD RIsk Score::  The 10-year ASCVD risk score ([place] ROSALIA Jr., et al., 2013) is: 11.9%    Values used to calculate the score:      Age: 69 years      Sex: Female      Is Non- : No      Diabetic: No      Tobacco smoker: No      Systolic Blood Pressure: 124 mmHg      Is BP treated: Yes      HDL Cholesterol: 59 mg/dL      Total Cholesterol: 212 mg/dL    Recent Radiology:  Imaging Results (Most Recent)  "    None          Lab Review:   No visits with results within 2 Month(s) from this visit.   Latest known visit with results is:   Hospital Outpatient Visit on 06/28/2019   Component Date Value   • BSA 06/28/2019 36.7    • IVSd 06/28/2019 1.0    • LVIDd 06/28/2019 5.2    • LVIDs 06/28/2019 4.1    • LVPWd 06/28/2019 0.8    • IVS/LVPW 06/28/2019 1.3    • FS 06/28/2019 21.2    • EDV(Teich) 06/28/2019 129.5    • ESV(Teich) 06/28/2019 74.2    • EF(Teich) 06/28/2019 42.7    • EDV(cubed) 06/28/2019 140.6    • ESV(cubed) 06/28/2019 68.9    • EF(cubed) 06/28/2019 51.0    • LV mass(C)d 06/28/2019 169.0    • LV mass(C)dI 06/28/2019 4.6    • SV(Teich) 06/28/2019 55.3    • SI(Teich) 06/28/2019 1.5    • SV(cubed) 06/28/2019 71.7    • SI(cubed) 06/28/2019 2.0    • asc Aorta Diam 06/28/2019 3.1    • LVOT diam 06/28/2019 2.2    • LVOT area 06/28/2019 3.8    • LVOT area(traced) 06/28/2019 3.8    • RVOT diam 06/28/2019 2.6    • RVOT area 06/28/2019 5.3    • LVLd ap4 06/28/2019 8.3    • EDV(MOD-sp4) 06/28/2019 56.0    • LVLs ap4 06/28/2019 7.8    • ESV(MOD-sp4) 06/28/2019 33.0    • EF(MOD-sp4) 06/28/2019 41.1    • LVLd ap2 06/28/2019 8.3    • EDV(MOD-sp2) 06/28/2019 63.0    • LVLs ap2 06/28/2019 8.1    • ESV(MOD-sp2) 06/28/2019 35.0    • EF(MOD-sp2) 06/28/2019 44.4    • SV(MOD-sp4) 06/28/2019 23.0    • SI(MOD-sp4) 06/28/2019 0.63    • SV(MOD-sp2) 06/28/2019 28.0    • SI(MOD-sp2) 06/28/2019 0.76    • LV Johnson Vol (BSA correct* 06/28/2019 1.5    • LV Sys Vol (BSA correcte* 06/28/2019 0.9    • MV A dur 06/28/2019 127.0    • MV E max ellen 06/28/2019 72.3    • MV A max ellen 06/28/2019 86.2    • MV E/A 06/28/2019 0.84    • MV V2 mean 06/28/2019 53.9    • MV mean PG 06/28/2019 1.0    • MV V2 VTI 06/28/2019 28.2    • MVA(VTI) 06/28/2019 2.8    • MV P1/2t max ellen 06/28/2019 97.0    • MV P1/2t 06/28/2019 56.3    • MVA(P1/2t) 06/28/2019 3.9    • MV dec slope 06/28/2019 505.0    • MV dec time 06/28/2019 130    • Ao V2 mean 06/28/2019 98.0    • Ao mean  PG 06/28/2019 4.0    • Ao mean PG (full) 06/28/2019 2.0    • Ao V2 VTI 06/28/2019 35.6    • RAMIRO(I,A) 06/28/2019 2.2    • RAMIRO(I,D) 06/28/2019 2.2    • LV V1 mean PG 06/28/2019 2.0    • LV V1 mean 06/28/2019 61.3    • LV V1 VTI 06/28/2019 20.9    • SV(LVOT) 06/28/2019 79.4    • SV(RVOT) 06/28/2019 69.0    • SI(LVOT) 06/28/2019 2.2    • PA V2 max 06/28/2019 106.0    • PA max PG 06/28/2019 4.5    • PA max PG (full) 06/28/2019 3.3    •  CV ECHO DAYLIN - PVA(V,* 06/28/2019 2.7    •  CV ECHO DAYLIN - PVA(V,* 06/28/2019 2.7    • PA acc slope 06/28/2019 509.0    • PA acc time 06/28/2019 0.11    • RV V1 max PG 06/28/2019 1.1    • RV V1 mean PG 06/28/2019 1.0    • RV V1 max 06/28/2019 53.6    • RV V1 mean 06/28/2019 33.4    • RV V1 VTI 06/28/2019 13.0    • TR max jordan 06/28/2019 242.0    • PA pr(Accel) 06/28/2019 30.0    • Qp/Qs 06/28/2019 0.87    • MVA P1/2T LCG 06/28/2019 2.3    •  CV ECHO DAYLIN - BZI_BMI 06/28/2019 35,989    •  CV ECHO DAYLIN - BSA(HA* 06/28/2019 84.8    •  CV ECHO DAYLIN - BZI_ME* 06/28/2019 92,131    •  CV ECHO DAYLIN - BZI_ME* 06/28/2019 160.0    • Target HR (85%) 06/28/2019 128    • Max. Pred. HR (100%) 06/28/2019 151    • RV Base 06/28/2019 3.00    • Dimensionless Index 06/28/2019 0.6    • Avg E/e' ratio 06/28/2019 11.57    • Ao pk jordan 06/28/2019 133.0    • EF(MOD-bp) 06/28/2019 41.0    • Lat Peak E' Jordan 06/28/2019 7.2    • LV V1 max PG 06/28/2019 2.5    • LV V1 max 06/28/2019 79.0    • Med Peak E' Jordan 06/28/2019 5.30    • Ao max PG 06/28/2019 7.2    • TAPSE (>1.6) 06/28/2019 9.00                 Assessment:          Diagnosis Plan   1. Bradycardia, sinus     2. Presence of cardiac pacemaker            Plan:      1. AV block - s/p pacemaker  2. SSS - s/p pacemaker  3. Pacemaker followup - normal device function, RV threshold is a little high chronically, device reprogrammed to MVPR from DDD to reduce RV pacing (intact AV conduction today) and increase battery longevity    RTC 6 months      Level of  Care:                 Ilir Farfan MD  06/15/20  .

## 2020-06-17 ENCOUNTER — HOSPITAL ENCOUNTER (OUTPATIENT)
Dept: CARDIOLOGY | Facility: HOSPITAL | Age: 70
Discharge: HOME | End: 2020-06-17
Admitting: INTERNAL MEDICINE

## 2020-06-17 VITALS
BODY MASS INDEX: 37.92 KG/M2 | DIASTOLIC BLOOD PRESSURE: 59 MMHG | HEART RATE: 82 BPM | WEIGHT: 214 LBS | HEIGHT: 63 IN | SYSTOLIC BLOOD PRESSURE: 121 MMHG

## 2020-06-17 DIAGNOSIS — R07.89 CHEST PAIN, ATYPICAL: ICD-10-CM

## 2020-06-17 PROCEDURE — 93306 TTE W/DOPPLER COMPLETE: CPT | Performed by: INTERNAL MEDICINE

## 2020-06-17 PROCEDURE — 93306 TTE W/DOPPLER COMPLETE: CPT

## 2020-06-18 ENCOUNTER — TELEPHONE (OUTPATIENT)
Dept: CARDIOLOGY | Facility: CLINIC | Age: 70
End: 2020-06-18

## 2020-06-18 NOTE — TELEPHONE ENCOUNTER
Pt would like to have her echo results along with the following questions answered  1. Any change in heart size or shape?  2. What is EF?  3. Are all parts of heart wall contributing normally?  4. Any evidence of HF, if so what degree?  5. Does is show any evidence of an MI/  6. Any valve abnormalities? Is her MVP worse.  7. Is Aorta size normal?

## 2020-06-20 LAB
AORTIC DIMENSIONLESS INDEX: 0.8 (DI)
ASCENDING AORTA: 2.8 CM
BH CV ECHO MEAS - ACS: 1.8 CM
BH CV ECHO MEAS - AO MAX PG (FULL): 4.3 MMHG
BH CV ECHO MEAS - AO MAX PG: 9.6 MMHG
BH CV ECHO MEAS - AO MEAN PG (FULL): 2 MMHG
BH CV ECHO MEAS - AO MEAN PG: 5 MMHG
BH CV ECHO MEAS - AO ROOT AREA (BSA CORRECTED): 1.5
BH CV ECHO MEAS - AO ROOT AREA: 6.6 CM^2
BH CV ECHO MEAS - AO ROOT DIAM: 2.9 CM
BH CV ECHO MEAS - AO V2 MAX: 155 CM/SEC
BH CV ECHO MEAS - AO V2 MEAN: 96.4 CM/SEC
BH CV ECHO MEAS - AO V2 VTI: 35.8 CM
BH CV ECHO MEAS - ASC AORTA: 2.8 CM
BH CV ECHO MEAS - AVA(I,A): 2.9 CM^2
BH CV ECHO MEAS - AVA(I,D): 2.9 CM^2
BH CV ECHO MEAS - AVA(V,A): 2.8 CM^2
BH CV ECHO MEAS - AVA(V,D): 2.8 CM^2
BH CV ECHO MEAS - BSA(HAYCOCK): 2.1 M^2
BH CV ECHO MEAS - BSA: 2 M^2
BH CV ECHO MEAS - BZI_BMI: 37.9 KILOGRAMS/M^2
BH CV ECHO MEAS - BZI_METRIC_HEIGHT: 160 CM
BH CV ECHO MEAS - BZI_METRIC_WEIGHT: 97.1 KG
BH CV ECHO MEAS - EDV(CUBED): 132.7 ML
BH CV ECHO MEAS - EDV(MOD-SP2): 80 ML
BH CV ECHO MEAS - EDV(MOD-SP4): 80 ML
BH CV ECHO MEAS - EDV(TEICH): 123.8 ML
BH CV ECHO MEAS - EF(CUBED): 64.8 %
BH CV ECHO MEAS - EF(MOD-BP): 61.8 %
BH CV ECHO MEAS - EF(MOD-SP2): 60 %
BH CV ECHO MEAS - EF(MOD-SP4): 63.8 %
BH CV ECHO MEAS - EF(TEICH): 56 %
BH CV ECHO MEAS - ESV(CUBED): 46.7 ML
BH CV ECHO MEAS - ESV(MOD-SP2): 32 ML
BH CV ECHO MEAS - ESV(MOD-SP4): 29 ML
BH CV ECHO MEAS - ESV(TEICH): 54.4 ML
BH CV ECHO MEAS - FS: 29.4 %
BH CV ECHO MEAS - IVS/LVPW: 0.93
BH CV ECHO MEAS - IVSD: 1.3 CM
BH CV ECHO MEAS - LAT PEAK E' VEL: 8.9 CM/SEC
BH CV ECHO MEAS - LV DIASTOLIC VOL/BSA (35-75): 40.2 ML/M^2
BH CV ECHO MEAS - LV MASS(C)D: 285.1 GRAMS
BH CV ECHO MEAS - LV MASS(C)DI: 143.2 GRAMS/M^2
BH CV ECHO MEAS - LV MAX PG: 5.3 MMHG
BH CV ECHO MEAS - LV MEAN PG: 3 MMHG
BH CV ECHO MEAS - LV SYSTOLIC VOL/BSA (12-30): 14.6 ML/M^2
BH CV ECHO MEAS - LV V1 MAX: 115 CM/SEC
BH CV ECHO MEAS - LV V1 MEAN: 76.6 CM/SEC
BH CV ECHO MEAS - LV V1 VTI: 27.2 CM
BH CV ECHO MEAS - LVIDD: 5.1 CM
BH CV ECHO MEAS - LVIDS: 3.6 CM
BH CV ECHO MEAS - LVLD AP2: 8.2 CM
BH CV ECHO MEAS - LVLD AP4: 8.3 CM
BH CV ECHO MEAS - LVLS AP2: 7.4 CM
BH CV ECHO MEAS - LVLS AP4: 7.5 CM
BH CV ECHO MEAS - LVOT AREA (M): 3.8 CM^2
BH CV ECHO MEAS - LVOT AREA: 3.8 CM^2
BH CV ECHO MEAS - LVOT DIAM: 2.2 CM
BH CV ECHO MEAS - LVPWD: 1.4 CM
BH CV ECHO MEAS - MED PEAK E' VEL: 7.3 CM/SEC
BH CV ECHO MEAS - MV A DUR: 0.16 SEC
BH CV ECHO MEAS - MV A MAX VEL: 109 CM/SEC
BH CV ECHO MEAS - MV DEC SLOPE: 323 CM/SEC^2
BH CV ECHO MEAS - MV DEC TIME: 218 SEC
BH CV ECHO MEAS - MV E MAX VEL: 69.8 CM/SEC
BH CV ECHO MEAS - MV E/A: 0.64
BH CV ECHO MEAS - MV MAX PG: 7.1 MMHG
BH CV ECHO MEAS - MV MEAN PG: 2 MMHG
BH CV ECHO MEAS - MV P1/2T MAX VEL: 96.4 CM/SEC
BH CV ECHO MEAS - MV P1/2T: 87.4 MSEC
BH CV ECHO MEAS - MV V2 MAX: 133 CM/SEC
BH CV ECHO MEAS - MV V2 MEAN: 67.8 CM/SEC
BH CV ECHO MEAS - MV V2 VTI: 40.5 CM
BH CV ECHO MEAS - MVA P1/2T LCG: 2.3 CM^2
BH CV ECHO MEAS - MVA(P1/2T): 2.5 CM^2
BH CV ECHO MEAS - MVA(VTI): 2.6 CM^2
BH CV ECHO MEAS - PA ACC TIME: 0.14 SEC
BH CV ECHO MEAS - PA MAX PG (FULL): 1.9 MMHG
BH CV ECHO MEAS - PA MAX PG: 3.6 MMHG
BH CV ECHO MEAS - PA PR(ACCEL): 17.4 MMHG
BH CV ECHO MEAS - PA V2 MAX: 95 CM/SEC
BH CV ECHO MEAS - PULM A REVS DUR: 0.17 SEC
BH CV ECHO MEAS - PULM A REVS VEL: 39.6 CM/SEC
BH CV ECHO MEAS - PULM DIAS VEL: 52.4 CM/SEC
BH CV ECHO MEAS - PULM S/D: 1.7
BH CV ECHO MEAS - PULM SYS VEL: 87 CM/SEC
BH CV ECHO MEAS - PVA(V,A): 2.4 CM^2
BH CV ECHO MEAS - PVA(V,D): 2.4 CM^2
BH CV ECHO MEAS - QP/QS: 0.57
BH CV ECHO MEAS - RAP SYSTOLE: 8 MMHG
BH CV ECHO MEAS - RV BASE (<4.1) - OBSOLETE: 3.5 CM
BH CV ECHO MEAS - RV LENGTH (<8.5) - OBSOLETE: 5.2 CM
BH CV ECHO MEAS - RV MAX PG: 1.7 MMHG
BH CV ECHO MEAS - RV MEAN PG: 1 MMHG
BH CV ECHO MEAS - RV V1 MAX: 65 CM/SEC
BH CV ECHO MEAS - RV V1 MEAN: 43.7 CM/SEC
BH CV ECHO MEAS - RV V1 VTI: 17 CM
BH CV ECHO MEAS - RVOT AREA: 3.5 CM^2
BH CV ECHO MEAS - RVOT DIAM: 2.1 CM
BH CV ECHO MEAS - RVSP: 33 MMHG
BH CV ECHO MEAS - SI(AO): 118.8 ML/M^2
BH CV ECHO MEAS - SI(CUBED): 43.2 ML/M^2
BH CV ECHO MEAS - SI(LVOT): 52 ML/M^2
BH CV ECHO MEAS - SI(MOD-SP2): 24.1 ML/M^2
BH CV ECHO MEAS - SI(MOD-SP4): 25.6 ML/M^2
BH CV ECHO MEAS - SI(TEICH): 34.9 ML/M^2
BH CV ECHO MEAS - SV(AO): 236.5 ML
BH CV ECHO MEAS - SV(CUBED): 86 ML
BH CV ECHO MEAS - SV(LVOT): 103.4 ML
BH CV ECHO MEAS - SV(MOD-SP2): 48 ML
BH CV ECHO MEAS - SV(MOD-SP4): 51 ML
BH CV ECHO MEAS - SV(RVOT): 58.9 ML
BH CV ECHO MEAS - SV(TEICH): 69.4 ML
BH CV ECHO MEAS - TAPSE (>1.6): 2.1 CM2
BH CV ECHO MEAS - TR MAX VEL: 249 CM/SEC
BH CV ECHO MEASUREMENTS AVERAGE E/E' RATIO: 8.62
BH CV XLRA - RV BASE: 3.45 CM
BH CV XLRA - RV LENGTH: 5.2 CM
BH CV XLRA - RV MID: 2.45 CM
BH CV XLRA - TDI S': 13.3 CM/SEC
MAXIMAL PREDICTED HEART RATE: 150 BPM
SINUS: 2.55 CM
STJ: 2.22 CM
STRESS TARGET HR: 128 BPM

## 2020-06-23 NOTE — TELEPHONE ENCOUNTER
Dr Malu Patten    Mrs Tafyoa hasn't received a return call regarding her test results and questions. Requesting a return call.         [phone]

## 2020-08-18 RX ORDER — LOSARTAN POTASSIUM 25 MG/1
TABLET ORAL
Qty: 90 TABLET | Refills: 1 | Status: SHIPPED | OUTPATIENT
Start: 2020-08-18 | End: 2021-01-07

## 2020-10-28 DIAGNOSIS — I10 ESSENTIAL HYPERTENSION: Primary | ICD-10-CM

## 2020-10-29 ENCOUNTER — OFFICE VISIT (OUTPATIENT)
Dept: ORTHOPEDIC SURGERY | Facility: CLINIC | Age: 70
End: 2020-10-29

## 2020-10-29 VITALS — TEMPERATURE: 97.8 F | HEIGHT: 63 IN | BODY MASS INDEX: 37.74 KG/M2 | WEIGHT: 213 LBS

## 2020-10-29 DIAGNOSIS — Z96.651 HISTORY OF TOTAL KNEE ARTHROPLASTY, RIGHT: Primary | ICD-10-CM

## 2020-10-29 DIAGNOSIS — M25.562 LEFT KNEE PAIN, UNSPECIFIED CHRONICITY: ICD-10-CM

## 2020-10-29 PROCEDURE — 73562 X-RAY EXAM OF KNEE 3: CPT | Performed by: ORTHOPAEDIC SURGERY

## 2020-10-29 PROCEDURE — 99212 OFFICE O/P EST SF 10 MIN: CPT | Performed by: ORTHOPAEDIC SURGERY

## 2020-10-29 RX ORDER — METOPROLOL SUCCINATE 25 MG/1
TABLET, EXTENDED RELEASE ORAL
Qty: 45 TABLET | Refills: 1 | Status: SHIPPED | OUTPATIENT
Start: 2020-10-29 | End: 2021-03-17

## 2020-10-29 RX ORDER — HYDROCHLOROTHIAZIDE 25 MG/1
TABLET ORAL
Qty: 90 TABLET | Refills: 1 | Status: SHIPPED | OUTPATIENT
Start: 2020-10-29 | End: 2021-03-17

## 2020-10-29 NOTE — PROGRESS NOTES
Patient: Chanell Tafoya  YOB: 1950 70 y.o. female  Medical Record Number: [id number]    Chief Complaints:   Chief Complaint   Patient presents with   • Right Knee - Follow-up       History of Present Illness:Chanell Tafoya is a 70 y.o. female who presents for follow-up of  Fell with anterior blow to both knees. Moderate ache anteriorly with ambulation.  She has a right total knee replacement.  She has known moderate arthritis of the left knee.    Allergies:   Allergies   Allergen Reactions   • Amitriptyline Shortness Of Breath     Anaphylactic episode   • Asa [Aspirin] Other (See Comments)     Severe abdominal pain   • Azithromycin Other (See Comments)   • Clindamycin/Lincomycin Other (See Comments)     Blisters and erosions in esophagus & stomach   • Codeine Other (See Comments)     In large amounts causes headaches   • Dilaudid [Hydromorphone]      Causes chest pains/breathing problems when given by IV   • Fluconazole Other (See Comments)   • Gabapentin Diarrhea   • Ibuprofen Swelling     Swelling of hands and legs   • Lubiprostone Other (See Comments)   • Naproxen      Blisters in mouth/stomach   • Other      Surgical tape causes blisters     • Talwin [Pentazocine] Itching       Medications:   Current Outpatient Medications   Medication Sig Dispense Refill   • B Complex Vitamins (VITAMIN B COMPLEX) capsule capsule Take  by mouth Daily.     • cholecalciferol (VITAMIN D3) 1000 UNITS tablet Take 1,000 Units by mouth Daily.     • hydroCHLOROthiazide (HYDRODIURIL) 25 MG tablet Take 1 tablet by mouth Daily. 90 tablet 1   • losartan (COZAAR) 25 MG tablet TAKE 1 TABLET EVERY DAY 90 tablet 1   • metoprolol succinate XL (TOPROL-XL) 25 MG 24 hr tablet Take 0.5 tablets by mouth Daily. 45 tablet 1   • omeprazole (prilOSEC) 10 MG capsule Take 1 capsule by mouth 2 (Two) Times a Day.     • Probiotic Product (PROBIOTIC ADVANCED PO) Take  by mouth.       No current facility-administered medications for this  "visit.      Facility-Administered Medications Ordered in Other Visits   Medication Dose Route Frequency Provider Last Rate Last Dose   • mupirocin (BACTROBAN) 2 % nasal ointment   Nasal BID Mathew Sloan MD             The following portions of the patient's history were reviewed and updated as appropriate: allergies, current medications, past family history, past medical history, past social history, past surgical history and problem list.    Review of Systems:   A 14 point review of systems was performed. All systems negative except pertinent positives/negative listed in HPI above    Physical Exam:   Vitals:    10/29/20 1212   Temp: 97.8 °F (36.6 °C)   TempSrc: Temporal   Weight: 96.6 kg (213 lb)   Height: 160 cm (63\")   PainSc: 0-No pain   PainLoc: Knee       General: A and O x 3, ASA, NAD    SCLERA:    Normal    DENTITION:   Normal  Knee:  right    ALIGNMENT:     Neutral  ,   Patella  tracks  Midline without crepitance    GAIT:    Nonantalgic    SKIN:    Well healed midline incision, no erythema or fluctuance    RANGE OF MOTION:   0  -  125   DEG    STRENGTH:   5  / 5    LIGAMENTS:    No varus / valgus instability.   No  Flexion   instability.       DISTAL PULSES:    Paplable    DISTAL SENSATION :   Intact    LYMPHATICS:     No   lymphadenopathy    OTHER:     No   Effusion      Calf soft / nontender ,   Negative Gladis's sign            Radiology:  Xrays 3views RIGHT KNEE (ap,lateral, sunrise) were ordered and reviewed for evaluation of knee pain demonstratingadvanced varus osteoarthritis with bone on bone articulation, subchondral cysts, and periarticular osteophytes and a well positioned knee replacement without evidence of wear loosening or osteolysis.  In comparison to previous films there are no changes      Assessment/Plan:  Yang knee contusions. Stable right TKA, left knee OA-  Contuinue quad exercises. RTO PRN  "

## 2020-11-06 ENCOUNTER — TELEPHONE (OUTPATIENT)
Dept: INTERNAL MEDICINE | Facility: CLINIC | Age: 70
End: 2020-11-06

## 2020-11-06 NOTE — TELEPHONE ENCOUNTER
PT HAS BEEN EXPERIENCING FEVER, COUGH, CONGESTION, AND CHILLS.  PT WAS TESTED FOR COVID THIS WEEK AND WAS CALLED THIS MORNING THIS FROM URGENT CARE AND TOLD THAT HER TEST WAS POSITIVE.  PT WAS TOLD TO CONTACT HER PCP TO INFORM HER OF THIS.    PT CALL BACK  [phone]

## 2020-12-09 ENCOUNTER — OFFICE VISIT (OUTPATIENT)
Dept: CARDIOLOGY | Facility: CLINIC | Age: 70
End: 2020-12-09

## 2020-12-09 VITALS
DIASTOLIC BLOOD PRESSURE: 88 MMHG | SYSTOLIC BLOOD PRESSURE: 138 MMHG | BODY MASS INDEX: 38.27 KG/M2 | WEIGHT: 216 LBS | HEIGHT: 63 IN | HEART RATE: 74 BPM

## 2020-12-09 DIAGNOSIS — I51.89 DIASTOLIC DYSFUNCTION: ICD-10-CM

## 2020-12-09 DIAGNOSIS — E78.5 DYSLIPIDEMIA: ICD-10-CM

## 2020-12-09 DIAGNOSIS — I45.10 RBBB: ICD-10-CM

## 2020-12-09 DIAGNOSIS — I10 ESSENTIAL HYPERTENSION: Primary | ICD-10-CM

## 2020-12-09 DIAGNOSIS — Z95.0 PRESENCE OF CARDIAC PACEMAKER: ICD-10-CM

## 2020-12-09 PROCEDURE — 99214 OFFICE O/P EST MOD 30 MIN: CPT | Performed by: INTERNAL MEDICINE

## 2020-12-09 PROCEDURE — 93291 INTERROG DEV EVAL SCRMS IP: CPT | Performed by: INTERNAL MEDICINE

## 2020-12-09 NOTE — PROGRESS NOTES
Subjective:     Encounter Date:12/09/2020      Patient ID: Chanell Tafoya is a 70 y.o. female.    Chief Complaint:  Chief Complaint   Patient presents with   • Coronary Artery Disease       HPI:  History of Present Illness    She is a  pleasant 70-year-old female with a history of essential hypertension, symptomatic bradycardia, permanent pacemaker and dyslipidemia.  She has sleep apnea and uses CPAP.  She has GERD.  She has a right bundle branch block.  She has nonobstructive coronary disease.  Ms. Tafoya presents to the office with 2 pages of complaints and updated history.  Most of her complaints are noncardiac in etiology.  She did suffer from COVID-19 in late October 2020.  She did not require hospitalization but did have some difficulty with breathing and apparently had some hypoxia which was treated with oxygen.  She says that since her experience with codeine she has had a rash on her lower extremities bilaterally in the evening.  I do not see a rash on exam today.  She also states that she fell in October and injured her right hip and right knee in the form of bruising.  She did not have a syncopal episode with dizziness which prompted the fall.  She states that she had an episode in November 2020 where she had difficulty breathing.  She did take her blood pressure and heart rate.  She states that her heart rate was in the low 50s and low 40s.  She does have a history of PVCs and I have reassured her that her heart rate will not drop significantly below 60 and that the perceived heart rate of 44 was most likely representative of PVCs.  She does have periods where her blood pressure will drop, primarily in the afternoons.  She has not had any episodes of syncope in association with this.  She also describes one episode where she had a sudden fatigue in the afternoon while standing and that her head felt heavy.  She did not take her blood pressure right away but the episode passed in approximately 15  seconds.  Interrogation of the pacemaker today reveals normal function was 6.1 years remaining on her battery life.  She has had her flu shot.  She does have intermittent palpitations but none that cause dizziness or near syncope.  No lower extremity edema, orthopnea or paroxysmal nocturnal dyspnea    The following portions of the patient's history were reviewed and updated as appropriate: allergies, current medications, past family history, past medical history, past social history, past surgical history and problem list.    Problem List:  Patient Active Problem List   Diagnosis   • Primary osteoarthritis of right knee   • Chest pain, atypical   • Constipation   • Difficulty walking   • Presence of cardiac pacemaker   • RBBB   • Dyslipidemia   • GERD (gastroesophageal reflux disease)   • Bradycardia, sinus   • Obstructive sleep apnea on CPAP   • Essential hypertension   • Diastolic dysfunction       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   • GERD (gastroesophageal reflux disease)    • Heart murmur    • History of cellulitis    • History of kidney stones    • History of staph infection     IN FOOT AFTER SURGERY   • Hyperlipidemia    • Hypertension    • Nonocclusive coronary atherosclerosis of native coronary artery     7/2015: Catheter: Left main normal. Proximal LAD with luminal irregularities; circumflex and right coronary artery normal. EF 55%   • Osteoarthritis    • Sarcoidosis    • Sleep apnea     cpap       Past Surgical History:  Past Surgical History:   Procedure Laterality Date   • APPENDECTOMY  1981   • CARDIAC CATHETERIZATION      7/2015: Catheter: Left main normal. Proximal LAD with luminal irregularities; circumflex and right coronary artery normal. EF 55%   • CHOLECYSTECTOMY  1998   • COLONOSCOPY     • ENDOSCOPY     • ESOPHAGEAL DILATATION  2015   • FOOT SURGERY Right 2012    3 screws   • HAND SURGERY Left 1993    thumb  reconstruction    • HAND SURGERY Right 2007    thumb reconstruction   • HYSTERECTOMY  1981   • INSERT / REPLACE / REMOVE PACEMAKER     • KIDNEY STONE SURGERY  2006   • KNEE SURGERY Right 2006    torn meniscus    • LUNG SURGERY Right 2001    lymph node biopsied (sarcoidosis)   • PACEMAKER IMPLANTATION  04/23/2018    [employer]   • TONSILLECTOMY AND ADENOIDECTOMY  1955   • TOTAL KNEE ARTHROPLASTY Right 10/1/2018    Procedure: TOTAL KNEE ARTHROPLASTY;  Surgeon: Mathew Sloan MD;  Location: [facility] OR;  Service: Orthopedics       Social History:  Social History     Socioeconomic History   • Marital status:      Spouse name: Not on file   • Number of children: Not on file   • Years of education: Not on file   • Highest education level: Not on file   Tobacco Use   • Smoking status: Never Smoker   • Smokeless tobacco: Never Used   Substance and Sexual Activity   • Alcohol use: No   • Drug use: No   • Sexual activity: Defer       Allergies:  Allergies   Allergen Reactions   • Amitriptyline Shortness Of Breath     Anaphylactic episode   • Asa [Aspirin] Other (See Comments)     Severe abdominal pain   • Azithromycin Other (See Comments)   • Clindamycin/Lincomycin Other (See Comments)     Blisters and erosions in esophagus & stomach   • Codeine Other (See Comments)     In large amounts causes headaches   • Dilaudid [Hydromorphone]      Causes chest pains/breathing problems when given by IV   • Fluconazole Other (See Comments)   • Gabapentin Diarrhea   • Ibuprofen Swelling     Swelling of hands and legs   • Lubiprostone Other (See Comments)   • Naproxen      Blisters in mouth/stomach   • Other      Surgical tape causes blisters     • Talwin [Pentazocine] Itching       Immunizations:    There is no immunization history on file for this patient.    ROS:  Review of Systems   Constitution: Positive for malaise/fatigue. Negative for chills, decreased appetite, fever, weight gain and weight loss.   HENT: Negative for  "congestion, hoarse voice, nosebleeds and sore throat.    Eyes: Negative for blurred vision, double vision and visual disturbance.   Cardiovascular: Negative for chest pain, claudication, dyspnea on exertion, irregular heartbeat, leg swelling, near-syncope, orthopnea, palpitations, paroxysmal nocturnal dyspnea and syncope.   Respiratory: Negative for cough, hemoptysis, shortness of breath, sleep disturbances due to breathing, snoring, sputum production and wheezing.    Endocrine: Negative for cold intolerance, heat intolerance, polydipsia and polyuria.   Hematologic/Lymphatic: Negative for adenopathy and bleeding problem. Does not bruise/bleed easily.   Skin: Positive for rash. Negative for flushing, itching and nail changes.   Musculoskeletal: Negative for arthritis, back pain, joint pain, muscle cramps, muscle weakness, myalgias and neck pain.   Gastrointestinal: Negative for bloating, abdominal pain, anorexia, change in bowel habit, constipation, diarrhea, heartburn, hematemesis, hematochezia, jaundice, melena, nausea and vomiting.   Genitourinary: Negative for dysuria, hematuria and nocturia.   Neurological: Positive for light-headedness. Negative for brief paralysis, disturbances in coordination, excessive daytime sleepiness, dizziness, headaches, loss of balance, numbness, paresthesias, seizures and vertigo.   Psychiatric/Behavioral: Negative for altered mental status and depression. The patient is not nervous/anxious.    Allergic/Immunologic: Negative for environmental allergies and hives.          Objective:         /88   Pulse 74   Ht 160 cm (63\")   Wt 98 kg (216 lb)   BMI 38.26 kg/m²     Constitutional:       Appearance: Healthy appearance. Not in distress.   Neck:      Musculoskeletal: Normal range of motion and neck supple.      Vascular: No JVR. JVD normal.   Pulmonary:      Effort: Pulmonary effort is normal.      Breath sounds: Normal breath sounds. No wheezing. No rhonchi. No rales.   Chest: "      Chest wall: Not tender to palpatation.      Comments: Healed pacemaker scar  Cardiovascular:      PMI at left midclavicular line. Normal rate. Regular rhythm. Normal S1. Normal S2.      Murmurs: There is a systolic murmur.  There is a 1/6 systolic murmur at the left lower sternal border.      No gallop. No click. No rub.   Pulses:     Intact distal pulses.   Edema:     Peripheral edema absent.   Abdominal:      General: Bowel sounds are normal.      Palpations: Abdomen is soft. There is no hepatomegaly.      Tenderness: There is no abdominal tenderness.   Musculoskeletal: Normal range of motion.         General: No tenderness.   Skin:     General: Skin is warm and dry.   Neurological:      General: No focal deficit present.      Mental Status: Alert and oriented to person, place and time.         In-Office Procedure(s):  Procedures    ASCVD RIsk Score::  The 10-year ASCVD risk score (Romy LINDSEY Jr., et al., 2013) is: 16.1%    Values used to calculate the score:      Age: 70 years      Sex: Female      Is Non- : No      Diabetic: No      Tobacco smoker: No      Systolic Blood Pressure: 138 mmHg      Is BP treated: Yes      HDL Cholesterol: 59 mg/dL      Total Cholesterol: 212 mg/dL    Recent Radiology:  Imaging Results (Most Recent)     None          Lab Review:   not applicable               Assessment:          Diagnosis Plan   1. Essential hypertension     2. Diastolic dysfunction     3. Dyslipidemia     4. Presence of cardiac pacemaker     5. RBBB            Plan:   Overall, Ms. Tafoya is doing well from a cardiac perspective.  Her blood pressure is mildly elevated in the office today but she states that it is normally home.  She does appear to have some episodes where her blood pressure drops but not precipitously.  I have not changed her medications.  She is on low-dose antihypertensive medication.    Interrogation of her pacemaker reveals normal function.        Level of  Care:                 Malu Patten MD  12/09/20  .

## 2020-12-17 ENCOUNTER — OFFICE VISIT (OUTPATIENT)
Dept: CARDIOLOGY | Facility: CLINIC | Age: 70
End: 2020-12-17

## 2020-12-17 DIAGNOSIS — Z95.0 PRESENCE OF CARDIAC PACEMAKER: ICD-10-CM

## 2020-12-17 DIAGNOSIS — R00.1 BRADYCARDIA, SINUS: Primary | ICD-10-CM

## 2020-12-17 PROCEDURE — 93288 INTERROG EVL PM/LDLS PM IP: CPT | Performed by: INTERNAL MEDICINE

## 2020-12-17 PROCEDURE — 99212 OFFICE O/P EST SF 10 MIN: CPT | Performed by: INTERNAL MEDICINE

## 2020-12-17 NOTE — PROGRESS NOTES
Subjective:     Encounter Date:12/17/2020      Patient ID: Chanell Tafoya is a 70 y.o. female.    Chief Complaint:  Pacemaker followup    HPI:  Patient presents for pacemaker followup.  Ms Tafoya is a 71 yo patient of Dr. Malu Patten with a past medical history significant for HTN, HLD, CORINA on CPAP who had a pacemaker implanted in 2018 for AV block.      She recently had COVID and has noted some increased palpitations since then.  She has been noted to have some PVC's.  Today, she denies any chest pain or dyspnea.      The following portions of the patient's history were reviewed and updated as appropriate: allergies, current medications, past family history, past medical history, past social history, past surgical history and problem list.    Problem List:  Patient Active Problem List   Diagnosis   • Primary osteoarthritis of right knee   • Chest pain, atypical   • Constipation   • Difficulty walking   • Presence of cardiac pacemaker   • RBBB   • Dyslipidemia   • GERD (gastroesophageal reflux disease)   • Bradycardia, sinus   • Obstructive sleep apnea on CPAP   • Essential hypertension   • Diastolic dysfunction       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   • GERD (gastroesophageal reflux disease)    • Heart murmur    • History of cellulitis    • History of kidney stones    • History of staph infection     IN FOOT AFTER SURGERY   • Hyperlipidemia    • Hypertension    • Nonocclusive coronary atherosclerosis of native coronary artery     7/2015: Catheter: Left main normal. Proximal LAD with luminal irregularities; circumflex and right coronary artery normal. EF 55%   • Osteoarthritis    • Sarcoidosis    • Sleep apnea     cpap       Past Surgical History:  Past Surgical History:   Procedure Laterality Date   • APPENDECTOMY  1981   • CARDIAC CATHETERIZATION      7/2015: Catheter: Left main normal. Proximal LAD with luminal  irregularities; circumflex and right coronary artery normal. EF 55%   • CHOLECYSTECTOMY  1998   • COLONOSCOPY     • ENDOSCOPY     • ESOPHAGEAL DILATATION  2015   • FOOT SURGERY Right 2012    3 screws   • HAND SURGERY Left 1993    thumb reconstruction    • HAND SURGERY Right 2007    thumb reconstruction   • HYSTERECTOMY  1981   • INSERT / REPLACE / REMOVE PACEMAKER     • KIDNEY STONE SURGERY  2006   • KNEE SURGERY Right 2006    torn meniscus    • LUNG SURGERY Right 2001    lymph node biopsied (sarcoidosis)   • PACEMAKER IMPLANTATION  04/23/2018    [employer]   • TONSILLECTOMY AND ADENOIDECTOMY  1955   • TOTAL KNEE ARTHROPLASTY Right 10/1/2018    Procedure: TOTAL KNEE ARTHROPLASTY;  Surgeon: Mathew Sloan MD;  Location: [facility] MA[facility] OR;  Service: Orthopedics       Social History:  Social History     Socioeconomic History   • Marital status:      Spouse name: Not on file   • Number of children: Not on file   • Years of education: Not on file   • Highest education level: Not on file   Tobacco Use   • Smoking status: Never Smoker   • Smokeless tobacco: Never Used   Substance and Sexual Activity   • Alcohol use: No   • Drug use: No   • Sexual activity: Defer       Allergies:  Allergies   Allergen Reactions   • Amitriptyline Shortness Of Breath     Anaphylactic episode   • Asa [Aspirin] Other (See Comments)     Severe abdominal pain   • Azithromycin Other (See Comments)   • Clindamycin/Lincomycin Other (See Comments)     Blisters and erosions in esophagus & stomach   • Codeine Other (See Comments)     In large amounts causes headaches   • Dilaudid [Hydromorphone]      Causes chest pains/breathing problems when given by IV   • Fluconazole Other (See Comments)   • Gabapentin Diarrhea   • Ibuprofen Swelling     Swelling of hands and legs   • Lubiprostone Other (See Comments)   • Naproxen      Blisters in mouth/stomach   • Other      Surgical tape causes blisters     • Talwin [Pentazocine] Itching        Immunizations:    There is no immunization history on file for this patient.    ROS:  Review of Systems   Constitution: Positive for malaise/fatigue.   Cardiovascular: Positive for irregular heartbeat and palpitations. Negative for chest pain, leg swelling, near-syncope, orthopnea, paroxysmal nocturnal dyspnea and syncope.   Respiratory: Negative for shortness of breath.    All other systems reviewed and are negative.         Objective:         There were no vitals taken for this visit.    Constitutional:       General: Not in acute distress.     Appearance: Well-developed.   Eyes:      General: No scleral icterus.     Conjunctiva/sclera: Conjunctivae normal.      Pupils: Pupils are equal, round, and reactive to light.   HENT:      Head: Normocephalic and atraumatic.   Neck:      Musculoskeletal: Normal range of motion.      Thyroid: No thyromegaly.   Pulmonary:      Effort: Pulmonary effort is normal.      Breath sounds: Normal breath sounds.   Cardiovascular:      Normal rate. Regular rhythm.   Abdominal:      General: Bowel sounds are normal.      Palpations: Abdomen is soft.   Musculoskeletal: Normal range of motion.   Skin:     General: Skin is warm and dry.   Neurological:      Mental Status: Alert and oriented to person, place, and time.         In-Office Procedure(s):  Procedures  Pacemaker eval interpreted by me  MDT [id number]  Battery JESÚS    P wave 1.9mv  Threshold 0.6V  AImpedance 437 ohms    R wave 1.4mv  Threshold 1.5V  Impedance 323 ohms    Events  none    ASCVD RIsk Score::  The 10-year ASCVD risk score (Romy LINDSEY Jr., et al., 2013) is: 16.1%    Values used to calculate the score:      Age: 70 years      Sex: Female      Is Non- : No      Diabetic: No      Tobacco smoker: No      Systolic Blood Pressure: 138 mmHg      Is BP treated: Yes      HDL Cholesterol: 59 mg/dL      Total Cholesterol: 212 mg/dL    Recent Radiology:  Imaging Results (Most Recent)     None          Lab  Review:   No visits with results within 2 Month(s) from this visit.   Latest known visit with results is:   Hospital Outpatient Visit on 06/17/2020   Component Date Value   • BSA 06/18/2020 2.0    • IVSd 06/18/2020 1.3    • LVIDd 06/18/2020 5.1    • LVIDs 06/18/2020 3.6    • LVPWd 06/18/2020 1.4    • IVS/LVPW 06/18/2020 0.93    • FS 06/18/2020 29.4    • EDV(Teich) 06/18/2020 123.8    • ESV(Teich) 06/18/2020 54.4    • EF(Teich) 06/18/2020 56.0    • EDV(cubed) 06/18/2020 132.7    • ESV(cubed) 06/18/2020 46.7    • EF(cubed) 06/18/2020 64.8    • LV mass(C)d 06/18/2020 285.1    • LV mass(C)dI 06/18/2020 143.2    • SV(Teich) 06/18/2020 69.4    • SI(Teich) 06/18/2020 34.9    • SV(cubed) 06/18/2020 86.0    • SI(cubed) 06/18/2020 43.2    • Ao root diam 06/18/2020 2.9    • Ao root area 06/18/2020 6.6    • ACS 06/18/2020 1.8    • asc Aorta Diam 06/18/2020 2.8    • LVOT diam 06/18/2020 2.2    • LVOT area 06/18/2020 3.8    • LVOT area(traced) 06/18/2020 3.8    • RVOT diam 06/18/2020 2.1    • RVOT area 06/18/2020 3.5    • LVLd ap4 06/18/2020 8.3    • EDV(MOD-sp4) 06/18/2020 80.0    • LVLs ap4 06/18/2020 7.5    • ESV(MOD-sp4) 06/18/2020 29.0    • EF(MOD-sp4) 06/18/2020 63.8    • LVLd ap2 06/18/2020 8.2    • EDV(MOD-sp2) 06/18/2020 80.0    • LVLs ap2 06/18/2020 7.4    • ESV(MOD-sp2) 06/18/2020 32.0    • EF(MOD-sp2) 06/18/2020 60.0    • SV(MOD-sp4) 06/18/2020 51.0    • SI(MOD-sp4) 06/18/2020 25.6    • SV(MOD-sp2) 06/18/2020 48.0    • SI(MOD-sp2) 06/18/2020 24.1    • Ao root area (BSA correc* 06/18/2020 1.5    • LV Johnson Vol (BSA correct* 06/18/2020 40.2    • LV Sys Vol (BSA correcte* 06/18/2020 14.6    • EF(MOD-bp) 06/18/2020 61.8    • MV A dur 06/18/2020 0.16    • MV E max ellen 06/18/2020 69.8    • MV A max ellen 06/18/2020 109.0    • MV E/A 06/18/2020 0.64    • MV V2 max 06/18/2020 133.0    • MV max PG 06/18/2020 7.1    • MV V2 mean 06/18/2020 67.8    • MV mean PG 06/18/2020 2.0    • MV V2 VTI 06/18/2020 40.5    • MVA(VTI) 06/18/2020  2.6    • MV P1/2t max jordan 06/18/2020 96.4    • MV P1/2t 06/18/2020 87.4    • MVA(P1/2t) 06/18/2020 2.5    • MV dec slope 06/18/2020 323.0    • MV dec time 06/18/2020 218    • Ao pk jordan 06/18/2020 155.0    • Ao max PG 06/18/2020 9.6    • Ao max PG (full) 06/18/2020 4.3    • Ao V2 mean 06/18/2020 96.4    • Ao mean PG 06/18/2020 5.0    • Ao mean PG (full) 06/18/2020 2.0    • Ao V2 VTI 06/18/2020 35.8    • RAMIRO(I,A) 06/18/2020 2.9    • RAMIRO(I,D) 06/18/2020 2.9    • RAMIRO(V,A) 06/18/2020 2.8    • RAMIRO(V,D) 06/18/2020 2.8    • LV V1 max PG 06/18/2020 5.3    • LV V1 mean PG 06/18/2020 3.0    • LV V1 max 06/18/2020 115.0    • LV V1 mean 06/18/2020 76.6    • LV V1 VTI 06/18/2020 27.2    • SV(Ao) 06/18/2020 236.5    • SI(Ao) 06/18/2020 118.8    • SV(LVOT) 06/18/2020 103.4    • SV(RVOT) 06/18/2020 58.9    • SI(LVOT) 06/18/2020 52.0    • PA V2 max 06/18/2020 95.0    • PA max PG 06/18/2020 3.6    • PA max PG (full) 06/18/2020 1.9    • BH CV ECHO DAYLIN - PVA(V,* 06/18/2020 2.4    • BH CV ECHO DAYLIN - PVA(V,* 06/18/2020 2.4    • PA acc time 06/18/2020 0.14    • RV V1 max PG 06/18/2020 1.7    • RV V1 mean PG 06/18/2020 1.0    • RV V1 max 06/18/2020 65.0    • RV V1 mean 06/18/2020 43.7    • RV V1 VTI 06/18/2020 17.0    • TR max jordan 06/18/2020 249.0    • PA pr(Accel) 06/18/2020 17.4    • Pulm Sys Jordan 06/18/2020 87.0    • Pulm Johnson Jordan 06/18/2020 52.4    • Pulm S/D 06/18/2020 1.7    • Qp/Qs 06/18/2020 0.57    • Pulm A Revs Dur 06/18/2020 0.17    • Pulm A Revs Jordan 06/18/2020 39.6    • MVA P1/2T LCG 06/18/2020 2.3    • RV BASE (<4.1) - obsolete 06/18/2020 3.5    • RV LENGTH (<8.5) - obsol* 06/18/2020 5.2    • RV Mid 06/18/2020 2.45    • Lat Peak E' Jordan 06/18/2020 8.9    • Med Peak E' Jordan 06/18/2020 7.3    •  CV ECHO DAYLIN - BZI_BMI 06/18/2020 37.9    •  CV ECHO DAYLIN - BSA(HA* 06/18/2020 2.1    •  CV ECHO DAYLIN - BZI_ME* 06/18/2020 97.1    •  CV ECHO DAYLIN - BZI_ME* 06/18/2020 160.0    • Avg E/e' ratio 06/18/2020 8.62    • RV S' 06/18/2020  13.30    • RV Base 06/18/2020 3.45    • RV Length 06/18/2020 5.20    • Sinus 06/18/2020 2.55    • STJ 06/18/2020 2.22    • Ascending aorta 06/18/2020 2.80    • Dimensionless Index 06/18/2020 0.8    • RAP systole 06/18/2020 8    • RVSP(TR) 06/18/2020 33    • TAPSE (>1.6) 06/18/2020 2.10    • Target HR (85%) 06/18/2020 128    • Max. Pred. HR (100%) 06/18/2020 150                 Assessment:          Diagnosis Plan   1. Bradycardia, sinus     2. Presence of cardiac pacemaker            Plan:      1. AV block - s/p pacemaker  2. Pacemaker followup - normal device function  3. PVC's - continue metoprolol    RTC 1 year      Level of Care:                 Ilir Farfan MD  12/17/20  .

## 2020-12-28 VITALS
BODY MASS INDEX: 38.62 KG/M2 | WEIGHT: 218 LBS | HEIGHT: 63 IN | SYSTOLIC BLOOD PRESSURE: 148 MMHG | HEART RATE: 76 BPM | DIASTOLIC BLOOD PRESSURE: 72 MMHG

## 2021-01-07 DIAGNOSIS — I10 ESSENTIAL HYPERTENSION: Primary | ICD-10-CM

## 2021-01-08 RX ORDER — LOSARTAN POTASSIUM 25 MG/1
TABLET ORAL
Qty: 90 TABLET | Refills: 1 | Status: SHIPPED | OUTPATIENT
Start: 2021-01-08 | End: 2021-05-25

## 2021-01-14 ENCOUNTER — TELEPHONE (OUTPATIENT)
Dept: CARDIOLOGY | Facility: CLINIC | Age: 71
End: 2021-01-14

## 2021-01-14 NOTE — TELEPHONE ENCOUNTER
CPA  PT CALLED REPORTING SHE HAD LABS DRAWN AT [facility] ON 1/08/21 AND WANTS DR GOLDBERG TO REVIEW ORDERED BY Lisbet Quintana [phone]     PT CB# [phone]

## 2021-02-01 ENCOUNTER — TELEPHONE (OUTPATIENT)
Dept: CARDIOLOGY | Facility: CLINIC | Age: 71
End: 2021-02-01

## 2021-02-01 NOTE — TELEPHONE ENCOUNTER
This is certainly not my area of expertise some of her primary care physician recommends B12 shots I would agree

## 2021-02-01 NOTE — TELEPHONE ENCOUNTER
Patient called today stating she had some lab work recently with  which revealed b12 deficiency per pt. She would like dr burks opinion on her receiving the b12 shot. Could you review with her and call patient please? Thanks

## 2021-03-16 DIAGNOSIS — I10 ESSENTIAL HYPERTENSION: ICD-10-CM

## 2021-03-17 RX ORDER — METOPROLOL SUCCINATE 25 MG/1
TABLET, EXTENDED RELEASE ORAL
Qty: 45 TABLET | Refills: 1 | Status: SHIPPED | OUTPATIENT
Start: 2021-03-17 | End: 2021-08-02

## 2021-03-17 RX ORDER — HYDROCHLOROTHIAZIDE 25 MG/1
TABLET ORAL
Qty: 90 TABLET | Refills: 1 | Status: SHIPPED | OUTPATIENT
Start: 2021-03-17 | End: 2021-08-02

## 2021-04-21 ENCOUNTER — TELEPHONE (OUTPATIENT)
Dept: CARDIOLOGY | Facility: CLINIC | Age: 71
End: 2021-04-21

## 2021-04-21 NOTE — TELEPHONE ENCOUNTER
Spoke with the patient and her lowest bp was 106/63, this is not too low, however until the diarrhea has stopped hold the Losartan but continue the Metoprolol, also keep hydrated.

## 2021-04-21 NOTE — TELEPHONE ENCOUNTER
21-Chanell Michelet  : 1950  Number:   Reason for Call  Please call pt, she is having some blood pressure issues. Her blood pressures are running low but she also has been sick with vomiting and diarrhea.

## 2021-05-24 DIAGNOSIS — I10 ESSENTIAL HYPERTENSION: ICD-10-CM

## 2021-05-25 RX ORDER — LOSARTAN POTASSIUM 25 MG/1
TABLET ORAL
Qty: 90 TABLET | Refills: 1 | Status: SHIPPED | OUTPATIENT
Start: 2021-05-25 | End: 2021-06-01

## 2021-05-31 PROBLEM — Z99.89 OBSTRUCTIVE SLEEP APNEA ON CPAP: Chronic | Status: ACTIVE | Noted: 2019-06-20

## 2021-05-31 PROBLEM — I10 ESSENTIAL HYPERTENSION: Chronic | Status: ACTIVE | Noted: 2019-12-19

## 2021-05-31 PROBLEM — I45.10 RBBB: Chronic | Status: ACTIVE | Noted: 2019-06-20

## 2021-05-31 PROBLEM — I51.89 DIASTOLIC DYSFUNCTION: Chronic | Status: ACTIVE | Noted: 2019-12-19

## 2021-05-31 PROBLEM — G47.33 OBSTRUCTIVE SLEEP APNEA ON CPAP: Chronic | Status: ACTIVE | Noted: 2019-06-20

## 2021-05-31 PROBLEM — Z98.890 HISTORY OF CARDIAC CATHETERIZATION: Status: ACTIVE | Noted: 2021-05-31

## 2021-05-31 PROBLEM — E78.5 DYSLIPIDEMIA: Chronic | Status: ACTIVE | Noted: 2019-06-20

## 2021-05-31 PROBLEM — K21.9 GERD (GASTROESOPHAGEAL REFLUX DISEASE): Chronic | Status: ACTIVE | Noted: 2019-06-20

## 2021-05-31 PROBLEM — Z95.0 PRESENCE OF CARDIAC PACEMAKER: Chronic | Status: ACTIVE | Noted: 2019-06-20

## 2021-06-01 ENCOUNTER — OFFICE VISIT (OUTPATIENT)
Dept: CARDIOLOGY | Facility: CLINIC | Age: 71
End: 2021-06-01

## 2021-06-01 VITALS
HEIGHT: 63 IN | BODY MASS INDEX: 38.62 KG/M2 | WEIGHT: 218 LBS | SYSTOLIC BLOOD PRESSURE: 124 MMHG | DIASTOLIC BLOOD PRESSURE: 72 MMHG | HEART RATE: 70 BPM

## 2021-06-01 DIAGNOSIS — I10 ESSENTIAL HYPERTENSION: Primary | Chronic | ICD-10-CM

## 2021-06-01 DIAGNOSIS — R42 DIZZINESS: Chronic | ICD-10-CM

## 2021-06-01 DIAGNOSIS — Z95.0 PRESENCE OF CARDIAC PACEMAKER: Chronic | ICD-10-CM

## 2021-06-01 DIAGNOSIS — R07.89 CHEST PAIN, ATYPICAL: ICD-10-CM

## 2021-06-01 DIAGNOSIS — I49.3 PVC'S (PREMATURE VENTRICULAR CONTRACTIONS): Chronic | ICD-10-CM

## 2021-06-01 DIAGNOSIS — E78.5 DYSLIPIDEMIA: Chronic | ICD-10-CM

## 2021-06-01 PROCEDURE — 99214 OFFICE O/P EST MOD 30 MIN: CPT | Performed by: INTERNAL MEDICINE

## 2021-06-01 PROCEDURE — 93288 INTERROG EVL PM/LDLS PM IP: CPT | Performed by: INTERNAL MEDICINE

## 2021-06-27 PROCEDURE — 93294 REM INTERROG EVL PM/LDLS PM: CPT | Performed by: INTERNAL MEDICINE

## 2021-06-27 PROCEDURE — 93296 REM INTERROG EVL PM/IDS: CPT | Performed by: INTERNAL MEDICINE

## 2021-07-01 ENCOUNTER — TELEPHONE (OUTPATIENT)
Dept: CARDIOLOGY | Facility: CLINIC | Age: 71
End: 2021-07-01

## 2021-07-01 NOTE — TELEPHONE ENCOUNTER
Patient called in on 07/01 to report that she sent in a transmission of her pacemaker. Patient was told at last appointment to notify the office when she does this.

## 2021-08-02 ENCOUNTER — TELEPHONE (OUTPATIENT)
Dept: CARDIOLOGY | Facility: CLINIC | Age: 71
End: 2021-08-02

## 2021-08-02 DIAGNOSIS — I10 ESSENTIAL HYPERTENSION: ICD-10-CM

## 2021-08-02 RX ORDER — METOPROLOL SUCCINATE 25 MG/1
TABLET, EXTENDED RELEASE ORAL
Qty: 45 TABLET | Refills: 1 | Status: SHIPPED | OUTPATIENT
Start: 2021-08-02 | End: 2021-10-04

## 2021-08-02 RX ORDER — HYDROCHLOROTHIAZIDE 25 MG/1
TABLET ORAL
Qty: 90 TABLET | Refills: 1 | Status: SHIPPED | OUTPATIENT
Start: 2021-08-02 | End: 2021-12-16

## 2021-08-02 NOTE — TELEPHONE ENCOUNTER
Pt called for PVC counts on home monitor. PVC count up from 2.4 per hour in April to 7.0 Per hr for August 2nd. Pt would like to know if she should remain on her current meds?    Please advise   [phone]

## 2021-08-03 NOTE — TELEPHONE ENCOUNTER
Per Dr. Patten, we can not increase the Metoprolol any further due to the low bp, asked if she was symptomatic & she is not. She does not need to check her PM every month if she is asymptomatic.

## 2021-08-03 NOTE — TELEPHONE ENCOUNTER
Pt also wanted you to know her B/P has been low 93/67. Not everyday but she is still concerned about it.

## 2021-09-26 PROCEDURE — 93296 REM INTERROG EVL PM/IDS: CPT | Performed by: INTERNAL MEDICINE

## 2021-09-26 PROCEDURE — 93294 REM INTERROG EVL PM/LDLS PM: CPT | Performed by: INTERNAL MEDICINE

## 2021-10-04 ENCOUNTER — OFFICE VISIT (OUTPATIENT)
Dept: CARDIOLOGY | Facility: CLINIC | Age: 71
End: 2021-10-04

## 2021-10-04 VITALS
DIASTOLIC BLOOD PRESSURE: 82 MMHG | HEIGHT: 63 IN | HEART RATE: 82 BPM | WEIGHT: 212 LBS | SYSTOLIC BLOOD PRESSURE: 128 MMHG | BODY MASS INDEX: 37.56 KG/M2

## 2021-10-04 DIAGNOSIS — Z95.0 PRESENCE OF CARDIAC PACEMAKER: Chronic | ICD-10-CM

## 2021-10-04 DIAGNOSIS — R00.1 BRADYCARDIA, SINUS: Primary | ICD-10-CM

## 2021-10-04 DIAGNOSIS — I51.89 DIASTOLIC DYSFUNCTION: ICD-10-CM

## 2021-10-04 PROCEDURE — 99212 OFFICE O/P EST SF 10 MIN: CPT | Performed by: INTERNAL MEDICINE

## 2021-10-04 RX ORDER — METOPROLOL SUCCINATE 25 MG/1
1 TABLET, EXTENDED RELEASE ORAL DAILY
COMMUNITY
Start: 2021-06-04 | End: 2021-11-16 | Stop reason: SDUPTHER

## 2021-10-04 NOTE — PROGRESS NOTES
Subjective:     Encounter Date:10/04/2021      Patient ID: Chanell Tafoya is a 71 y.o. female.    Chief Complaint:  Chief Complaint   Patient presents with   • Dizziness       HPI:  Patient presents for pacemaker followup.  Ms Tafoya is a 70 yo patient of Dr. Malu Patten with a past medical history significant for HTN, HLD, CORINA on CPAP who had a pacemaker implanted in 2018 for AV block.       Today, her major complaint is that of dizziness and  PVC's.  Her antihypertensive have been reduced and her metoprolol titrated up.      The following portions of the patient's history were reviewed and updated as appropriate: allergies, current medications, past family history, past medical history, past social history, past surgical history and problem list.    Problem List:  Patient Active Problem List   Diagnosis   • Primary osteoarthritis of right knee   • Chest pain, atypical   • Constipation   • Difficulty walking   • Presence of cardiac pacemaker   • RBBB   • Dyslipidemia   • GERD (gastroesophageal reflux disease)   • Bradycardia, sinus   • Obstructive sleep apnea on CPAP   • Essential hypertension   • Diastolic dysfunction   • History of cardiac catheterization   • PVC's (premature ventricular contractions)   • Dizziness       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   • GERD (gastroesophageal reflux disease)    • Heart murmur    • History of cellulitis    • History of kidney stones    • History of staph infection     IN FOOT AFTER SURGERY   • Hyperlipidemia    • Hypertension    • Nonocclusive coronary atherosclerosis of native coronary artery     7/2015: Catheter: Left main normal. Proximal LAD with luminal irregularities; circumflex and right coronary artery normal. EF 55%   • Osteoarthritis    • Sarcoidosis    • Sleep apnea     cpap       Past Surgical History:  Past Surgical History:   Procedure Laterality Date   • APPENDECTOMY  1981    • CARDIAC CATHETERIZATION      7/2015: Catheter: Left main normal. Proximal LAD with luminal irregularities; circumflex and right coronary artery normal. EF 55%   • CHOLECYSTECTOMY  1998   • COLONOSCOPY     • ENDOSCOPY     • ESOPHAGEAL DILATATION  2015   • FOOT SURGERY Right 2012    3 screws   • HAND SURGERY Left 1993    thumb reconstruction    • HAND SURGERY Right 2007    thumb reconstruction   • HYSTERECTOMY  1981   • INSERT / REPLACE / REMOVE PACEMAKER     • KIDNEY STONE SURGERY  2006   • KNEE SURGERY Right 2006    torn meniscus    • LUNG SURGERY Right 2001    lymph node biopsied (sarcoidosis)   • PACEMAKER IMPLANTATION  04/23/2018    [employer]   • TONSILLECTOMY AND ADENOIDECTOMY  1955   • TOTAL KNEE ARTHROPLASTY Right 10/1/2018    Procedure: TOTAL KNEE ARTHROPLASTY;  Surgeon: Mathew Sloan MD;  Location: [facility];  Service: Orthopedics       Social History:  Social History     Socioeconomic History   • Marital status:      Spouse name: Not on file   • Number of children: Not on file   • Years of education: Not on file   • Highest education level: Not on file   Tobacco Use   • Smoking status: Never Smoker   • Smokeless tobacco: Never Used   Substance and Sexual Activity   • Alcohol use: No   • Drug use: No   • Sexual activity: Defer       Allergies:  Allergies   Allergen Reactions   • Amitriptyline Shortness Of Breath     Anaphylactic episode   • Aspirin Other (See Comments) and GI Intolerance     Severe abdominal pain   • Hydromorphone Palpitations     Causes chest pains/breathing problems when given by IV   • Naproxen Rash     Blisters in mouth/stomach  mouth and stomach blisters   • Pentazocine Itching   • Codeine Other (See Comments) and Headache     In large amounts causes headaches   • Azithromycin Other (See Comments)   • Clindamycin/Lincomycin Other (See Comments)     Blisters and erosions in esophagus & stomach   • Fluconazole Other (See Comments)   • Gabapentin Diarrhea   • Ibuprofen  "Swelling and Nausea Only     Swelling of hands and legs   • Lubiprostone Other (See Comments)   • Other      Surgical tape causes blisters         Immunizations:    There is no immunization history on file for this patient.    ROS:  Review of Systems   Constitutional: Negative for malaise/fatigue.   Cardiovascular: Positive for irregular heartbeat. Negative for chest pain, dyspnea on exertion, leg swelling, near-syncope, orthopnea, palpitations, paroxysmal nocturnal dyspnea and syncope.   Respiratory: Negative for shortness of breath.    Neurological: Positive for light-headedness.   All other systems reviewed and are negative.         Objective:         /82   Pulse 82   Ht 160 cm (63\")   Wt 96.2 kg (212 lb)   BMI 37.55 kg/m²     Constitutional:       General: Not in acute distress.     Appearance: Well-developed.   Eyes:      General: No scleral icterus.     Conjunctiva/sclera: Conjunctivae normal.      Pupils: Pupils are equal, round, and reactive to light.   HENT:      Head: Normocephalic and atraumatic.   Neck:      Thyroid: No thyromegaly.   Pulmonary:      Effort: Pulmonary effort is normal.      Breath sounds: Normal breath sounds.   Cardiovascular:      Normal rate. Regular rhythm.   Abdominal:      General: Bowel sounds are normal.      Palpations: Abdomen is soft.   Musculoskeletal: Normal range of motion.      Cervical back: Normal range of motion. Skin:     General: Skin is warm and dry.   Neurological:      Mental Status: Alert and oriented to person, place, and time.         In-Office Procedure(s):  Procedures  Pacemaker eval interpreted by me  MDT [id number]  Battery 5.7 yrs to christiana    P wave 1.9mv  Threshold 0.5V  Impedance 380 ohms    R wave 2mv  Threshold 1.6V  Impedance 342 ohms        ASCVD RIsk Score::  The 10-year ASCVD risk score ([place] ROSALIA Jr., et al., 2013) is: 15.6%    Values used to calculate the score:      Age: 71 years      Sex: Female      Is Non- : No      " Diabetic: No      Tobacco smoker: No      Systolic Blood Pressure: 128 mmHg      Is BP treated: Yes      HDL Cholesterol: 59 mg/dL      Total Cholesterol: 212 mg/dL    Recent Radiology:  Imaging Results (Most Recent)     None          Lab Review:   No visits with results within 2 Month(s) from this visit.   Latest known visit with results is:   Hospital Outpatient Visit on 06/17/2020   Component Date Value   • BSA 06/18/2020 2.0    • IVSd 06/18/2020 1.3    • LVIDd 06/18/2020 5.1    • LVIDs 06/18/2020 3.6    • LVPWd 06/18/2020 1.4    • IVS/LVPW 06/18/2020 0.93    • FS 06/18/2020 29.4    • EDV(Teich) 06/18/2020 123.8    • ESV(Teich) 06/18/2020 54.4    • EF(Teich) 06/18/2020 56.0    • EDV(cubed) 06/18/2020 132.7    • ESV(cubed) 06/18/2020 46.7    • EF(cubed) 06/18/2020 64.8    • LV mass(C)d 06/18/2020 285.1    • LV mass(C)dI 06/18/2020 143.2    • SV(Teich) 06/18/2020 69.4    • SI(Teich) 06/18/2020 34.9    • SV(cubed) 06/18/2020 86.0    • SI(cubed) 06/18/2020 43.2    • Ao root diam 06/18/2020 2.9    • Ao root area 06/18/2020 6.6    • ACS 06/18/2020 1.8    • asc Aorta Diam 06/18/2020 2.8    • LVOT diam 06/18/2020 2.2    • LVOT area 06/18/2020 3.8    • LVOT area(traced) 06/18/2020 3.8    • RVOT diam 06/18/2020 2.1    • RVOT area 06/18/2020 3.5    • LVLd ap4 06/18/2020 8.3    • EDV(MOD-sp4) 06/18/2020 80.0    • LVLs ap4 06/18/2020 7.5    • ESV(MOD-sp4) 06/18/2020 29.0    • EF(MOD-sp4) 06/18/2020 63.8    • LVLd ap2 06/18/2020 8.2    • EDV(MOD-sp2) 06/18/2020 80.0    • LVLs ap2 06/18/2020 7.4    • ESV(MOD-sp2) 06/18/2020 32.0    • EF(MOD-sp2) 06/18/2020 60.0    • SV(MOD-sp4) 06/18/2020 51.0    • SI(MOD-sp4) 06/18/2020 25.6    • SV(MOD-sp2) 06/18/2020 48.0    • SI(MOD-sp2) 06/18/2020 24.1    • Ao root area (BSA correc* 06/18/2020 1.5    • LV Johnson Vol (BSA correct* 06/18/2020 40.2    • LV Sys Vol (BSA correcte* 06/18/2020 14.6    • EF(MOD-bp) 06/18/2020 61.8    • MV A dur 06/18/2020 0.16    • MV E max ellen 06/18/2020 69.8    •  MV A max jordan 06/18/2020 109.0    • MV E/A 06/18/2020 0.64    • MV V2 max 06/18/2020 133.0    • MV max PG 06/18/2020 7.1    • MV V2 mean 06/18/2020 67.8    • MV mean PG 06/18/2020 2.0    • MV V2 VTI 06/18/2020 40.5    • MVA(VTI) 06/18/2020 2.6    • MV P1/2t max jordan 06/18/2020 96.4    • MV P1/2t 06/18/2020 87.4    • MVA(P1/2t) 06/18/2020 2.5    • MV dec slope 06/18/2020 323.0    • MV dec time 06/18/2020 218    • Ao pk jordan 06/18/2020 155.0    • Ao max PG 06/18/2020 9.6    • Ao max PG (full) 06/18/2020 4.3    • Ao V2 mean 06/18/2020 96.4    • Ao mean PG 06/18/2020 5.0    • Ao mean PG (full) 06/18/2020 2.0    • Ao V2 VTI 06/18/2020 35.8    • RAMIRO(I,A) 06/18/2020 2.9    • RAMIRO(I,D) 06/18/2020 2.9    • RAMRIO(V,A) 06/18/2020 2.8    • RAMIRO(V,D) 06/18/2020 2.8    • LV V1 max PG 06/18/2020 5.3    • LV V1 mean PG 06/18/2020 3.0    • LV V1 max 06/18/2020 115.0    • LV V1 mean 06/18/2020 76.6    • LV V1 VTI 06/18/2020 27.2    • SV(Ao) 06/18/2020 236.5    • SI(Ao) 06/18/2020 118.8    • SV(LVOT) 06/18/2020 103.4    • SV(RVOT) 06/18/2020 58.9    • SI(LVOT) 06/18/2020 52.0    • PA V2 max 06/18/2020 95.0    • PA max PG 06/18/2020 3.6    • PA max PG (full) 06/18/2020 1.9    • BH CV ECHO DAYLIN - PVA(V,* 06/18/2020 2.4    • BH CV ECHO DAYLIN - PVA(V,* 06/18/2020 2.4    • PA acc time 06/18/2020 0.14    • RV V1 max PG 06/18/2020 1.7    • RV V1 mean PG 06/18/2020 1.0    • RV V1 max 06/18/2020 65.0    • RV V1 mean 06/18/2020 43.7    • RV V1 VTI 06/18/2020 17.0    • TR max jordan 06/18/2020 249.0    • PA pr(Accel) 06/18/2020 17.4    • Pulm Sys Jordan 06/18/2020 87.0    • Pulm Johnson Jordan 06/18/2020 52.4    • Pulm S/D 06/18/2020 1.7    • Qp/Qs 06/18/2020 0.57    • Pulm A Revs Dur 06/18/2020 0.17    • Pulm A Revs Jordan 06/18/2020 39.6    • MVA P1/2T LCG 06/18/2020 2.3    • RV BASE (<4.1) - obsolete 06/18/2020 3.5    • RV LENGTH (<8.5) - obsol* 06/18/2020 5.2    • RV Mid 06/18/2020 2.45    • Lat Peak E' Jordan 06/18/2020 8.9    • Med Peak E' Jordan 06/18/2020 7.3    •   CV ECHO DAYLIN - BZI_BMI 06/18/2020 37.9    •  CV ECHO DAYLIN - BSA(HA* 06/18/2020 2.1    •  CV ECHO DAYLIN - BZI_ME* 06/18/2020 97.1    •  CV ECHO DAYLIN - BZI_ME* 06/18/2020 160.0    • Avg E/e' ratio 06/18/2020 8.62    • RV S' 06/18/2020 13.30    • RV Base 06/18/2020 3.45    • RV Length 06/18/2020 5.20    • Sinus 06/18/2020 2.55    • STJ 06/18/2020 2.22    • Ascending aorta 06/18/2020 2.80    • Dimensionless Index 06/18/2020 0.8    • RAP systole 06/18/2020 8    • RVSP(TR) 06/18/2020 33    • TAPSE (>1.6) 06/18/2020 2.10    • Target HR (85%) 06/18/2020 128    • Max. Pred. HR (100%) 06/18/2020 150                 Assessment:          Diagnosis Plan   1. Bradycardia, sinus     2. Presence of cardiac pacemaker            Plan:      1. AV block - s/p pacemaker  2. Pacemaker followup - normal device function  3. PVC's - continue metoprolol     RTC 1 year      Level of Care:                 Ilir Farfan MD  10/04/21  .

## 2021-10-13 ENCOUNTER — HOSPITAL ENCOUNTER (OUTPATIENT)
Dept: CARDIOLOGY | Facility: HOSPITAL | Age: 71
Discharge: HOME | End: 2021-10-13
Admitting: INTERNAL MEDICINE

## 2021-10-13 VITALS
HEIGHT: 63 IN | SYSTOLIC BLOOD PRESSURE: 124 MMHG | DIASTOLIC BLOOD PRESSURE: 71 MMHG | BODY MASS INDEX: 37.56 KG/M2 | WEIGHT: 212 LBS

## 2021-10-13 DIAGNOSIS — I51.89 DIASTOLIC DYSFUNCTION: ICD-10-CM

## 2021-10-13 LAB
AORTIC DIMENSIONLESS INDEX: 0.7 (DI)
BH CV ECHO MEAS - AO MAX PG (FULL): 7.5 MMHG
BH CV ECHO MEAS - AO MAX PG: 10.9 MMHG
BH CV ECHO MEAS - AO MEAN PG (FULL): 3 MMHG
BH CV ECHO MEAS - AO MEAN PG: 5 MMHG
BH CV ECHO MEAS - AO ROOT AREA (BSA CORRECTED): 1.5
BH CV ECHO MEAS - AO ROOT AREA: 6.6 CM^2
BH CV ECHO MEAS - AO ROOT DIAM: 2.9 CM
BH CV ECHO MEAS - AO V2 MAX: 165 CM/SEC
BH CV ECHO MEAS - AO V2 MEAN: 108 CM/SEC
BH CV ECHO MEAS - AO V2 VTI: 36.6 CM
BH CV ECHO MEAS - ASC AORTA: 3.3 CM
BH CV ECHO MEAS - AVA(I,A): 2.1 CM^2
BH CV ECHO MEAS - AVA(I,D): 2.1 CM^2
BH CV ECHO MEAS - AVA(V,A): 1.7 CM^2
BH CV ECHO MEAS - AVA(V,D): 1.7 CM^2
BH CV ECHO MEAS - BSA(HAYCOCK): 2.1 M^2
BH CV ECHO MEAS - BSA: 2 M^2
BH CV ECHO MEAS - BZI_BMI: 37.6 KILOGRAMS/M^2
BH CV ECHO MEAS - BZI_METRIC_HEIGHT: 160 CM
BH CV ECHO MEAS - BZI_METRIC_WEIGHT: 96.2 KG
BH CV ECHO MEAS - EDV(CUBED): 175.6 ML
BH CV ECHO MEAS - EDV(MOD-SP2): 99 ML
BH CV ECHO MEAS - EDV(MOD-SP4): 101 ML
BH CV ECHO MEAS - EDV(TEICH): 153.7 ML
BH CV ECHO MEAS - EF(CUBED): 71.2 %
BH CV ECHO MEAS - EF(MOD-BP): 58.4 %
BH CV ECHO MEAS - EF(MOD-SP2): 61.6 %
BH CV ECHO MEAS - EF(MOD-SP4): 55.4 %
BH CV ECHO MEAS - EF(TEICH): 62.2 %
BH CV ECHO MEAS - ESV(CUBED): 50.7 ML
BH CV ECHO MEAS - ESV(MOD-SP2): 38 ML
BH CV ECHO MEAS - ESV(MOD-SP4): 45 ML
BH CV ECHO MEAS - ESV(TEICH): 58.1 ML
BH CV ECHO MEAS - FS: 33.9 %
BH CV ECHO MEAS - IVS/LVPW: 1
BH CV ECHO MEAS - IVSD: 0.9 CM
BH CV ECHO MEAS - LAT PEAK E' VEL: 8.2 CM/SEC
BH CV ECHO MEAS - LV DIASTOLIC VOL/BSA (35-75): 51 ML/M^2
BH CV ECHO MEAS - LV MASS(C)D: 191.6 GRAMS
BH CV ECHO MEAS - LV MASS(C)DI: 96.7 GRAMS/M^2
BH CV ECHO MEAS - LV MAX PG: 3.4 MMHG
BH CV ECHO MEAS - LV MEAN PG: 2 MMHG
BH CV ECHO MEAS - LV SYSTOLIC VOL/BSA (12-30): 22.7 ML/M^2
BH CV ECHO MEAS - LV V1 MAX: 91.7 CM/SEC
BH CV ECHO MEAS - LV V1 MEAN: 65.5 CM/SEC
BH CV ECHO MEAS - LV V1 VTI: 24.5 CM
BH CV ECHO MEAS - LVIDD: 5.6 CM
BH CV ECHO MEAS - LVIDS: 3.7 CM
BH CV ECHO MEAS - LVLD AP2: 8.3 CM
BH CV ECHO MEAS - LVLD AP4: 8.3 CM
BH CV ECHO MEAS - LVLS AP2: 6.7 CM
BH CV ECHO MEAS - LVLS AP4: 7 CM
BH CV ECHO MEAS - LVOT AREA (M): 3.1 CM^2
BH CV ECHO MEAS - LVOT AREA: 3.1 CM^2
BH CV ECHO MEAS - LVOT DIAM: 2 CM
BH CV ECHO MEAS - LVPWD: 0.9 CM
BH CV ECHO MEAS - MED PEAK E' VEL: 7.3 CM/SEC
BH CV ECHO MEAS - MR MAX PG: 98 MMHG
BH CV ECHO MEAS - MR MAX VEL: 495 CM/SEC
BH CV ECHO MEAS - MV A DUR: 0.12 SEC
BH CV ECHO MEAS - MV A MAX VEL: 99.8 CM/SEC
BH CV ECHO MEAS - MV DEC SLOPE: 219.5 CM/SEC^2
BH CV ECHO MEAS - MV DEC TIME: 280 SEC
BH CV ECHO MEAS - MV E MAX VEL: 50.1 CM/SEC
BH CV ECHO MEAS - MV E/A: 0.5
BH CV ECHO MEAS - MV MAX PG: 6 MMHG
BH CV ECHO MEAS - MV MEAN PG: 2 MMHG
BH CV ECHO MEAS - MV P1/2T MAX VEL: 72.1 CM/SEC
BH CV ECHO MEAS - MV P1/2T: 96.2 MSEC
BH CV ECHO MEAS - MV V2 MAX: 122 CM/SEC
BH CV ECHO MEAS - MV V2 MEAN: 61.7 CM/SEC
BH CV ECHO MEAS - MV V2 VTI: 29.9 CM
BH CV ECHO MEAS - MVA P1/2T LCG: 3.1 CM^2
BH CV ECHO MEAS - MVA(P1/2T): 2.3 CM^2
BH CV ECHO MEAS - MVA(VTI): 2.6 CM^2
BH CV ECHO MEAS - PA ACC TIME: 0.09 SEC
BH CV ECHO MEAS - PA MAX PG (FULL): 2.3 MMHG
BH CV ECHO MEAS - PA MAX PG: 3.5 MMHG
BH CV ECHO MEAS - PA PR(ACCEL): 40.8 MMHG
BH CV ECHO MEAS - PA V2 MAX: 94.1 CM/SEC
BH CV ECHO MEAS - RAP SYSTOLE: 3 MMHG
BH CV ECHO MEAS - RV MAX PG: 1.3 MMHG
BH CV ECHO MEAS - RV MEAN PG: 1 MMHG
BH CV ECHO MEAS - RV V1 MAX: 56.8 CM/SEC
BH CV ECHO MEAS - RV V1 MEAN: 44.9 CM/SEC
BH CV ECHO MEAS - RV V1 VTI: 14.6 CM
BH CV ECHO MEAS - RVSP: 16.5 MMHG
BH CV ECHO MEAS - SI(AO): 122 ML/M^2
BH CV ECHO MEAS - SI(CUBED): 63 ML/M^2
BH CV ECHO MEAS - SI(LVOT): 38.8 ML/M^2
BH CV ECHO MEAS - SI(MOD-SP2): 30.8 ML/M^2
BH CV ECHO MEAS - SI(MOD-SP4): 28.2 ML/M^2
BH CV ECHO MEAS - SI(TEICH): 48.2 ML/M^2
BH CV ECHO MEAS - SV(AO): 241.8 ML
BH CV ECHO MEAS - SV(CUBED): 125 ML
BH CV ECHO MEAS - SV(LVOT): 77 ML
BH CV ECHO MEAS - SV(MOD-SP2): 61 ML
BH CV ECHO MEAS - SV(MOD-SP4): 56 ML
BH CV ECHO MEAS - SV(TEICH): 95.5 ML
BH CV ECHO MEAS - TAPSE (>1.6): 2.5 CM
BH CV ECHO MEAS - TR MAX VEL: 184 CM/SEC
BH CV ECHO MEASUREMENTS AVERAGE E/E' RATIO: 6.46
BH CV XLRA - RV BASE: 3.4 CM
BH CV XLRA - RV LENGTH: 7.5 CM
BH CV XLRA - RV MID: 2.4 CM
BH CV XLRA - TDI S': 11.7 CM/SEC
LEFT ATRIUM VOLUME INDEX: 16 ML/M2
MAXIMAL PREDICTED HEART RATE: 148 BPM
STRESS TARGET HR: 126 BPM

## 2021-10-13 PROCEDURE — 93306 TTE W/DOPPLER COMPLETE: CPT | Performed by: INTERNAL MEDICINE

## 2021-10-13 PROCEDURE — 93306 TTE W/DOPPLER COMPLETE: CPT

## 2021-10-26 ENCOUNTER — OFFICE VISIT (OUTPATIENT)
Dept: ORTHOPEDIC SURGERY | Facility: CLINIC | Age: 71
End: 2021-10-26

## 2021-10-26 VITALS — WEIGHT: 213.63 LBS | TEMPERATURE: 96.4 F | BODY MASS INDEX: 37.85 KG/M2 | HEIGHT: 63 IN

## 2021-10-26 DIAGNOSIS — Z96.651 HX OF TOTAL KNEE ARTHROPLASTY, RIGHT: Primary | ICD-10-CM

## 2021-10-26 PROCEDURE — 99212 OFFICE O/P EST SF 10 MIN: CPT | Performed by: ORTHOPAEDIC SURGERY

## 2021-10-26 PROCEDURE — 73562 X-RAY EXAM OF KNEE 3: CPT | Performed by: ORTHOPAEDIC SURGERY

## 2021-10-26 RX ORDER — MECLIZINE HCL 12.5 MG/1
TABLET ORAL
COMMUNITY
Start: 2021-10-12 | End: 2021-12-15

## 2021-10-26 NOTE — PROGRESS NOTES
"Chanell Tafoya : 1950 MRN: [id number] DATE: 10/26/2021    DIAGNOSIS: Annual follow up right total knee      SUBJECTIVE:Patient returns today for a one year follow up of right total knee replacement. Patient reports doing well with no unusual complaints. Denies any limitations due to the knee.  She does report she has been having some balance and vertigo issues which her family doctor is handling at this moment.    OBJECTIVE:    Temp 96.4 °F (35.8 °C)   Ht 160 cm (63\")   Wt 96.9 kg (213 lb 10.1 oz)   BMI 37.84 kg/m²   Family History   Problem Relation Age of Onset   • Heart disease Mother    • Hypertension Mother    • Heart failure Mother    • Diabetes Mother    • Aortic aneurysm Father    • Cancer Father         skin, prostate, bladder   • Diabetes Sister    • Malig Hyperthermia Neg Hx      Past Medical History:   Diagnosis Date   • Bradycardia     s/p MDT dual chamber PPM 2018   • GERD (gastroesophageal reflux disease)    • Heart murmur    • History of cellulitis    • History of kidney stones    • History of staph infection     IN FOOT AFTER SURGERY   • Hyperlipidemia    • Hypertension    • Nonocclusive coronary atherosclerosis of native coronary artery     2015: Catheter: Left main normal. Proximal LAD with luminal irregularities; circumflex and right coronary artery normal. EF 55%   • Osteoarthritis    • Sarcoidosis    • Sleep apnea     cpap     Past Surgical History:   Procedure Laterality Date   • APPENDECTOMY     • CARDIAC CATHETERIZATION      2015: Catheter: Left main normal. Proximal LAD with luminal irregularities; circumflex and right coronary artery normal. EF 55%   • CHOLECYSTECTOMY     • COLONOSCOPY     • ENDOSCOPY     • ESOPHAGEAL DILATATION     • FOOT SURGERY Right     3 screws   • HAND SURGERY Left     thumb reconstruction    • HAND SURGERY Right     thumb reconstruction   • HYSTERECTOMY     • INSERT / REPLACE / REMOVE PACEMAKER     • KIDNEY STONE " SURGERY  2006   • KNEE SURGERY Right 2006    torn meniscus    • LUNG SURGERY Right 2001    lymph node biopsied (sarcoidosis)   • PACEMAKER IMPLANTATION  04/23/2018    [employer]   • TONSILLECTOMY AND ADENOIDECTOMY  1955   • TOTAL KNEE ARTHROPLASTY Right 10/1/2018    Procedure: TOTAL KNEE ARTHROPLASTY;  Surgeon: Mathew Sloan MD;  Location: [facility];  Service: Orthopedics     Social History     Socioeconomic History   • Marital status:    Tobacco Use   • Smoking status: Never Smoker   • Smokeless tobacco: Never Used   Substance and Sexual Activity   • Alcohol use: No   • Drug use: No   • Sexual activity: Defer     Review of Systems: 14 point review of systems performed, all systems negative     Exam: The incision is well healed. Range of motion is measured at 0 to 125. The calf is soft and nontender with a negative Homans sign. Alignment is neutral. Good quad strength. There is no evidence of varus/valgus or flexion instability. No effusion. Intact to light touch with palpable distal pulses.     DIAGNOSTIC STUDIES  Xrays: 3 views(AP bilateral knees, lateral right, and sunrise bilateral knees) were ordered and reviewed for evaluation of right knee replacement. They demonstrate a well positioned, well aligned knee replacement without complicating factors noted. In comparison with previous films there has been no change.    ASSESSMENT: Annual follow up right knee replacement.    PLAN:  Continue activities as tolerated    Follow up in one your for annual visit    LIA Ruff  10/26/2021     This patient was seen in conjunction today with Dr. Mathew Sloan.  Dr. Sloan agrees with the above-stated assessment and plan.

## 2021-11-16 DIAGNOSIS — I49.3 PVC'S (PREMATURE VENTRICULAR CONTRACTIONS): Primary | ICD-10-CM

## 2021-11-16 RX ORDER — METOPROLOL SUCCINATE 25 MG/1
25 TABLET, EXTENDED RELEASE ORAL DAILY
Qty: 90 TABLET | Refills: 1 | Status: SHIPPED | OUTPATIENT
Start: 2021-11-16 | End: 2022-05-18

## 2021-12-14 NOTE — PROGRESS NOTES
"Chief Complaint  Hypertension    Subjective    History of Present Illness      I saw Ms. Tafoya in the office today. She is a  pleasant 71-year-old female with a history of essential hypertension,  permanent pacemaker secondary to symptomatic bradycardia and dyslipidemia.  She has sleep apnea and uses CPAP.  She has GERD.  She has a right bundle branch block.  She has nonobstructive coronary disease.  She has a history of sarcoidosis which was diagnosed in 2001.  She is followed by Dr. Mas    Ms. Tafoya is doing fairly well from a cardiac standpoint.  She does have some atypical chest discomfort with very short, sharp shooting pains which occur unpredictably.  She still has occasional PVCs.  No new shortness of breath.  No orthopnea or paroxysmal nocturnal dyspnea.  Occasional mild ankle edema.  Her major complaint since her previous visit is that of dizziness.  She feels like her dizziness has gotten much worse.  She has been evaluated by ENT and apparently still has 1 test to go and she is going to get that scheduled.  To this point, her studies have been normal and she states that ENT has suggested that she may need to see neurology.  She states that she has fallen on occasion due to her dizziness although not for quite some time.  She states that she has developed ocular migraines.    Objective   Vital Signs:   /60   Pulse 70   Ht 160 cm (63\")   Wt 96.6 kg (213 lb)   BMI 37.73 kg/m²     Vitals and nursing note reviewed.   Constitutional:       Appearance: Healthy appearance. Not in distress.   Eyes:      Pupils: Pupils are equal, round, and reactive to light.   Neck:      Vascular: No JVR. JVD normal.   Pulmonary:      Effort: Pulmonary effort is normal.      Breath sounds: Normal breath sounds. No wheezing. No rhonchi. No rales.   Chest:      Chest wall: Not tender to palpatation.      Comments: Healed pacemaker scar  Cardiovascular:      PMI at left midclavicular line. Normal rate. Occasional " ectopic beats. Regular rhythm. Normal S1. Normal S2.      Murmurs: There is a systolic murmur.  There is a 1/6 systolic murmur at the left lower sternal border.      No gallop. No click. No rub.   Pulses:     Intact distal pulses.   Edema:     Peripheral edema ( Mild bilateral ankle edema) present.  Abdominal:      General: Bowel sounds are normal.      Palpations: Abdomen is soft. There is no hepatomegaly.      Tenderness: There is no abdominal tenderness.   Musculoskeletal: Normal range of motion.         General: No tenderness.      Cervical back: Normal range of motion and neck supple. Skin:     General: Skin is warm and dry.   Neurological:      General: No focal deficit present.      Mental Status: Alert and oriented to person, place and time.      Gait: Gait is intact.         Result Review :   The following data was reviewed by: Malu Patten MD on 12/15/2021:    She had a BMP in September 2021 which demonstrated glucose 81, BUN 21, and 0.89 and potassium 4.2    Lipid profile from January 2021 demonstrated total cholesterol 219, , HDL 62, triglycerides 95                    Assessment and Plan    1. Essential hypertension  Controlled.  She does monitor her blood pressure at home.  Her losartan was discontinued secondary to periods of decreased blood pressure.    2. Dyslipidemia  Her most recent lipid profile in January 2021 demonstrated LDL of 140.  She does not want to take statin therapy.    3. Presence of cardiac pacemaker  Interrogation of her pacemaker demonstrated normal function.  She is 5.5 years remaining on her battery.  She is pacing the ventricle 3.6% of the time and atrial pacing of 93.2% of the time    4. PVC's (premature ventricular contractions)  She does have occasional PVCs on exam.  She is on metoprolol 25 mg daily.        Follow Up   No follow-ups on file.  Patient was given instructions and counseling regarding her condition or for health maintenance advice. Please see specific  information pulled into the AVS if appropriate.

## 2021-12-15 ENCOUNTER — OFFICE VISIT (OUTPATIENT)
Dept: CARDIOLOGY | Facility: CLINIC | Age: 71
End: 2021-12-15

## 2021-12-15 VITALS
SYSTOLIC BLOOD PRESSURE: 114 MMHG | WEIGHT: 213 LBS | HEIGHT: 63 IN | HEART RATE: 70 BPM | DIASTOLIC BLOOD PRESSURE: 60 MMHG | BODY MASS INDEX: 37.74 KG/M2

## 2021-12-15 DIAGNOSIS — I10 ESSENTIAL HYPERTENSION: ICD-10-CM

## 2021-12-15 DIAGNOSIS — Z95.0 PRESENCE OF CARDIAC PACEMAKER: Chronic | ICD-10-CM

## 2021-12-15 DIAGNOSIS — I49.3 PVC'S (PREMATURE VENTRICULAR CONTRACTIONS): Chronic | ICD-10-CM

## 2021-12-15 DIAGNOSIS — E78.5 DYSLIPIDEMIA: Chronic | ICD-10-CM

## 2021-12-15 DIAGNOSIS — I10 ESSENTIAL HYPERTENSION: Primary | Chronic | ICD-10-CM

## 2021-12-15 PROBLEM — Z86.2 HISTORY OF SARCOIDOSIS: Status: ACTIVE | Noted: 2021-12-15

## 2021-12-15 PROCEDURE — 99214 OFFICE O/P EST MOD 30 MIN: CPT | Performed by: INTERNAL MEDICINE

## 2021-12-15 PROCEDURE — 93280 PM DEVICE PROGR EVAL DUAL: CPT | Performed by: INTERNAL MEDICINE

## 2021-12-16 RX ORDER — HYDROCHLOROTHIAZIDE 25 MG/1
TABLET ORAL
Qty: 90 TABLET | Refills: 1 | Status: SHIPPED | OUTPATIENT
Start: 2021-12-16 | End: 2022-05-18

## 2022-03-14 ENCOUNTER — TELEPHONE (OUTPATIENT)
Dept: CARDIOLOGY | Facility: CLINIC | Age: 72
End: 2022-03-14

## 2022-03-14 NOTE — TELEPHONE ENCOUNTER
PT is having an MRI 3/15/22. She has questions how that effects her pacemaker, does it get turned off?

## 2022-05-18 DIAGNOSIS — I49.3 PVC'S (PREMATURE VENTRICULAR CONTRACTIONS): ICD-10-CM

## 2022-05-18 DIAGNOSIS — I10 ESSENTIAL HYPERTENSION: ICD-10-CM

## 2022-05-18 RX ORDER — METOPROLOL SUCCINATE 25 MG/1
TABLET, EXTENDED RELEASE ORAL
Qty: 90 TABLET | Refills: 1 | Status: SHIPPED | OUTPATIENT
Start: 2022-05-18

## 2022-05-18 RX ORDER — HYDROCHLOROTHIAZIDE 25 MG/1
TABLET ORAL
Qty: 90 TABLET | Refills: 1 | Status: SHIPPED | OUTPATIENT
Start: 2022-05-18

## 2022-05-25 PROCEDURE — 93294 REM INTERROG EVL PM/LDLS PM: CPT | Performed by: INTERNAL MEDICINE

## 2022-05-25 PROCEDURE — 93296 REM INTERROG EVL PM/IDS: CPT | Performed by: INTERNAL MEDICINE

## 2022-06-21 ENCOUNTER — OFFICE VISIT (OUTPATIENT)
Dept: CARDIOLOGY | Facility: CLINIC | Age: 72
End: 2022-06-21

## 2022-06-21 VITALS
WEIGHT: 213 LBS | BODY MASS INDEX: 37.74 KG/M2 | HEIGHT: 63 IN | SYSTOLIC BLOOD PRESSURE: 124 MMHG | DIASTOLIC BLOOD PRESSURE: 88 MMHG | HEART RATE: 72 BPM

## 2022-06-21 DIAGNOSIS — I45.10 RBBB: Chronic | ICD-10-CM

## 2022-06-21 DIAGNOSIS — Z95.0 PRESENCE OF CARDIAC PACEMAKER: Chronic | ICD-10-CM

## 2022-06-21 DIAGNOSIS — R42 DIZZINESS: Chronic | ICD-10-CM

## 2022-06-21 DIAGNOSIS — I10 ESSENTIAL HYPERTENSION: Primary | Chronic | ICD-10-CM

## 2022-06-21 PROCEDURE — 93000 ELECTROCARDIOGRAM COMPLETE: CPT | Performed by: INTERNAL MEDICINE

## 2022-06-21 PROCEDURE — 99214 OFFICE O/P EST MOD 30 MIN: CPT | Performed by: INTERNAL MEDICINE

## 2022-06-21 RX ORDER — ACETAMINOPHEN 500 MG
500 TABLET ORAL EVERY 6 HOURS PRN
COMMUNITY

## 2022-06-21 NOTE — PROGRESS NOTES
"Chief Complaint  No chief complaint on file.    Subjective    History of Present Illness      I saw Ms. Tafoya in the office today. She is a  pleasant 71-year-old female with a history of essential hypertension,  permanent pacemaker secondary to symptomatic bradycardia and dyslipidemia.  She has sleep apnea and uses CPAP.  She has GERD.  She has a right bundle branch block.  She has nonobstructive coronary disease.  She has a history of sarcoidosis which was diagnosed in 2001.  She is followed by Dr. Mas    Ms. Tafoya is doing well from a cardiovascular standpoint.  She had an episode of chest discomfort approximately 4 months ago.  It was of short duration.  Symptoms sound atypical for ischemia.  No shortness of air.  She has mild ankle edema.  She has some mild varicosities, particularly on her right lower extremity.  No orthopnea or paroxysmal nocturnal dyspnea.  She does have PVCs but that does not note any significant palpitations.  No atrial fibrillation.  Her pacemaker is functioning appropriately from interrogation on 5/28/2022.    Ms. Tafoya states that she still has some dizziness and vertigo.  She did have a fall recently but did not injure herself.  This occurred when she got out of bed abruptly. In April 2020, she had MRA brain without contrast which demonstrated no evidence of moderate or high-grade stenosis, occlusion or aneurysm within the intracranial arterial vasculature.  She had an MRI of the neck with and without contrast.  The MRA of the neck.  MRI demonstrated abnormal appearance of her brain for age.  No explanation for the patient's dizziness    Objective   Vital Signs:   /88   Pulse 72   Ht 160 cm (63\")   Wt 96.6 kg (213 lb)   BMI 37.73 kg/m²     Vitals and nursing note reviewed.   Constitutional:       Appearance: Healthy appearance. Not in distress.   Eyes:      Pupils: Pupils are equal, round, and reactive to light.   Neck:      Vascular: No JVR. JVD normal.   Pulmonary:     "  Effort: Pulmonary effort is normal.      Breath sounds: Normal breath sounds. No wheezing. No rhonchi. No rales.   Chest:      Chest wall: Not tender to palpatation.      Comments: Healed pacemaker scar  Cardiovascular:      PMI at left midclavicular line. Normal rate. Occasional ectopic beats. Regular rhythm. Normal S1. Normal S2.      Murmurs: There is a systolic murmur.  There is a 1/6 systolic murmur at the left lower sternal border.      No gallop. No click. No rub.   Pulses:     Intact distal pulses.   Edema:     Peripheral edema ( Mild bilateral ankle edema) present.  Abdominal:      General: Bowel sounds are normal.      Palpations: Abdomen is soft. There is no hepatomegaly.      Tenderness: There is no abdominal tenderness.   Musculoskeletal: Normal range of motion.         General: No tenderness.      Cervical back: Normal range of motion and neck supple. Skin:     General: Skin is warm and dry.   Neurological:      General: No focal deficit present.      Mental Status: Alert and oriented to person, place and time.      Gait: Gait is intact.         Result Review :        Component 09/09/21 07/20/21 05/28/20 12/09/19 12/03/18 07/31/18   Glucose 81  86  84  94  100 High   95    BUN 21 21 19 18 14 18   Creatinine 0.89  0.76  0.76  0.69  0.77  0.66    EGFR NON-AFR. AMERICAN 65 79 79 88 79 91   EGFR,  75 91 92 102 91 105   UREA NITROGEN/CREATININE (MASS RATIO) IN SER/PLAS NOT APPLICABLE NOT APPLICABLE -- -- -- --   Sodium 140 139 140 142 140 139   Potassium 4.2 4.3 3.8 3.7 3.8 4.0   Chloride 100 102 102 104 103 103   CO2 30 27 28 30 24 28   Calcium 10.1 10.1 9.6 9.5 9.9 9.6               ECG 12 Lead    Date/Time: 6/21/2022 9:03 AM  Performed by: Malu Patten MD  Authorized by: Malu Patten MD   Comparison: compared with previous ECG from 10/2/2018  Comparison to previous ECG: AV paced  Comments: Atrial pacing with right bundle branch block and left anterior fascicular  block.              Assessment and Plan    1. Essential hypertension  Her blood pressure is actually fairly well controlled.  She is on hydrochlorothiazide.  She is on metoprolol for her palpitations    2. Presence of cardiac pacemaker  Was last remotely interrogated 5/28/2022 with normal function.  She is pacing the atrium 94.2% of the time and pacing the ventricle 3.34% of the time.  No atrial fibrillation    3. RBBB  Stable    4. Dizziness  She does have some dizziness which sounds orthostatic but she also has vertigo.  Recent MRI and MRA of the brain was unrevealing.    5.  Palpitations  Controlled with metoprolol extended release 25 mg daily        Follow Up   No follow-ups on file.  Patient was given instructions and counseling regarding her condition or for health maintenance advice. Please see specific information pulled into the AVS if appropriate.

## 2022-07-20 ENCOUNTER — TELEPHONE (OUTPATIENT)
Dept: CARDIOLOGY | Facility: CLINIC | Age: 72
End: 2022-07-20

## 2022-07-20 NOTE — TELEPHONE ENCOUNTER
CPA pt    Patient would like Dr. Patten to know reason why she thinks her sodium level was high on lab work dated 07/14/2022. States she was adding baking soda to her laundry and thinks her body absorbed baking soda from her clothes.

## 2022-08-24 PROCEDURE — 93294 REM INTERROG EVL PM/LDLS PM: CPT | Performed by: INTERNAL MEDICINE

## 2022-08-24 PROCEDURE — 93296 REM INTERROG EVL PM/IDS: CPT | Performed by: INTERNAL MEDICINE

## 2022-09-20 ENCOUNTER — TELEPHONE (OUTPATIENT)
Dept: CARDIOLOGY | Facility: CLINIC | Age: 72
End: 2022-09-20

## 2022-09-20 NOTE — TELEPHONE ENCOUNTER
Caller: Chanell Tafoya    Relationship: Self    Best call back number: [phone]     What is the best time to reach you: ANYTIME    What was the call regarding: PT REQUESTING TO SPEAK WITH DR KELLEY NURSE - PT REPORTS SHE SEES DR GOLDBERG AND DR PELLETIER AND WANTED TO KNOW IF DR PELLETIER WILL BE MOVING PRACTICES     Do you require a callback: YES PLEASE

## 2022-10-17 ENCOUNTER — TELEPHONE (OUTPATIENT)
Dept: CARDIOLOGY | Facility: CLINIC | Age: 72
End: 2022-10-17

## 2022-10-17 NOTE — TELEPHONE ENCOUNTER
PT CALLED IN TO GET HER MEDICAL INFORMATION RELEASED TO [facility] CARDIOLOGYTO HER NEW DOCTOR, DR JUAN PABLO FORTUNE[facility] PHONE NUMBER: [phone] - CALL BACK IF NEEDED

## 2022-10-28 ENCOUNTER — OFFICE VISIT (OUTPATIENT)
Dept: ORTHOPEDIC SURGERY | Facility: CLINIC | Age: 72
End: 2022-10-28

## 2022-10-28 VITALS — BODY MASS INDEX: 37.25 KG/M2 | WEIGHT: 210.2 LBS | HEIGHT: 63 IN | TEMPERATURE: 98.4 F

## 2022-10-28 DIAGNOSIS — M25.562 ACUTE PAIN OF LEFT KNEE: ICD-10-CM

## 2022-10-28 DIAGNOSIS — Z96.651 HX OF TOTAL KNEE ARTHROPLASTY, RIGHT: ICD-10-CM

## 2022-10-28 DIAGNOSIS — R52 PAIN: Primary | ICD-10-CM

## 2022-10-28 PROCEDURE — 99213 OFFICE O/P EST LOW 20 MIN: CPT | Performed by: NURSE PRACTITIONER

## 2022-10-28 PROCEDURE — 73562 X-RAY EXAM OF KNEE 3: CPT | Performed by: NURSE PRACTITIONER

## 2022-10-28 RX ORDER — MULTIPLE VITAMINS W/ MINERALS TAB 9MG-400MCG
1 TAB ORAL DAILY
COMMUNITY

## 2022-10-28 NOTE — PROGRESS NOTES
"Chanell Tafoya : 1950 MRN: [id number] DATE: 10/28/2022    DIAGNOSIS: Annual follow up right total knee      SUBJECTIVE:Patient returns today for an annual follow up of right total knee replacement that was done 4 years ago. Patient reports doing well with no unusual complaints. Denies any limitations due to the knee. She did state that she fell last night and landed on both of her knee's. Denies any instability afterwards or difficulty weight bearing. She reports her left knee is worse than right as far as pain is concerned. She reports some mild swelling noted around the left knee cap area.     OBJECTIVE:    Temp 98.4 °F (36.9 °C) (Temporal)   Ht 160 cm (63\")   Wt 95.3 kg (210 lb 3.2 oz)   BMI 37.24 kg/m²   Family History   Problem Relation Age of Onset   • Heart disease Mother    • Hypertension Mother    • Heart failure Mother    • Diabetes Mother    • Aortic aneurysm Father    • Cancer Father         skin, prostate, bladder   • Diabetes Sister    • Malig Hyperthermia Neg Hx      Past Medical History:   Diagnosis Date   • Bradycardia     s/p MDT dual chamber PPM 2018   • GERD (gastroesophageal reflux disease)    • Heart murmur    • History of cellulitis    • History of kidney stones    • History of staph infection     IN FOOT AFTER SURGERY   • Hyperlipidemia    • Hypertension    • Nonocclusive coronary atherosclerosis of native coronary artery     2015: Catheter: Left main normal. Proximal LAD with luminal irregularities; circumflex and right coronary artery normal. EF 55%   • Osteoarthritis    • Sarcoidosis    • Sleep apnea     cpap     Past Surgical History:   Procedure Laterality Date   • APPENDECTOMY     • CARDIAC CATHETERIZATION      2015: Catheter: Left main normal. Proximal LAD with luminal irregularities; circumflex and right coronary artery normal. EF 55%   • CATARACT EXTRACTION Bilateral 2022   • CHOLECYSTECTOMY     • COLONOSCOPY     • ENDOSCOPY     • ESOPHAGEAL DILATATION  " 2015   • FOOT SURGERY Right 2012    3 screws   • HAND SURGERY Left 1993    thumb reconstruction    • HAND SURGERY Right 2007    thumb reconstruction   • HYSTERECTOMY  1981   • INSERT / REPLACE / REMOVE PACEMAKER     • KIDNEY STONE SURGERY  2006   • KNEE SURGERY Right 2006    torn meniscus    • LUNG SURGERY Right 2001    lymph node biopsied (sarcoidosis)   • PACEMAKER IMPLANTATION  04/23/2018    [employer]   • TONSILLECTOMY AND ADENOIDECTOMY  1955   • TOTAL KNEE ARTHROPLASTY Right 10/01/2018    Procedure: TOTAL KNEE ARTHROPLASTY;  Surgeon: Mathew Sloan MD;  Location: [facility];  Service: Orthopedics     Social History     Socioeconomic History   • Marital status:    Tobacco Use   • Smoking status: Never   • Smokeless tobacco: Never   Substance and Sexual Activity   • Alcohol use: No   • Drug use: No   • Sexual activity: Defer     Review of Systems: 14 point review of systems performed, all systems negative     Exam: Right knee -  The incision is well healed. Range of motion is measured at 0 to 120. The calf is soft and nontender with a negative Homans sign. Alignment is neutral. Good quad strength. There is no evidence of varus/valgus or flexion instability. No effusion. Intact to light touch with palpable distal pulses.     Left knee - swelling noted globally around the knee. Tender to palpation around the lower patella region. Limited ROM due to pain, extensor mechanism is intact.     DIAGNOSTIC STUDIES  Xrays: 3 views(AP bilateral knees, lateral right, and sunrise bilateral knees) were ordered and reviewed for evaluation of right knee replacement. They demonstrate a well positioned, well aligned knee replacement without complicating factors noted. In comparison with previous films there has been no change.     ASSESSMENT: Annual follow up right knee replacement with bilateral knee pain following a fall    PLAN:      Treatment options as well as imaging results were discussed in detail with the  patient.  Patient appears to be doing well after surgery and has not had any complications from her right knee.  In regards to the patient's left knee there is no obvious sign of a fracture on x-rays just some bruising and swelling noted about the knee.  I instructed her to ice, elevate compress, take Tylenol for pain.  Going forward we do not see need to see this patient every year and she can follow-up with us on an as-needed basis.    Robert Templeton, APRN  10/28/2022

## 2022-11-14 ENCOUNTER — TELEPHONE (OUTPATIENT)
Dept: CARDIOLOGY | Facility: CLINIC | Age: 72
End: 2022-11-14

## 2022-11-14 NOTE — TELEPHONE ENCOUNTER
Caller: Chanell Tafoya    Relationship to patient: Self    Best call back number: [phone]    Patient is needing: PT IS REQUESTING ALL HER MEDICAL RECORDS FROM DR. GOLDBERG AND DR. PELLETIER BE SENT OVER TO DR. FORTUNE'S OFFICE. FAX NUMBER [phone].  HUB IS ALSO GIVING PT MEDICAL RECORDS NUMBER FOR DR. FORTUNE TO CALL.

## 2023-02-22 PROCEDURE — 93294 REM INTERROG EVL PM/LDLS PM: CPT | Performed by: INTERNAL MEDICINE

## 2023-02-22 PROCEDURE — 93296 REM INTERROG EVL PM/IDS: CPT | Performed by: INTERNAL MEDICINE

## 2023-03-15 NOTE — OUTREACH NOTE
Total Joint Week 1 Survey      Responses   Facility patient discharged from?  [facility]   Does the patient have one of the following disease processes/diagnoses(primary or secondary)?  Total Joint Replacement   Is there a successful TCM telephone encounter documented?  No   Joint surgery performed?  Knee   Week 1 attempt successful?  Yes   Call start time  1246   Call end time  1307   Has the patient been back in either the hospital or Emergency Department since discharge?  No   Discharge diagnosis  Primary osteoarthritis of right knee TOTAL KNEE ARTHROPLASTY   Is patient permission given to speak with other caregiver?  No   Does the patient have all medications related to this admission filled (includes all antibiotics, pain medications, etc.)  Yes   Is the patient taking all medications as directed (includes completed medication regime)?  Yes   Is the patient able to teach back alternate methods of pain control?  Ice, Knee-elevation/no pillow under knee, Reposition, Correct alignment, Short, frequent activity   Does the patient have a follow up appointment with their surgeon?  Yes   Has the patient kept scheduled appointments due by today?  N/A   What is the Home health agency?   [facility]   Has home health visited the patient within 72 hours of discharge?  Yes   DME comments  walker and cane   Psychosocial issues?  No   Has the patient began therapy sessions (either in the home or as an out patient)?  Yes   Does the patient have a wound vac in place?  N/A   Has the patient fallen since discharge?  No   Did the patient receive a copy of their discharge instructions?  Yes   Nursing interventions  Reviewed instructions with patient, Educated on MyChart   What is the patient's perception of their functional status since discharge?  Improving   Is the patient able to teach back signs and symptoms of infection?  Temp >100.4 for 24h or longer, Incisional drainage, Blisters around incision, Increased swelling  or redness around incision (not associated with surgical edema), Severe discomfort or pain, Changes in mobility, Shortness of breath or chest pain   Is the patient able to teach back how to prevent infection?  Check incision daily, Wash hands before and after touching incision, Keep incision covered if drainage, No lotion or creams, No tub baths, hot tub or swimming, Eat well-balanced diet   Is the patient able to teach back signs and symptoms of DVT?  Redness in calf, Area hot to touch, Shortness of breath or chest pain, Swelling in calf, Severe pain in calf   Is the patient able to teach back home safety measures?  Ability to shower, Accessibility to necessary areas in home, Modifications to reach items, Modifications with ADLs such as dressing, cooking, toileting   Did the patient implement home safety suggestions from pre-surgery classes if attended?  Yes   Is the patient/caregiver able to teach back the hierarchy of who to call/visit for symptoms/problems? PCP, Specialist, Home health nurse, Urgent Care, ED, 911  Yes   Week 1 call completed?  Yes          Fouzia Brown RN   Ketoconazole Pregnancy And Lactation Text: This medication is Pregnancy Category C and it isn't know if it is safe during pregnancy. It is also excreted in breast milk and breast feeding isn't recommended.

## 2024-06-06 NOTE — TELEPHONE ENCOUNTER
Called patient and asked if she could have them fax these results to us, as we do not have access to [facility]. Also let her know that Dr. Patten is out until Feb 1st, will show her these results when she gets back.    Where Is Your Acne Located?: Face Your Weight In Pounds:: 130 Females Only: What Is Your Primary Form Of Birth Control?: Abstinence Females Only: What Is Your Secondary Form Of Birth Control?: Male condoms

## (undated) DEVICE — UNDERCAST PADDING: Brand: DEROYAL

## (undated) DEVICE — SUT VIC 0 CT1 36IN J946H

## (undated) DEVICE — GLV SURG SENSICARE PI PF LF 7 GRN STRL

## (undated) DEVICE — MEDI-VAC YANKAUER SUCTION HANDLE W/BULBOUS TIP: Brand: CARDINAL HEALTH

## (undated) DEVICE — APPL DURAPREP IODOPHOR APL 26ML

## (undated) DEVICE — 3M™ IOBAN™ 2 ANTIMICROBIAL INCISE DRAPE 6640EZ: Brand: IOBAN™ 2

## (undated) DEVICE — MAT FLR ABSORBENT LG 4FT 10 2.5FT

## (undated) DEVICE — GLV SURG SENSICARE W/ALOE PF LF 7.5 STRL

## (undated) DEVICE — ENCORE® LATEX ORTHO SIZE 7.5, STERILE LATEX POWDER-FREE SURGICAL GLOVE: Brand: ENCORE

## (undated) DEVICE — STPLR SKIN VISISTAT WD 35CT

## (undated) DEVICE — PK KN TOTL 40

## (undated) DEVICE — DUAL CUT SAGITTAL BLADE

## (undated) DEVICE — GLV SURG SENSICARE MICRO PF LF 7 STRL

## (undated) DEVICE — PREP SOL POVIDONE/IODINE BT 4OZ

## (undated) DEVICE — SUT VIC 1 CT1 36IN J947H

## (undated) DEVICE — BNDG ELAS ELITE V/CLOSE 6IN 5YD LF STRL

## (undated) DEVICE — PREMIUM WET SKIN PREP TRAY: Brand: MEDLINE INDUSTRIES, INC.

## (undated) DEVICE — NDL SPINE 22G 31/2IN BLK

## (undated) DEVICE — SOL NACL 0.9PCT 100ML SGL

## (undated) DEVICE — GLV SURG SENSICARE W/ALOE PF LF 8 STRL

## (undated) DEVICE — DRSNG WND GZ CURAD OIL EMULSION 3X8IN LF STRL 1PK